# Patient Record
Sex: FEMALE | Race: WHITE | Employment: FULL TIME | URBAN - METROPOLITAN AREA
[De-identification: names, ages, dates, MRNs, and addresses within clinical notes are randomized per-mention and may not be internally consistent; named-entity substitution may affect disease eponyms.]

---

## 2017-02-13 ENCOUNTER — TELEPHONE (OUTPATIENT)
Dept: INTERNAL MEDICINE CLINIC | Facility: CLINIC | Age: 47
End: 2017-02-13

## 2017-02-13 ENCOUNTER — OFFICE VISIT (OUTPATIENT)
Dept: INTERNAL MEDICINE CLINIC | Facility: CLINIC | Age: 47
End: 2017-02-13

## 2017-02-13 VITALS
TEMPERATURE: 99 F | DIASTOLIC BLOOD PRESSURE: 76 MMHG | HEART RATE: 60 BPM | OXYGEN SATURATION: 97 % | WEIGHT: 246.81 LBS | BODY MASS INDEX: 39.66 KG/M2 | SYSTOLIC BLOOD PRESSURE: 136 MMHG | HEIGHT: 66 IN

## 2017-02-13 DIAGNOSIS — E03.9 ACQUIRED HYPOTHYROIDISM: ICD-10-CM

## 2017-02-13 DIAGNOSIS — I10 ESSENTIAL HYPERTENSION WITH GOAL BLOOD PRESSURE LESS THAN 130/80: Primary | ICD-10-CM

## 2017-02-13 DIAGNOSIS — E11.9 CONTROLLED TYPE 2 DIABETES MELLITUS WITHOUT COMPLICATION, WITHOUT LONG-TERM CURRENT USE OF INSULIN (HCC): ICD-10-CM

## 2017-02-13 LAB
CREAT UR-MCNC: 89.1 MG/DL
MICROALBUMIN UR-MCNC: 0.9 MG/DL (ref 0–1.8)
MICROALBUMIN/CREAT UR: 10.1 MG/G{CREAT} (ref 0–20)

## 2017-02-13 PROCEDURE — 99214 OFFICE O/P EST MOD 30 MIN: CPT | Performed by: INTERNAL MEDICINE

## 2017-02-13 PROCEDURE — 99212 OFFICE O/P EST SF 10 MIN: CPT | Performed by: INTERNAL MEDICINE

## 2017-02-13 PROCEDURE — 36415 COLL VENOUS BLD VENIPUNCTURE: CPT | Performed by: INTERNAL MEDICINE

## 2017-02-13 RX ORDER — AMLODIPINE BESYLATE 5 MG/1
5 TABLET ORAL
Qty: 90 TABLET | Refills: 1 | Status: SHIPPED | OUTPATIENT
Start: 2017-02-13 | End: 2017-08-07

## 2017-02-13 RX ORDER — ATENOLOL 25 MG/1
25 TABLET ORAL EVERY EVENING
Qty: 90 TABLET | Refills: 1 | Status: SHIPPED | OUTPATIENT
Start: 2017-02-13 | End: 2017-08-07

## 2017-02-13 RX ORDER — SPIRONOLACTONE 100 MG/1
100 TABLET, FILM COATED ORAL 2 TIMES DAILY
Qty: 180 TABLET | Refills: 1 | Status: SHIPPED | OUTPATIENT
Start: 2017-02-13 | End: 2017-08-07

## 2017-02-13 RX ORDER — LEVOTHYROXINE SODIUM 0.1 MG/1
TABLET ORAL
Qty: 90 TABLET | Refills: 1 | Status: SHIPPED | OUTPATIENT
Start: 2017-02-13 | End: 2017-08-07

## 2017-02-13 RX ORDER — NORETHINDRONE ACETATE AND ETHINYL ESTRADIOL, ETHINYL ESTRADIOL AND FERROUS FUMARATE 1MG-10(24)
KIT ORAL DAILY
COMMUNITY
Start: 2016-12-12 | End: 2020-02-05

## 2017-02-13 RX ORDER — ZOLPIDEM TARTRATE 5 MG/1
5 TABLET ORAL NIGHTLY PRN
Qty: 10 TABLET | Refills: 0 | Status: SHIPPED | OUTPATIENT
Start: 2017-02-13 | End: 2017-08-15

## 2017-02-13 NOTE — PATIENT INSTRUCTIONS
ASSESSMENT/PLAN:   Essential hypertension with goal blood pressure less than 130/80  (primary encounter diagnosis) Good control. Careful with diet and excercise at least 30 minutes 3-4 times a week.  Check blood pressures at different times on different day Oral Tab 90 tablet 1      Sig: Take 1 tablet (25 mg total) by mouth every evening. 1 PO QHS. Zolpidem Tartrate (AMBIEN) 5 MG Oral Tab 10 tablet 0      Sig: Take 1 tablet (5 mg total) by mouth nightly as needed for Sleep.            Imaging & Referrals:

## 2017-02-14 ENCOUNTER — PATIENT MESSAGE (OUTPATIENT)
Dept: INTERNAL MEDICINE CLINIC | Facility: CLINIC | Age: 47
End: 2017-02-14

## 2017-02-14 LAB — HBA1C MFR BLD: 6.4 % (ref 4–6)

## 2017-02-14 RX ORDER — LISINOPRIL 2.5 MG/1
2.5 TABLET ORAL EVERY EVENING
Qty: 90 TABLET | Refills: 1 | Status: SHIPPED | OUTPATIENT
Start: 2017-02-14 | End: 2017-08-07

## 2017-02-14 NOTE — TELEPHONE ENCOUNTER
Zolpidem called in to [facility] pharmacy in [place] [address]. Prescription sent to mail order pharmacy.

## 2017-02-15 NOTE — TELEPHONE ENCOUNTER
BN was faxed on the day the patient was here.   A1c is a 3 month control sugars as higher in order to protect the kidneys after the lisinopril is doing even though there is no protein now do not want to wait until there is protein in the urine and then try

## 2017-05-17 ENCOUNTER — TELEPHONE (OUTPATIENT)
Dept: OTOLARYNGOLOGY | Facility: CLINIC | Age: 47
End: 2017-05-17

## 2017-05-17 DIAGNOSIS — E04.2 MULTIPLE THYROID NODULES: Primary | ICD-10-CM

## 2017-05-17 NOTE — TELEPHONE ENCOUNTER
----- Message from Carla Fields RN sent at 2016 11:30 AM CDT -----  Regarding: Repeat US one year (from 2016)  Per  16:    Notes Recorded by Cyndi Rivera MD on 2016 at 9:00 AM  Please inform the patient that her ultrasound is very sta

## 2017-05-20 ENCOUNTER — HOSPITAL ENCOUNTER (OUTPATIENT)
Dept: ULTRASOUND IMAGING | Facility: HOSPITAL | Age: 47
Discharge: HOME | End: 2017-05-20
Attending: OTOLARYNGOLOGY
Payer: COMMERCIAL

## 2017-05-20 DIAGNOSIS — E04.2 MULTIPLE THYROID NODULES: ICD-10-CM

## 2017-05-20 PROCEDURE — 76536 US EXAM OF HEAD AND NECK: CPT | Performed by: OTOLARYNGOLOGY

## 2017-06-20 ENCOUNTER — PATIENT MESSAGE (OUTPATIENT)
Dept: INTERNAL MEDICINE CLINIC | Facility: CLINIC | Age: 47
End: 2017-06-20

## 2017-08-07 RX ORDER — LEVOTHYROXINE SODIUM 0.1 MG/1
TABLET ORAL
Qty: 90 TABLET | Refills: 1 | Status: SHIPPED
Start: 2017-08-07 | End: 2018-02-13

## 2017-08-07 RX ORDER — ATENOLOL 25 MG/1
25 TABLET ORAL EVERY EVENING
Qty: 90 TABLET | Refills: 1 | Status: SHIPPED
Start: 2017-08-07 | End: 2018-02-13

## 2017-08-07 RX ORDER — LISINOPRIL 2.5 MG/1
2.5 TABLET ORAL EVERY EVENING
Qty: 90 TABLET | Refills: 1 | Status: SHIPPED
Start: 2017-08-07 | End: 2017-10-11

## 2017-08-07 RX ORDER — AMLODIPINE BESYLATE 5 MG/1
5 TABLET ORAL
Qty: 90 TABLET | Refills: 1 | Status: SHIPPED
Start: 2017-08-07 | End: 2018-02-13

## 2017-08-07 RX ORDER — ZOLPIDEM TARTRATE 5 MG/1
5 TABLET ORAL NIGHTLY PRN
Qty: 10 TABLET | Refills: 0 | Status: CANCELLED
Start: 2017-08-07

## 2017-08-07 RX ORDER — SPIRONOLACTONE 100 MG/1
100 TABLET, FILM COATED ORAL 2 TIMES DAILY
Qty: 180 TABLET | Refills: 1 | Status: SHIPPED
Start: 2017-08-07 | End: 2018-02-13

## 2017-08-07 NOTE — TELEPHONE ENCOUNTER
[facility] Should keep appt with Dr. Arnel Rick for 8/15/17 as a 6 month follow up Dr. Arnel Rick requested. Zolpidem was not refilled.   If a small supply is needed to local pharmacy, one of the doctors in office could write robin

## 2017-08-07 NOTE — TELEPHONE ENCOUNTER
From: Fermin Gillette  Sent: 8/7/2017 8:12 AM CDT  Subject: Medication Renewal Request    Rekha Lehman would like a refill of the following medications:  Linagliptin (TRADJENTA) 5 MG Oral Tab [Leonila Maradiaga MD]  Levothyroxine Sodium 100 MCG O

## 2017-08-15 ENCOUNTER — OFFICE VISIT (OUTPATIENT)
Dept: INTERNAL MEDICINE CLINIC | Facility: CLINIC | Age: 47
End: 2017-08-15

## 2017-08-15 VITALS
DIASTOLIC BLOOD PRESSURE: 85 MMHG | TEMPERATURE: 98 F | WEIGHT: 247 LBS | HEIGHT: 66 IN | HEART RATE: 67 BPM | BODY MASS INDEX: 39.7 KG/M2 | SYSTOLIC BLOOD PRESSURE: 146 MMHG | OXYGEN SATURATION: 97 %

## 2017-08-15 DIAGNOSIS — E78.00 ELEVATED CHOLESTEROL: ICD-10-CM

## 2017-08-15 DIAGNOSIS — E03.9 ACQUIRED HYPOTHYROIDISM: ICD-10-CM

## 2017-08-15 DIAGNOSIS — E11.9 CONTROLLED TYPE 2 DIABETES MELLITUS WITHOUT COMPLICATION, WITHOUT LONG-TERM CURRENT USE OF INSULIN (HCC): ICD-10-CM

## 2017-08-15 DIAGNOSIS — R19.7 DIARRHEA, UNSPECIFIED TYPE: ICD-10-CM

## 2017-08-15 DIAGNOSIS — I10 ESSENTIAL HYPERTENSION WITH GOAL BLOOD PRESSURE LESS THAN 130/80: Primary | ICD-10-CM

## 2017-08-15 LAB — IGA SERPL-MCNC: 170 MG/DL (ref 68–378)

## 2017-08-15 PROCEDURE — 99215 OFFICE O/P EST HI 40 MIN: CPT | Performed by: INTERNAL MEDICINE

## 2017-08-15 PROCEDURE — 36415 COLL VENOUS BLD VENIPUNCTURE: CPT | Performed by: INTERNAL MEDICINE

## 2017-08-15 PROCEDURE — 99212 OFFICE O/P EST SF 10 MIN: CPT | Performed by: INTERNAL MEDICINE

## 2017-08-15 RX ORDER — OLOPATADINE HYDROCHLORIDE 1 MG/ML
1 SOLUTION/ DROPS OPHTHALMIC 2 TIMES DAILY PRN
COMMUNITY
Start: 2017-07-10

## 2017-08-15 RX ORDER — ZOLPIDEM TARTRATE 5 MG/1
5 TABLET ORAL NIGHTLY PRN
Qty: 10 TABLET | Refills: 0 | Status: SHIPPED | OUTPATIENT
Start: 2017-08-15 | End: 2018-10-11

## 2017-08-15 NOTE — PROGRESS NOTES
HPI:    Patient ID: Erica Chin is a 48year old female. Diarrhea    This is a new problem. The current episode started more than 1 month ago (NL BM is qday -2 times  adya Nl formed. ). The problem occurs 2 to 4 times per day.  The problem has bee Results  Component Value Date/Time    (H) 06/22/2016 07:41 AM    06/22/2016 07:41 AM   K 4.2 06/22/2016 07:41 AM    06/22/2016 07:41 AM   CO2 24 06/22/2016 07:41 AM   CREATSERUM 0.90 06/22/2016 07:41 AM   CA 9.2 06/22/2016 07:41 AM   AST pain, sinus pressure, sneezing, sore throat, tinnitus, trouble swallowing and voice change. Eyes: Negative for photophobia, pain, discharge, redness, itching and visual disturbance. Respiratory: Positive for cough.  Negative for apnea, choking, chest t Disp: 90 tablet Rfl: 1   LO LOESTRIN FE 1 MG-10 MCG / 10 MCG Oral Tab  Disp:  Rfl:      Allergies:No Known Allergies    HISTORY:  Past Medical History:   Diagnosis Date   • Arthritis    • Diabetes ([address]     PreDiabetes.     • Essential hypertension    • Hyp swelling or tenderness. No foreign bodies. No mastoid tenderness. Tympanic membrane is not injected, not scarred, not perforated, not erythematous, not retracted and not bulging. Tympanic membrane mobility is normal.  No middle ear effusion.  No hemotympanu and no mass. There is no tenderness. There is no rebound, no guarding and no CVA tenderness. Musculoskeletal: She exhibits no edema. Cervical back: She exhibits decreased range of motion, tenderness, bony tenderness, swelling and spasm.  She exhibi Careful with low sugars. Carry something with you and check sugar if can. Can carry aristides cracker, etc. Decrease carbohydrates. But also, careful with fruits and natural sugars. One serving a day and no more than 1 handful every day.  Check feet  every AM a

## 2017-08-16 LAB
GLIADIN IGA SER-ACNC: 0.9 U/ML (ref ?–7)
GLIADIN IGG SER-ACNC: <0.4 U/ML (ref ?–7)
TTG IGA SER-ACNC: 0.3 U/ML (ref ?–7)

## 2017-08-16 NOTE — PATIENT INSTRUCTIONS
ASSESSMENT/PLAN:   Essential hypertension with goal blood pressure less than 130/80  (primary encounter diagnosis) Good control. Careful with diet and excercise at least 30 minutes 3-4 times a week.  Check blood pressures at different times on different day

## 2017-10-10 ENCOUNTER — TELEPHONE (OUTPATIENT)
Dept: INTERNAL MEDICINE CLINIC | Facility: CLINIC | Age: 47
End: 2017-10-10

## 2017-10-10 NOTE — TELEPHONE ENCOUNTER
Labs from [facility] physical white count is normal hemoglobin hematocrit all cell counts look good urine looks clear.   Electrolytes look good sugar is elevated at 149 and A1c is elevated at 7.2 goal is less than 6.5 needs to check sugars periodicall

## 2017-10-11 RX ORDER — VALSARTAN 40 MG/1
20 TABLET ORAL NIGHTLY
Qty: 30 TABLET | Refills: 2 | Status: SHIPPED | OUTPATIENT
Start: 2017-10-11 | End: 2018-03-22

## 2017-10-12 RX ORDER — VALSARTAN 40 MG/1
20 TABLET ORAL DAILY
Qty: 15 TABLET | Refills: 0 | Status: SHIPPED | OUTPATIENT
Start: 2017-10-12 | End: 2017-11-21

## 2017-10-12 NOTE — TELEPHONE ENCOUNTER
Pt notified of recommendations below. Rx sent to [facility] in [facility]. Pt will monitor BP and report back to office. Verbalized understanding and denied further questions.

## 2017-10-12 NOTE — TELEPHONE ENCOUNTER
Put in now as allergy to that class of meds. Needs something for kidney protection from DM effects. Try diovan 40 mg  1/2 tab which would be 20 mg at night. Careful with diet and excercise at least 30 minutes 3-4 times a week.  Check blood pressures at diff

## 2017-11-21 ENCOUNTER — OFFICE VISIT (OUTPATIENT)
Dept: INTERNAL MEDICINE CLINIC | Facility: CLINIC | Age: 47
End: 2017-11-21

## 2017-11-21 VITALS
SYSTOLIC BLOOD PRESSURE: 169 MMHG | TEMPERATURE: 97 F | BODY MASS INDEX: 40.18 KG/M2 | HEART RATE: 62 BPM | OXYGEN SATURATION: 98 % | HEIGHT: 66 IN | DIASTOLIC BLOOD PRESSURE: 76 MMHG | WEIGHT: 250 LBS

## 2017-11-21 DIAGNOSIS — I10 ESSENTIAL HYPERTENSION WITH GOAL BLOOD PRESSURE LESS THAN 130/80: Primary | ICD-10-CM

## 2017-11-21 DIAGNOSIS — E78.00 ELEVATED CHOLESTEROL: ICD-10-CM

## 2017-11-21 DIAGNOSIS — E04.1 THYROID NODULE: ICD-10-CM

## 2017-11-21 DIAGNOSIS — E55.9 VITAMIN D DEFICIENCY: ICD-10-CM

## 2017-11-21 DIAGNOSIS — E11.9 CONTROLLED TYPE 2 DIABETES MELLITUS WITHOUT COMPLICATION, WITHOUT LONG-TERM CURRENT USE OF INSULIN (HCC): ICD-10-CM

## 2017-11-21 DIAGNOSIS — E03.9 ACQUIRED HYPOTHYROIDISM: ICD-10-CM

## 2017-11-21 PROCEDURE — 99214 OFFICE O/P EST MOD 30 MIN: CPT | Performed by: INTERNAL MEDICINE

## 2017-11-21 PROCEDURE — 99212 OFFICE O/P EST SF 10 MIN: CPT | Performed by: INTERNAL MEDICINE

## 2017-11-21 NOTE — PATIENT INSTRUCTIONS
ASSESSMENT/PLAN:   Essential hypertension with goal blood pressure less than 130/80  (primary encounter diagnosis) Not well control. Hold meds. Careful with diet and excercise at least 30 minutes 3-4 times a week.  Check blood pressures at different times o

## 2017-11-21 NOTE — PROGRESS NOTES
HPI:    Patient ID: Seth Butler is a 48year old female. HPI   Hypertension  Patient is here for follow up of hypertension. BP at home: occ. 130'80's. Checks. Rushing to get here. Cyst. Removed from eye L and pain.    Has been compliant with medic 06/22/2016 07:41 AM    06/22/2016 07:41 AM   K 4.2 06/22/2016 07:41 AM    06/22/2016 07:41 AM   CO2 24 06/22/2016 07:41 AM   CREATSERUM 0.90 06/22/2016 07:41 AM   CA 9.2 06/22/2016 07:41 AM   AST 34 06/22/2016 07:41 AM   ALT 46 06/22/2016 07:41 (5 mg total) by mouth once daily. Disp: 90 tablet Rfl: 1   atenolol 25 MG Oral Tab Take 1 tablet (25 mg total) by mouth every evening. 1 PO QHS. Disp: 90 tablet Rfl: 1   LO LOESTRIN FE 1 MG-10 MCG / 10 MCG Oral Tab  Disp:  Rfl:      Allergies:   Ace Inhibit Radial pulses are 2+ on the right side, and 2+ on the left side. Dorsalis pedis pulses are 2+ on the right side, and 2+ on the left side. Posterior tibial pulses are 2+ on the right side, and 2+ on the left side.    Pulmonary/Chest: Effort thyroid. Elevated cholesterol Stable 8-17. Vitamin d deficiency Take vitamin d 400 a day. Check blood in 3 months. Thyroid nodule  Check thyroid U/S.        Orders Placed This Encounter      Hemoglobin A1C [E]      Vitamin D, 25-Hydroxy [E]    M

## 2017-12-02 ENCOUNTER — HOSPITAL ENCOUNTER (OUTPATIENT)
Dept: ULTRASOUND IMAGING | Facility: HOSPITAL | Age: 47
Discharge: HOME | End: 2017-12-02
Attending: INTERNAL MEDICINE
Payer: COMMERCIAL

## 2017-12-02 DIAGNOSIS — E04.1 THYROID NODULE: ICD-10-CM

## 2017-12-02 PROCEDURE — 76536 US EXAM OF HEAD AND NECK: CPT | Performed by: INTERNAL MEDICINE

## 2018-01-02 ENCOUNTER — HOSPITAL ENCOUNTER (OUTPATIENT)
Dept: GENERAL RADIOLOGY | Facility: HOSPITAL | Age: 48
Discharge: HOME | End: 2018-01-02
Attending: INTERNAL MEDICINE
Payer: COMMERCIAL

## 2018-01-02 ENCOUNTER — TELEPHONE (OUTPATIENT)
Dept: INTERNAL MEDICINE CLINIC | Facility: CLINIC | Age: 48
End: 2018-01-02

## 2018-01-02 ENCOUNTER — OFFICE VISIT (OUTPATIENT)
Dept: INTERNAL MEDICINE CLINIC | Facility: CLINIC | Age: 48
End: 2018-01-02

## 2018-01-02 VITALS
SYSTOLIC BLOOD PRESSURE: 146 MMHG | OXYGEN SATURATION: 97 % | DIASTOLIC BLOOD PRESSURE: 87 MMHG | HEART RATE: 76 BPM | HEIGHT: 66 IN | BODY MASS INDEX: 40.18 KG/M2 | TEMPERATURE: 98 F | WEIGHT: 250 LBS

## 2018-01-02 DIAGNOSIS — I10 ESSENTIAL HYPERTENSION WITH GOAL BLOOD PRESSURE LESS THAN 130/80: ICD-10-CM

## 2018-01-02 DIAGNOSIS — E11.9 CONTROLLED TYPE 2 DIABETES MELLITUS WITHOUT COMPLICATION, WITHOUT LONG-TERM CURRENT USE OF INSULIN (HCC): ICD-10-CM

## 2018-01-02 DIAGNOSIS — R05.9 COUGH: ICD-10-CM

## 2018-01-02 DIAGNOSIS — Z12.39 BREAST CANCER SCREENING: ICD-10-CM

## 2018-01-02 DIAGNOSIS — E04.1 THYROID NODULE: ICD-10-CM

## 2018-01-02 DIAGNOSIS — E03.9 ACQUIRED HYPOTHYROIDISM: ICD-10-CM

## 2018-01-02 DIAGNOSIS — E78.00 ELEVATED CHOLESTEROL: ICD-10-CM

## 2018-01-02 DIAGNOSIS — E55.9 VITAMIN D DEFICIENCY: ICD-10-CM

## 2018-01-02 DIAGNOSIS — K76.89 LIVER CYST: Primary | ICD-10-CM

## 2018-01-02 PROBLEM — IMO0001 UNCONTROLLED TYPE 2 DIABETES MELLITUS WITHOUT COMPLICATION, WITHOUT LONG-TERM CURRENT USE OF INSULIN: Status: ACTIVE | Noted: 2018-01-02

## 2018-01-02 LAB
FLUAV + FLUBV RNA SPEC NAA+PROBE: NEGATIVE
FLUAV + FLUBV RNA SPEC NAA+PROBE: NEGATIVE
FLUAV + FLUBV RNA SPEC NAA+PROBE: POSITIVE

## 2018-01-02 PROCEDURE — 99215 OFFICE O/P EST HI 40 MIN: CPT | Performed by: INTERNAL MEDICINE

## 2018-01-02 PROCEDURE — 99212 OFFICE O/P EST SF 10 MIN: CPT | Performed by: INTERNAL MEDICINE

## 2018-01-02 PROCEDURE — 71046 X-RAY EXAM CHEST 2 VIEWS: CPT | Performed by: INTERNAL MEDICINE

## 2018-01-02 RX ORDER — OSELTAMIVIR PHOSPHATE 75 MG/1
75 CAPSULE ORAL 2 TIMES DAILY
Qty: 10 CAPSULE | Refills: 0 | Status: SHIPPED | OUTPATIENT
Start: 2018-01-02 | End: 2018-01-07

## 2018-01-02 RX ORDER — MONTELUKAST SODIUM 10 MG/1
10 TABLET ORAL NIGHTLY
Qty: 7 TABLET | Refills: 0 | Status: SHIPPED | OUTPATIENT
Start: 2018-01-02 | End: 2018-04-17

## 2018-01-02 RX ORDER — AZITHROMYCIN 250 MG/1
TABLET, FILM COATED ORAL
Qty: 6 TABLET | Refills: 0 | Status: SHIPPED | OUTPATIENT
Start: 2018-01-02 | End: 2018-02-27

## 2018-01-02 NOTE — TELEPHONE ENCOUNTER
[facility][note 3] lab states patient is positive for inflenza A

## 2018-01-02 NOTE — TELEPHONE ENCOUNTER
Patient is positive for flu A. See new Rx sent to pharmacy to begin today itself. A full 5 day course.

## 2018-01-02 NOTE — PATIENT INSTRUCTIONS
ASSESSMENT/PLAN:   Elevated cholesterol Check blood 2-18. Essential hypertension with goal blood pressure less than 130/80 ? Control. Careful with diet and excercise at least 30 minutes 3-4 times a week.  Check blood pressures at different times on diffe Referrals:  [facility] SCREENING BILAT (CPT=77067)  XR CHEST PA + LAT CHEST (CPT=71046)

## 2018-01-02 NOTE — PROGRESS NOTES
HPI:    Patient ID: Lord Hardwick is a 50year old female. URI    This is a new problem. The current episode started in the past 7 days. The problem has been unchanged. There has been no fever.  Associated symptoms include chest pain, coughing, ear stable.   Wt Readings from Last 3 Encounters:  01/02/18 : 250 lb (113.4 kg)  11/21/17 : 250 lb (113.4 kg)  08/15/17 : 247 lb (112 kg)    BP Readings from Last 3 Encounters:  01/02/18 : 146/87  11/21/17 : (!) 169/76  08/15/17 : 146/85    Labs:     Lab Result weakness, light-headedness, numbness and headaches. All other systems reviewed and are negative. Current Outpatient Prescriptions:  azithromycin (ZITHROMAX Z-LUCIANA) 250 MG Oral Tab Take two tablets by mouth today, then one tablet daily.  Disp: 6 ta valve disease   • Cancer Father      Skin cancer.     • Arthritis Mother    • Cancer Mother      lung, skin and MDS   • Hypertension Mother    • Heart Disorder Mother 61     CAD S/P MI.    • Cancer Brother      Hodgkin's   • Hypertension Brother    • Cancer Mucous membranes are not pale, not dry and not cyanotic. She does not have dentures. No oral lesions. No trismus in the jaw. No dental abscesses, uvula swelling or lacerations.  No oropharyngeal exudate, posterior oropharyngeal edema, posterior oropharyngea person, place, and time. Skin: Skin is warm and dry. She is not diaphoretic. Psychiatric: She has a normal mood and affect. Her behavior is normal.   Nursing note and vitals reviewed. ASSESSMENT/PLAN:   Elevated cholesterol Check blood 2-18. tablets by mouth today, then one tablet daily. Montelukast Sodium (SINGULAIR) 10 MG Oral Tab 7 tablet 0      Sig: Take 1 tablet (10 mg total) by mouth nightly.            Imaging & Referrals:  [facility] SCREENING BILAT (CPT=77067)  XR CHEST PA + LAT CHEST (C

## 2018-01-09 ENCOUNTER — TELEPHONE (OUTPATIENT)
Dept: OTOLARYNGOLOGY | Facility: CLINIC | Age: 48
End: 2018-01-09

## 2018-01-10 NOTE — TELEPHONE ENCOUNTER
Pt informed of results and pt name placed in [address] book.

## 2018-01-29 ENCOUNTER — TELEPHONE (OUTPATIENT)
Dept: INTERNAL MEDICINE CLINIC | Facility: CLINIC | Age: 48
End: 2018-01-29

## 2018-02-13 RX ORDER — LEVOTHYROXINE SODIUM 0.1 MG/1
TABLET ORAL
Qty: 90 TABLET | Refills: 0 | Status: SHIPPED
Start: 2018-02-13 | End: 2018-05-19

## 2018-02-13 RX ORDER — AMLODIPINE BESYLATE 5 MG/1
5 TABLET ORAL
Qty: 90 TABLET | Refills: 0 | Status: SHIPPED
Start: 2018-02-13 | End: 2018-03-09

## 2018-02-13 RX ORDER — SPIRONOLACTONE 100 MG/1
100 TABLET, FILM COATED ORAL 2 TIMES DAILY
Qty: 180 TABLET | Refills: 0 | Status: SHIPPED
Start: 2018-02-13 | End: 2018-04-17

## 2018-02-13 RX ORDER — ATENOLOL 25 MG/1
25 TABLET ORAL EVERY EVENING
Qty: 90 TABLET | Refills: 0 | Status: SHIPPED
Start: 2018-02-13 | End: 2018-05-19

## 2018-02-13 NOTE — TELEPHONE ENCOUNTER
From: Darrion Giordano  Sent: 2/13/2018 11:18 AM CST  Subject: Medication Renewal Request    Rekha Rosenberg would like a refill of the following medications:     Linagliptin (TRADJENTA) 5 MG Oral Tab [Leonila Maradiaga MD]     Levothyroxine Sodium 1

## 2018-02-15 ENCOUNTER — HOSPITAL ENCOUNTER (OUTPATIENT)
Age: 48
Discharge: HOME | End: 2018-02-15
Attending: FAMILY MEDICINE
Payer: COMMERCIAL

## 2018-02-15 VITALS
SYSTOLIC BLOOD PRESSURE: 141 MMHG | HEART RATE: 60 BPM | HEIGHT: 66 IN | RESPIRATION RATE: 16 BRPM | OXYGEN SATURATION: 98 % | BODY MASS INDEX: 40.18 KG/M2 | WEIGHT: 250 LBS | TEMPERATURE: 98 F | DIASTOLIC BLOOD PRESSURE: 75 MMHG

## 2018-02-15 DIAGNOSIS — H69.81 DYSFUNCTION OF RIGHT EUSTACHIAN TUBE: Primary | ICD-10-CM

## 2018-02-15 PROCEDURE — 99214 OFFICE O/P EST MOD 30 MIN: CPT

## 2018-02-15 PROCEDURE — 99213 OFFICE O/P EST LOW 20 MIN: CPT

## 2018-02-15 RX ORDER — PREDNISONE 20 MG/1
20 TABLET ORAL DAILY
Qty: 5 TABLET | Refills: 0 | Status: SHIPPED | OUTPATIENT
Start: 2018-02-15 | End: 2018-02-20

## 2018-02-15 NOTE — ED PROVIDER NOTES
Patient presents with:  Ear Problem Pain (neurosensory)      HPI:     Shelbie Devi is a 50year old female who presents with for chief complaint of R ear pain. X 1 week. More like pressure and sensation of fluid behind Tm.    Pt says she is recoveri reviewed. All other systems reviewed and negative except as noted above. PSFH elements reviewed from today and agreed except as otherwise stated in HPI.         Physical Exam:     Findings:    /75   Pulse 60   Temp 98.3 °F (36.8 °C) (Oral)   R

## 2018-02-15 NOTE — ED INITIAL ASSESSMENT (HPI)
Patient states having right ear pain x 2 weeks, worsening the last few nights. Diagnosed with influenza and bilateral ear infection first week of 2018, patient states she thinks ear infection never went away, put on Azithromycin.   Patient states right ear

## 2018-02-26 ENCOUNTER — APPOINTMENT (OUTPATIENT)
Dept: LAB | Facility: HOSPITAL | Age: 48
End: 2018-02-26
Attending: INTERNAL MEDICINE
Payer: COMMERCIAL

## 2018-02-26 DIAGNOSIS — E78.00 ELEVATED CHOLESTEROL: ICD-10-CM

## 2018-02-26 DIAGNOSIS — E11.9 CONTROLLED TYPE 2 DIABETES MELLITUS WITHOUT COMPLICATION, WITHOUT LONG-TERM CURRENT USE OF INSULIN (HCC): ICD-10-CM

## 2018-02-26 LAB
CHOLEST SERPL-MCNC: 181 MG/DL (ref 110–200)
HBA1C MFR BLD: 7.3 % (ref 4–6)
HDLC SERPL-MCNC: 45 MG/DL
LDLC SERPL CALC-MCNC: 100 MG/DL (ref 0–99)
NONHDLC SERPL-MCNC: 136 MG/DL
TRIGL SERPL-MCNC: 180 MG/DL (ref 1–149)

## 2018-02-26 PROCEDURE — 80061 LIPID PANEL: CPT

## 2018-02-26 PROCEDURE — 83036 HEMOGLOBIN GLYCOSYLATED A1C: CPT

## 2018-02-26 PROCEDURE — 36415 COLL VENOUS BLD VENIPUNCTURE: CPT

## 2018-02-26 PROCEDURE — 80053 COMPREHEN METABOLIC PANEL: CPT | Performed by: INTERNAL MEDICINE

## 2018-02-27 ENCOUNTER — OFFICE VISIT (OUTPATIENT)
Dept: INTERNAL MEDICINE CLINIC | Facility: CLINIC | Age: 48
End: 2018-02-27

## 2018-02-27 VITALS
HEIGHT: 66 IN | OXYGEN SATURATION: 96 % | BODY MASS INDEX: 40.66 KG/M2 | WEIGHT: 253 LBS | HEART RATE: 69 BPM | TEMPERATURE: 98 F | SYSTOLIC BLOOD PRESSURE: 147 MMHG | DIASTOLIC BLOOD PRESSURE: 85 MMHG

## 2018-02-27 DIAGNOSIS — I10 ESSENTIAL HYPERTENSION WITH GOAL BLOOD PRESSURE LESS THAN 130/80: ICD-10-CM

## 2018-02-27 DIAGNOSIS — R60.9 PERIPHERAL EDEMA: ICD-10-CM

## 2018-02-27 DIAGNOSIS — Z23 NEED FOR VACCINATION: Primary | ICD-10-CM

## 2018-02-27 DIAGNOSIS — E11.9 TYPE 2 DIABETES MELLITUS WITHOUT COMPLICATION, WITHOUT LONG-TERM CURRENT USE OF INSULIN (HCC): ICD-10-CM

## 2018-02-27 DIAGNOSIS — E04.1 THYROID NODULE: ICD-10-CM

## 2018-02-27 DIAGNOSIS — E78.00 ELEVATED CHOLESTEROL: ICD-10-CM

## 2018-02-27 DIAGNOSIS — E03.9 ACQUIRED HYPOTHYROIDISM: ICD-10-CM

## 2018-02-27 LAB
ALBUMIN SERPL BCP-MCNC: 3.6 G/DL (ref 3.5–4.8)
ALBUMIN/GLOB SERPL: 1.3 {RATIO} (ref 1–2)
ALP SERPL-CCNC: 56 U/L (ref 32–100)
ALT SERPL-CCNC: 59 U/L (ref 14–54)
ANION GAP SERPL CALC-SCNC: 11 MMOL/L (ref 0–18)
AST SERPL-CCNC: 34 U/L (ref 15–41)
BILIRUB SERPL-MCNC: 0.7 MG/DL (ref 0.3–1.2)
BUN SERPL-MCNC: 15 MG/DL (ref 8–20)
BUN/CREAT SERPL: 15.8 (ref 10–20)
CALCIUM SERPL-MCNC: 9.3 MG/DL (ref 8.5–10.5)
CHLORIDE SERPL-SCNC: 104 MMOL/L (ref 95–110)
CO2 SERPL-SCNC: 21 MMOL/L (ref 22–32)
CREAT SERPL-MCNC: 0.95 MG/DL (ref 0.5–1.5)
GLOBULIN PLAS-MCNC: 2.8 G/DL (ref 2.5–3.7)
GLUCOSE SERPL-MCNC: 172 MG/DL (ref 70–99)
OSMOLALITY UR CALC.SUM OF ELEC: 287 MOSM/KG (ref 275–295)
POTASSIUM SERPL-SCNC: 4 MMOL/L (ref 3.3–5.1)
PROT SERPL-MCNC: 6.4 G/DL (ref 5.9–8.4)
SODIUM SERPL-SCNC: 136 MMOL/L (ref 136–144)

## 2018-02-27 PROCEDURE — 90471 IMMUNIZATION ADMIN: CPT | Performed by: INTERNAL MEDICINE

## 2018-02-27 PROCEDURE — 99214 OFFICE O/P EST MOD 30 MIN: CPT | Performed by: INTERNAL MEDICINE

## 2018-02-27 PROCEDURE — 90732 PPSV23 VACC 2 YRS+ SUBQ/IM: CPT | Performed by: INTERNAL MEDICINE

## 2018-02-27 RX ORDER — PREDNISONE 20 MG/1
TABLET ORAL
Refills: 0 | COMMUNITY
Start: 2018-02-15 | End: 2018-04-17

## 2018-02-27 NOTE — PATIENT INSTRUCTIONS
ASSESSMENT/PLAN:   Need for vaccination  (primary encounter diagnosis) PCV 23. Peripheral edema Stable. Acquired hypothyroidism Awaiting blood. Elevated cholesterol Good but elevated trigs. Elevated maybe from high sugars. Lower carbs.      Type

## 2018-02-27 NOTE — PROGRESS NOTES
HPI:    Patient ID: Manda Torres is a 50year old female. HPI   R ear pain. Uc. Told fluid. Prednisone X 5 days. No more pain. Diabetes  Patient here for follow up of Diabetes. Has been taking medications regularly.     Checks sugars 1 times d 146/87    Labs:     Lab Results  Component Value Date/Time    (H) 06/22/2016 07:41 AM    06/22/2016 07:41 AM   K 4.2 06/22/2016 07:41 AM    06/22/2016 07:41 AM   CO2 24 06/22/2016 07:41 AM   CREATSERUM 0.90 06/22/2016 07:41 AM   CA 9.2 0 TAKE 1 TABLET BEFORE BREAKFAST Disp: 90 tablet Rfl: 0   spironolactone 100 MG Oral Tab Take 1 tablet (100 mg total) by mouth 2 (two) times daily. Disp: 180 tablet Rfl: 0   AmLODIPine Besylate 5 MG Oral Tab Take 1 tablet (5 mg total) by mouth once daily.  Anam Memory Paternal Uncle      Stomach.        Social History: Smoking status: Never Smoker                                                              Smokeless tobacco: Never Used                      Alcohol use: Yes           0.0 oz/week     Comment: occassionall phonation normal. Neck supple. No thyroid mass and no thyromegaly present. Cardiovascular: Normal rate, regular rhythm, S1 normal, S2 normal, normal heart sounds, intact distal pulses and normal pulses.     Pulses:       Carotid pulses are 2+ on the right elevated trigs. Elevated maybe from high sugars. Lower carbs. Type 2 diabetes mellitus without complication, without long-term current use of insulin (hcc) Not well controlled. Increase trajenta to 2 times  A day.  Careful with diet and excercise at es

## 2018-03-01 ENCOUNTER — TELEPHONE (OUTPATIENT)
Dept: INTERNAL MEDICINE CLINIC | Facility: CLINIC | Age: 48
End: 2018-03-01

## 2018-03-01 PROBLEM — R74.8 ELEVATED LIVER ENZYMES: Status: ACTIVE | Noted: 2018-03-01

## 2018-03-01 RX ORDER — GLIMEPIRIDE 2 MG/1
2 TABLET ORAL 2 TIMES DAILY
Qty: 60 TABLET | Refills: 1 | Status: SHIPPED | OUTPATIENT
Start: 2018-03-01 | End: 2018-04-17

## 2018-03-01 NOTE — TELEPHONE ENCOUNTER
Insurance not covering trajenta q12hrs. Can try amaryl 2 mg PO q12hrs. Pre meals. Please send Rx. to pharmacy.

## 2018-03-02 NOTE — TELEPHONE ENCOUNTER
Pt is currently taking her last 90 days of Tradjenta. Wants to know if she should take it every 12 hrs as you instructed her during office visit? Or should she continue taking Tradjenta once a day in addition to Glimepiride?  States she will call pharmacy t

## 2018-03-02 NOTE — TELEPHONE ENCOUNTER
Pt states that she called [facility] and was told that they would cover but Md's office would have to call and let them know there had been a change in dose. Need to call [phone].

## 2018-03-02 NOTE — TELEPHONE ENCOUNTER
Pt wants to know lab results from 2/26/18. Informed her that we already received lab test results but I believe they haven't been review. Also, she asked if you are making any changes to her hypertensive medications? Please advice.

## 2018-03-08 NOTE — TELEPHONE ENCOUNTER
1.  Spoke with pt. States that [facility] should cover the BID dosing of tradjenta, but the 800 number bleow was never contacted. We just need to clarify the increase in dose.   [address] [address], pt would like to go to BID of tradjenta if possible and not add ano

## 2018-03-08 NOTE — TELEPHONE ENCOUNTER
1. We called and got form to be filled and filled and faxed form. Yes about glimperide being D/C'ed. 2. Can do spironolactone 125 a day or increase norvasc 5 mg 2 times  A day. Careful with diet and excercise at least 30 minutes 3-4 times a week.  Check b

## 2018-03-09 RX ORDER — SPIRONOLACTONE 25 MG/1
25 TABLET ORAL DAILY
Qty: 90 TABLET | Refills: 0 | Status: CANCELLED | OUTPATIENT
Start: 2018-03-09

## 2018-03-09 RX ORDER — AMLODIPINE BESYLATE 5 MG/1
5 TABLET ORAL 2 TIMES DAILY
Qty: 180 TABLET | Refills: 0 | Status: SHIPPED | OUTPATIENT
Start: 2018-03-09 | End: 2018-07-11

## 2018-03-10 NOTE — TELEPHONE ENCOUNTER
[facility]ed [facility] left message on machine for call back with change in treatment increase in medication doses left office number to call me back on Monday 3/16/18      Just to verify  Dr. Allan Pedro is Tradjenta 5 mg ok for twice a day dosing?

## 2018-03-22 RX ORDER — VALSARTAN 40 MG/1
TABLET ORAL
Qty: 45 TABLET | Refills: 1 | Status: SHIPPED | OUTPATIENT
Start: 2018-03-22 | End: 2018-04-17

## 2018-04-16 ENCOUNTER — LAB ENCOUNTER (OUTPATIENT)
Dept: LAB | Facility: HOSPITAL | Age: 48
End: 2018-04-16
Attending: INTERNAL MEDICINE
Payer: COMMERCIAL

## 2018-04-16 DIAGNOSIS — R74.8 ELEVATED LIVER ENZYMES: ICD-10-CM

## 2018-04-16 PROCEDURE — 86708 HEPATITIS A ANTIBODY: CPT

## 2018-04-16 PROCEDURE — 87340 HEPATITIS B SURFACE AG IA: CPT

## 2018-04-16 PROCEDURE — 80053 COMPREHEN METABOLIC PANEL: CPT

## 2018-04-16 PROCEDURE — 82728 ASSAY OF FERRITIN: CPT

## 2018-04-16 PROCEDURE — 86709 HEPATITIS A IGM ANTIBODY: CPT

## 2018-04-16 PROCEDURE — 86704 HEP B CORE ANTIBODY TOTAL: CPT

## 2018-04-16 PROCEDURE — 86706 HEP B SURFACE ANTIBODY: CPT

## 2018-04-16 PROCEDURE — 86803 HEPATITIS C AB TEST: CPT

## 2018-04-16 PROCEDURE — 36415 COLL VENOUS BLD VENIPUNCTURE: CPT

## 2018-04-16 PROCEDURE — 80500 HEPATITIS A B + C PROFILE: CPT

## 2018-04-17 ENCOUNTER — OFFICE VISIT (OUTPATIENT)
Dept: INTERNAL MEDICINE CLINIC | Facility: CLINIC | Age: 48
End: 2018-04-17

## 2018-04-17 ENCOUNTER — TELEPHONE (OUTPATIENT)
Dept: INTERNAL MEDICINE CLINIC | Facility: CLINIC | Age: 48
End: 2018-04-17

## 2018-04-17 VITALS
BODY MASS INDEX: 39.28 KG/M2 | OXYGEN SATURATION: 96 % | WEIGHT: 244.38 LBS | TEMPERATURE: 98 F | HEIGHT: 66 IN | HEART RATE: 68 BPM | SYSTOLIC BLOOD PRESSURE: 136 MMHG | DIASTOLIC BLOOD PRESSURE: 84 MMHG

## 2018-04-17 DIAGNOSIS — M25.522 LEFT ELBOW PAIN: ICD-10-CM

## 2018-04-17 DIAGNOSIS — E78.00 ELEVATED CHOLESTEROL: ICD-10-CM

## 2018-04-17 DIAGNOSIS — R60.9 PERIPHERAL EDEMA: Primary | ICD-10-CM

## 2018-04-17 DIAGNOSIS — E03.9 ACQUIRED HYPOTHYROIDISM: ICD-10-CM

## 2018-04-17 DIAGNOSIS — E11.9 TYPE 2 DIABETES MELLITUS WITHOUT COMPLICATION, WITH LONG-TERM CURRENT USE OF INSULIN (HCC): ICD-10-CM

## 2018-04-17 DIAGNOSIS — I10 ESSENTIAL HYPERTENSION WITH GOAL BLOOD PRESSURE LESS THAN 130/80: ICD-10-CM

## 2018-04-17 DIAGNOSIS — Z79.4 TYPE 2 DIABETES MELLITUS WITHOUT COMPLICATION, WITH LONG-TERM CURRENT USE OF INSULIN (HCC): ICD-10-CM

## 2018-04-17 DIAGNOSIS — E55.9 VITAMIN D DEFICIENCY: ICD-10-CM

## 2018-04-17 DIAGNOSIS — E04.1 THYROID NODULE: ICD-10-CM

## 2018-04-17 PROCEDURE — 99214 OFFICE O/P EST MOD 30 MIN: CPT | Performed by: INTERNAL MEDICINE

## 2018-04-17 PROCEDURE — 99212 OFFICE O/P EST SF 10 MIN: CPT | Performed by: INTERNAL MEDICINE

## 2018-04-17 RX ORDER — VALSARTAN 40 MG/1
TABLET ORAL
Qty: 45 TABLET | Refills: 1 | Status: SHIPPED | OUTPATIENT
Start: 2018-04-17 | End: 2018-06-05

## 2018-04-17 RX ORDER — SPIRONOLACTONE 100 MG/1
TABLET, FILM COATED ORAL
Qty: 180 TABLET | Refills: 0 | Status: SHIPPED | OUTPATIENT
Start: 2018-04-17 | End: 2018-06-05

## 2018-04-17 RX ORDER — RIZATRIPTAN BENZOATE 10 MG/1
TABLET ORAL
COMMUNITY
Start: 2015-03-27 | End: 2018-04-17

## 2018-04-17 RX ORDER — MELOXICAM 7.5 MG/1
TABLET ORAL
COMMUNITY
Start: 2017-08-27 | End: 2018-04-17

## 2018-04-17 RX ORDER — CLINDAMYCIN PHOSPHATE 10 MG/G
AEROSOL, FOAM TOPICAL
COMMUNITY
Start: 2017-08-29 | End: 2018-04-17

## 2018-04-17 RX ORDER — AMLODIPINE BESYLATE 5 MG/1
TABLET ORAL
COMMUNITY
Start: 2015-03-27 | End: 2018-04-17

## 2018-04-17 NOTE — TELEPHONE ENCOUNTER
Received call from patient requesting spironolactone cancelled from mail order. Patient stated still have medication at home. Medication held until due for refill.

## 2018-04-17 NOTE — PROGRESS NOTES
HPI:    Patient ID: Evelin Deluna is a 50year old female. Elbow Pain    The pain is present in the left elbow. This is a new problem. The current episode started more than 1 month ago. There has been no history of extremity trauma.  The problem occ salt. Lower carb. Diet. Eye exam needed.    Wt Readings from Last 3 Encounters:  04/17/18 : 244 lb 6.4 oz (110.9 kg)  02/27/18 : 253 lb (114.8 kg)  02/15/18 : 250 lb (113.4 kg)    BP Readings from Last 3 Encounters:  04/17/18 : 136/84  02/27/18 : 147/85  02 Tab 1 po qam and 1/2 tab in PM. Disp: 180 tablet Rfl: 0   valsartan 40 MG Oral Tab TAKE ONE TABLET NIGHTLY Disp: 45 tablet Rfl: 1   Diclofenac Sodium 50 MG Oral Tab EC Take 1 tablet (50 mg total) by mouth 2 (two) times daily.  Disp: 14 tablet Rfl: 0   Linag Cancer Maternal Grandmother      Hepatoma. • Cancer Paternal Uncle      Stomach.        Social History: Smoking status: Never Smoker                                                              Smokeless tobacco: Never Used                      Alcohol u edema  (primary encounter diagnosis) Due to amlodipine. Hold on changes per patient. Type 2 diabetes mellitus without complication, with long-term current use of insulin (hcc). Doing better. Careful with diet and excercise at least 30 minutes 3-4 times tablet 1      Sig: TAKE ONE TABLET NIGHTLY      Diclofenac Sodium 50 MG Oral Tab EC 14 tablet 0      Sig: Take 1 tablet (50 mg total) by mouth 2 (two) times daily.            Imaging & Referrals:  None        EV#[id number]

## 2018-04-17 NOTE — PATIENT INSTRUCTIONS
ASSESSMENT/PLAN:   Peripheral edema  (primary encounter diagnosis) Due to amlodipine. Hold on changes per patient. Type 2 diabetes mellitus without complication, with long-term current use of insulin (hcc). Doing better. Careful with diet and ex PM.      valsartan 40 MG Oral Tab 45 tablet 1      Sig: TAKE ONE TABLET NIGHTLY      Diclofenac Sodium 50 MG Oral Tab EC 14 tablet 0      Sig: Take 1 tablet (50 mg total) by mouth 2 (two) times daily.            Imaging & Referrals:  None

## 2018-05-02 ENCOUNTER — APPOINTMENT (OUTPATIENT)
Dept: LAB | Facility: HOSPITAL | Age: 48
End: 2018-05-02
Attending: INTERNAL MEDICINE
Payer: COMMERCIAL

## 2018-05-02 DIAGNOSIS — R60.9 PERIPHERAL EDEMA: ICD-10-CM

## 2018-05-02 PROCEDURE — 80048 BASIC METABOLIC PNL TOTAL CA: CPT

## 2018-05-02 PROCEDURE — 36415 COLL VENOUS BLD VENIPUNCTURE: CPT

## 2018-05-20 RX ORDER — ATENOLOL 25 MG/1
TABLET ORAL
Qty: 90 TABLET | Refills: 0 | Status: SHIPPED | OUTPATIENT
Start: 2018-05-20 | End: 2018-08-05

## 2018-05-20 RX ORDER — LEVOTHYROXINE SODIUM 0.1 MG/1
TABLET ORAL
Qty: 90 TABLET | Refills: 0 | Status: SHIPPED | OUTPATIENT
Start: 2018-05-20 | End: 2018-08-15

## 2018-06-25 ENCOUNTER — OFFICE VISIT (OUTPATIENT)
Dept: INTERNAL MEDICINE CLINIC | Facility: CLINIC | Age: 48
End: 2018-06-25

## 2018-06-25 VITALS
TEMPERATURE: 98 F | WEIGHT: 244 LBS | HEIGHT: 66 IN | HEART RATE: 87 BPM | OXYGEN SATURATION: 98 % | SYSTOLIC BLOOD PRESSURE: 150 MMHG | BODY MASS INDEX: 39.21 KG/M2 | DIASTOLIC BLOOD PRESSURE: 82 MMHG

## 2018-06-25 DIAGNOSIS — I10 ESSENTIAL HYPERTENSION WITH GOAL BLOOD PRESSURE LESS THAN 130/80: ICD-10-CM

## 2018-06-25 DIAGNOSIS — E55.9 VITAMIN D DEFICIENCY: Primary | ICD-10-CM

## 2018-06-25 DIAGNOSIS — E03.9 ACQUIRED HYPOTHYROIDISM: ICD-10-CM

## 2018-06-25 DIAGNOSIS — E11.9 TYPE 2 DIABETES MELLITUS WITHOUT COMPLICATION, WITHOUT LONG-TERM CURRENT USE OF INSULIN (HCC): ICD-10-CM

## 2018-06-25 DIAGNOSIS — E78.00 ELEVATED CHOLESTEROL: ICD-10-CM

## 2018-06-25 PROCEDURE — 99212 OFFICE O/P EST SF 10 MIN: CPT | Performed by: INTERNAL MEDICINE

## 2018-06-25 PROCEDURE — 99214 OFFICE O/P EST MOD 30 MIN: CPT | Performed by: INTERNAL MEDICINE

## 2018-06-25 RX ORDER — SPIRONOLACTONE 100 MG/1
TABLET, FILM COATED ORAL
Qty: 32 TABLET | Refills: 0 | Status: SHIPPED | OUTPATIENT
Start: 2018-06-25 | End: 2018-10-12

## 2018-06-25 RX ORDER — VALSARTAN 40 MG/1
TABLET ORAL
Qty: 45 TABLET | Refills: 1 | Status: SHIPPED | OUTPATIENT
Start: 2018-06-25 | End: 2018-06-25

## 2018-06-25 RX ORDER — VALSARTAN 40 MG/1
TABLET ORAL
Qty: 90 TABLET | Refills: 1 | Status: SHIPPED | OUTPATIENT
Start: 2018-06-25 | End: 2018-06-25

## 2018-06-25 RX ORDER — VALSARTAN 40 MG/1
TABLET ORAL
Qty: 90 TABLET | Refills: 1 | Status: SHIPPED | OUTPATIENT
Start: 2018-06-25 | End: 2018-08-22 | Stop reason: ALTCHOICE

## 2018-06-25 NOTE — PATIENT INSTRUCTIONS
ASSESSMENT/PLAN:   Vitamin d deficiency  (primary encounter diagnosis) Check blood. Essential hypertension with goal blood pressure less than 130/80 ? Control but off meds. X 2 weeks. Restart meds.  Careful with diet and excercise at least 30 minutes 3-

## 2018-06-25 NOTE — PROGRESS NOTES
HPI:    Patient ID: Lord Hardwick is a 50year old female. HPI   Tendonitis. S/P PT. Cortisone. Was on mobic. Not helping. Diabetes  Patient here for follow up of Diabetes. Has been taking medications regularly. Checks sugars 1 times daily. 05/02/2018 07:23 AM    05/02/2018 07:23 AM   CO2 23 05/02/2018 07:23 AM   CREATSERUM 0.89 05/02/2018 07:23 AM   CA 8.8 05/02/2018 07:23 AM   AST 26 04/16/2018 07:10 AM   ALT 46 04/16/2018 07:10 AM          Lab Results  Component Value Date/Time   CHO tablet Rfl: 0   LO LOESTRIN FE 1 MG-10 MCG / 10 MCG Oral Tab  Disp:  Rfl:      Allergies:   Ace Inhibitors          Coughing    Comment:Lisinopril  Metformin               DIARRHEA    HISTORY:  Past Medical History:   Diagnosis Date   • Arthritis    • Diabe side.       Radial pulses are 2+ on the right side, and 2+ on the left side. Dorsalis pedis pulses are 2+ on the right side, and 2+ on the left side. Posterior tibial pulses are 2+ on the right side, and 2+ on the left side.    Pulmonary/Chest fasting blood thru work. Acquired hypothyroidism Check blood. Stop mobic.      Orders Placed This Encounter      Lipid Panel [E]      Comp Metabolic Panel (14) [E]      Hemoglobin A1C [E]      Microalb/Creat Ratio, Random Urine [E]      TSH W Reflex

## 2018-07-05 ENCOUNTER — MED REC SCAN ONLY (OUTPATIENT)
Dept: INTERNAL MEDICINE CLINIC | Facility: CLINIC | Age: 48
End: 2018-07-05

## 2018-07-11 RX ORDER — AMLODIPINE BESYLATE 5 MG/1
TABLET ORAL
Qty: 180 TABLET | Refills: 0 | Status: ON HOLD | OUTPATIENT
Start: 2018-07-11 | End: 2018-10-03

## 2018-08-06 RX ORDER — ATENOLOL 25 MG/1
TABLET ORAL
Qty: 90 TABLET | Refills: 0 | Status: ON HOLD | OUTPATIENT
Start: 2018-08-06 | End: 2018-10-03

## 2018-08-15 RX ORDER — LEVOTHYROXINE SODIUM 0.1 MG/1
TABLET ORAL
Qty: 90 TABLET | Refills: 0 | Status: SHIPPED | OUTPATIENT
Start: 2018-08-15 | End: 2018-11-13

## 2018-08-31 ENCOUNTER — OFFICE VISIT (OUTPATIENT)
Dept: INTERNAL MEDICINE CLINIC | Facility: CLINIC | Age: 48
End: 2018-08-31
Payer: COMMERCIAL

## 2018-08-31 ENCOUNTER — HOSPITAL ENCOUNTER (OUTPATIENT)
Dept: CT IMAGING | Facility: HOSPITAL | Age: 48
Discharge: HOME | End: 2018-08-31
Attending: INTERNAL MEDICINE
Payer: COMMERCIAL

## 2018-08-31 ENCOUNTER — LAB ENCOUNTER (OUTPATIENT)
Dept: LAB | Age: 48
End: 2018-08-31
Attending: INTERNAL MEDICINE
Payer: COMMERCIAL

## 2018-08-31 VITALS
TEMPERATURE: 99 F | DIASTOLIC BLOOD PRESSURE: 81 MMHG | BODY MASS INDEX: 39.37 KG/M2 | WEIGHT: 245 LBS | HEIGHT: 66 IN | SYSTOLIC BLOOD PRESSURE: 137 MMHG | HEART RATE: 60 BPM

## 2018-08-31 DIAGNOSIS — R10.32 LLQ PAIN: ICD-10-CM

## 2018-08-31 DIAGNOSIS — R10.32 LLQ PAIN: Primary | ICD-10-CM

## 2018-08-31 LAB
ALBUMIN SERPL BCP-MCNC: 3.8 G/DL (ref 3.5–4.8)
ALBUMIN/GLOB SERPL: 1.2 {RATIO} (ref 1–2)
ALP SERPL-CCNC: 65 U/L (ref 32–100)
ALT SERPL-CCNC: 38 U/L (ref 14–54)
AMYLASE SERPL-CCNC: 51 U/L (ref 24–108)
ANION GAP SERPL CALC-SCNC: 7 MMOL/L (ref 0–18)
AST SERPL-CCNC: 29 U/L (ref 15–41)
BASOPHILS # BLD: 0.1 K/UL (ref 0–0.2)
BASOPHILS NFR BLD: 1 %
BILIRUB SERPL-MCNC: 0.6 MG/DL (ref 0.3–1.2)
BUN SERPL-MCNC: 8 MG/DL (ref 8–20)
BUN/CREAT SERPL: 9.2 (ref 10–20)
CALCIUM SERPL-MCNC: 9.2 MG/DL (ref 8.5–10.5)
CHLORIDE SERPL-SCNC: 102 MMOL/L (ref 95–110)
CO2 SERPL-SCNC: 26 MMOL/L (ref 22–32)
CREAT BLD-MCNC: 0.8 MG/DL (ref 0.5–1.5)
CREAT SERPL-MCNC: 0.87 MG/DL (ref 0.5–1.5)
EOSINOPHIL # BLD: 0.1 K/UL (ref 0–0.7)
EOSINOPHIL NFR BLD: 1 %
ERYTHROCYTE [DISTWIDTH] IN BLOOD BY AUTOMATED COUNT: 12.9 % (ref 11–15)
GLOBULIN PLAS-MCNC: 3.1 G/DL (ref 2.5–3.7)
GLUCOSE SERPL-MCNC: 181 MG/DL (ref 70–99)
HCT VFR BLD AUTO: 46.8 % (ref 35–48)
HGB BLD-MCNC: 15.5 G/DL (ref 12–16)
LIPASE SERPL-CCNC: 36 U/L (ref 22–51)
LYMPHOCYTES # BLD: 2.3 K/UL (ref 1–4)
LYMPHOCYTES NFR BLD: 24 %
MCH RBC QN AUTO: 29.7 PG (ref 27–32)
MCHC RBC AUTO-ENTMCNC: 33 G/DL (ref 32–37)
MCV RBC AUTO: 89.8 FL (ref 80–100)
MONOCYTES # BLD: 1 K/UL (ref 0–1)
MONOCYTES NFR BLD: 11 %
NEUTROPHILS # BLD AUTO: 6.1 K/UL (ref 1.8–7.7)
NEUTROPHILS NFR BLD: 64 %
OSMOLALITY UR CALC.SUM OF ELEC: 283 MOSM/KG (ref 275–295)
PATIENT FASTING: NO
PLATELET # BLD AUTO: 234 K/UL (ref 140–400)
PMV BLD AUTO: 10.4 FL (ref 7.4–10.3)
POTASSIUM SERPL-SCNC: 4 MMOL/L (ref 3.3–5.1)
PROT SERPL-MCNC: 6.9 G/DL (ref 5.9–8.4)
RBC # BLD AUTO: 5.21 M/UL (ref 3.7–5.4)
SODIUM SERPL-SCNC: 135 MMOL/L (ref 136–144)
WBC # BLD AUTO: 9.5 K/UL (ref 4–11)

## 2018-08-31 PROCEDURE — 85025 COMPLETE CBC W/AUTO DIFF WBC: CPT

## 2018-08-31 PROCEDURE — 82150 ASSAY OF AMYLASE: CPT

## 2018-08-31 PROCEDURE — 82565 ASSAY OF CREATININE: CPT

## 2018-08-31 PROCEDURE — 83690 ASSAY OF LIPASE: CPT

## 2018-08-31 PROCEDURE — 80053 COMPREHEN METABOLIC PANEL: CPT

## 2018-08-31 PROCEDURE — 36415 COLL VENOUS BLD VENIPUNCTURE: CPT

## 2018-08-31 PROCEDURE — 99213 OFFICE O/P EST LOW 20 MIN: CPT | Performed by: INTERNAL MEDICINE

## 2018-08-31 PROCEDURE — 74177 CT ABD & PELVIS W/CONTRAST: CPT | Performed by: INTERNAL MEDICINE

## 2018-08-31 PROCEDURE — 99212 OFFICE O/P EST SF 10 MIN: CPT | Performed by: INTERNAL MEDICINE

## 2018-08-31 NOTE — PATIENT INSTRUCTIONS
Diverticulosis    Diverticulosis means that small pouches have formed in the wall of your large intestine (colon). Most often, this problem causes no symptoms and is common as people age. But the pouches in the colon are at risk of becoming infected.  Whe · Begin an exercise program. Ask your provider how to get started. Generally, walking is the best.  · Get plenty of rest and sleep. Follow-up care  Follow up with your healthcare provider, or as advised.  Regular visits may be needed to check on your healt The colon (large intestine) is the last part of the digestive tract. It absorbs water from stool and changes it from a liquid to a solid. In certain cases, small pouches called diverticula can form in the colon wall.  This condition is called diverticulosis Help keep your colon healthy with a diet that includes plenty of high-fiber fruits, vegetables, and whole grains. Drink plenty of liquids like water and juice.  Maintain a healthy lifestyle including regular exercise, stress management, and adequate rest an

## 2018-08-31 NOTE — PROGRESS NOTES
Louann Salmon is a 50year old female.   Patient presents with:  Abdominal Pain: with movement       HPI:   Pt comes as an urgent visit   C/c LLQ pain x 2 days   C/o LLQ x 2days   aways there but worse if she stands up or moves   Similar to when she ha Laterality Date   • Appendectomy  2008   • Appendectomy     • Arthroscopy of joint unlisted Left 1-4-16    Labral tear reapir, iliopsoas burscetomy.     • Carpal tunnel release Right 2014   • Cholecystectomy     • Hip replacement surgery Left 2016   • Lee Yañez WITH DIFFERENTIAL WITH PLATELET; Future  -     COMP METABOLIC PANEL (14); Future  -     AMYLASE; Future  -     LIPASE;  Future    will check cta nd labs including wt ct   She is aware that if she develops any fevers worsening pain etc. to go to the emergenc

## 2018-09-04 ENCOUNTER — TELEPHONE (OUTPATIENT)
Dept: INTERNAL MEDICINE CLINIC | Facility: CLINIC | Age: 48
End: 2018-09-04

## 2018-09-04 NOTE — TELEPHONE ENCOUNTER
Patient called requesting to speak to you in regards to the CT scan results Dr. Joselyn Szymanski ordered.   She was told to follow up with you as soon as possible, due to the results

## 2018-09-10 ENCOUNTER — OFFICE VISIT (OUTPATIENT)
Dept: NEPHROLOGY | Facility: CLINIC | Age: 48
End: 2018-09-10
Payer: COMMERCIAL

## 2018-09-10 VITALS
BODY MASS INDEX: 39.37 KG/M2 | SYSTOLIC BLOOD PRESSURE: 154 MMHG | HEART RATE: 68 BPM | WEIGHT: 245 LBS | DIASTOLIC BLOOD PRESSURE: 80 MMHG | HEIGHT: 66 IN

## 2018-09-10 DIAGNOSIS — C64.9 RENAL CELL CARCINOMA, UNSPECIFIED LATERALITY (HCC): Primary | ICD-10-CM

## 2018-09-10 PROCEDURE — 99244 OFF/OP CNSLTJ NEW/EST MOD 40: CPT | Performed by: INTERNAL MEDICINE

## 2018-09-10 PROCEDURE — 99212 OFFICE O/P EST SF 10 MIN: CPT | Performed by: INTERNAL MEDICINE

## 2018-09-10 NOTE — H&P
[facility]    PATIENT'S NAME: Ricki De Oliveira   ATTENDING PHYSICIAN: Lesa Fulton MD   PATIENT ACCOUNT#:   [de-identified]    LOCATION:    MEDICAL RECORD #:   [id number]       YOB: 1970  ADMISSION DATE:       10/01/2018    HISTO appendectomy; in 2016, laparoscopic cholecystectomy; status post hip surgery on the left with repair in 2016.     MEDICATIONS:  A skin cream, candesartan cilexetil, levothyroxine, atenolol, clindamycin, triamcinolone acetonide, amlodipine, spironolactone, T normal.  Uterus normal.  Adnexa normal.     LABORATORY DATA:  Urinalysis was negative. ASSESSMENT AND PLAN:  A 4.3 cm lower pole right kidney mass. I reviewed the films.   It may abut the collecting system in the lower hilum, may effect ureteral blood s

## 2018-09-13 ENCOUNTER — OFFICE VISIT (OUTPATIENT)
Dept: HEMATOLOGY/ONCOLOGY | Facility: HOSPITAL | Age: 48
End: 2018-09-13
Attending: INTERNAL MEDICINE
Payer: COMMERCIAL

## 2018-09-13 VITALS
HEART RATE: 59 BPM | HEIGHT: 66 IN | SYSTOLIC BLOOD PRESSURE: 111 MMHG | RESPIRATION RATE: 18 BRPM | DIASTOLIC BLOOD PRESSURE: 57 MMHG | TEMPERATURE: 98 F | BODY MASS INDEX: 39.53 KG/M2 | WEIGHT: 246 LBS

## 2018-09-13 DIAGNOSIS — N28.89 RIGHT RENAL MASS: Primary | ICD-10-CM

## 2018-09-13 PROCEDURE — 99245 OFF/OP CONSLTJ NEW/EST HI 55: CPT | Performed by: INTERNAL MEDICINE

## 2018-09-13 NOTE — CONSULTS
[facility]    PATIENT'S NAME: Cheko Grajeda   CONSULTING PHYSICIAN: Mendel Lima.  Reynaldo Wilhelm MD   PATIENT ACCOUNT #: [de-identified] LOCATION: [facility] [place] [address] RECORD #: [id number] YOB: 1970   CONSULTATION DATE: 09/13/2018       CANCER pulmonary non-Hodgkin lymphoma, brother with Hodgkin lymphoma. REVIEW OF SYSTEMS:  All other systems are reviewed and negative x12. PHYSICAL EXAMINATION:    GENERAL:  No acute distress, alert and oriented.   VITAL SIGNS:  ECOG performance status is 17:23:37  [facility] [id number]  [facility]/    cc: MD Adama Herrera.  Kervin Obando MD

## 2018-09-20 ENCOUNTER — TELEPHONE (OUTPATIENT)
Dept: HEMATOLOGY/ONCOLOGY | Facility: HOSPITAL | Age: 48
End: 2018-09-20

## 2018-09-20 NOTE — PROGRESS NOTES
Dear Arnol Schafer  Thank you for the referral of Derek Lezama. Cleveland Morales is here to see me as she is going to be having surgery with you in early October for a right partial nephrectomy.   She had some abdominal pain denies any blood in her urine any chest pain any questions  Jossy Benjamin

## 2018-09-21 ENCOUNTER — TELEPHONE (OUTPATIENT)
Dept: HEMATOLOGY/ONCOLOGY | Facility: HOSPITAL | Age: 48
End: 2018-09-21

## 2018-09-21 NOTE — TELEPHONE ENCOUNTER
Rekha calling asking about her prior auth on CT scan.  She has to have this done prior to her surgery 10/1. emilia

## 2018-09-25 ENCOUNTER — APPOINTMENT (OUTPATIENT)
Dept: LAB | Facility: HOSPITAL | Age: 48
End: 2018-09-25
Attending: INTERNAL MEDICINE
Payer: COMMERCIAL

## 2018-09-25 ENCOUNTER — HOSPITAL ENCOUNTER (OUTPATIENT)
Dept: CT IMAGING | Facility: HOSPITAL | Age: 48
Discharge: HOME | End: 2018-09-25
Attending: INTERNAL MEDICINE
Payer: COMMERCIAL

## 2018-09-25 DIAGNOSIS — N28.89 RIGHT RENAL MASS: ICD-10-CM

## 2018-09-25 DIAGNOSIS — Z01.818 PRE-OP TESTING: ICD-10-CM

## 2018-09-25 LAB
ANION GAP SERPL CALC-SCNC: 9 MMOL/L (ref 0–18)
ANTIBODY SCREEN: NEGATIVE
BUN SERPL-MCNC: 13 MG/DL (ref 8–20)
BUN/CREAT SERPL: 15.9 (ref 10–20)
CALCIUM SERPL-MCNC: 9.1 MG/DL (ref 8.5–10.5)
CHLORIDE SERPL-SCNC: 102 MMOL/L (ref 95–110)
CO2 SERPL-SCNC: 23 MMOL/L (ref 22–32)
CREAT SERPL-MCNC: 0.82 MG/DL (ref 0.5–1.5)
GLUCOSE SERPL-MCNC: 113 MG/DL (ref 70–99)
OSMOLALITY UR CALC.SUM OF ELEC: 279 MOSM/KG (ref 275–295)
POTASSIUM SERPL-SCNC: 3.7 MMOL/L (ref 3.3–5.1)
RH BLOOD TYPE: POSITIVE
SODIUM SERPL-SCNC: 134 MMOL/L (ref 136–144)

## 2018-09-25 PROCEDURE — 71250 CT THORAX DX C-: CPT | Performed by: INTERNAL MEDICINE

## 2018-09-25 PROCEDURE — 80048 BASIC METABOLIC PNL TOTAL CA: CPT

## 2018-09-25 PROCEDURE — 86900 BLOOD TYPING SEROLOGIC ABO: CPT

## 2018-09-25 PROCEDURE — 86850 RBC ANTIBODY SCREEN: CPT

## 2018-09-25 PROCEDURE — 93010 ELECTROCARDIOGRAM REPORT: CPT | Performed by: UROLOGY

## 2018-09-25 PROCEDURE — 36415 COLL VENOUS BLD VENIPUNCTURE: CPT

## 2018-09-25 PROCEDURE — 93005 ELECTROCARDIOGRAM TRACING: CPT

## 2018-09-25 PROCEDURE — 86901 BLOOD TYPING SEROLOGIC RH(D): CPT

## 2018-10-01 ENCOUNTER — HOSPITAL ENCOUNTER (INPATIENT)
Facility: HOSPITAL | Age: 48
LOS: 2 days | Discharge: HOME | DRG: 657 | End: 2018-10-03
Attending: UROLOGY | Admitting: INTERNAL MEDICINE
Payer: COMMERCIAL

## 2018-10-01 ENCOUNTER — ANESTHESIA EVENT (OUTPATIENT)
Dept: SURGERY | Facility: HOSPITAL | Age: 48
DRG: 657 | End: 2018-10-01
Payer: COMMERCIAL

## 2018-10-01 ENCOUNTER — ANESTHESIA (OUTPATIENT)
Dept: SURGERY | Facility: HOSPITAL | Age: 48
DRG: 657 | End: 2018-10-01
Payer: COMMERCIAL

## 2018-10-01 DIAGNOSIS — IMO0001 UNCONTROLLED TYPE 2 DIABETES MELLITUS WITHOUT COMPLICATION, WITHOUT LONG-TERM CURRENT USE OF INSULIN: ICD-10-CM

## 2018-10-01 DIAGNOSIS — Z01.818 PRE-OP TESTING: Primary | ICD-10-CM

## 2018-10-01 PROBLEM — N28.89 RENAL MASS, RIGHT: Status: ACTIVE | Noted: 2018-10-01

## 2018-10-01 PROCEDURE — 99223 1ST HOSP IP/OBS HIGH 75: CPT | Performed by: INTERNAL MEDICINE

## 2018-10-01 PROCEDURE — 0TB04ZZ EXCISION OF RIGHT KIDNEY, PERCUTANEOUS ENDOSCOPIC APPROACH: ICD-10-PCS | Performed by: UROLOGY

## 2018-10-01 RX ORDER — GLYCOPYRROLATE 0.2 MG/ML
INJECTION INTRAMUSCULAR; INTRAVENOUS AS NEEDED
Status: DISCONTINUED | OUTPATIENT
Start: 2018-10-01 | End: 2018-10-01 | Stop reason: SURG

## 2018-10-01 RX ORDER — MORPHINE SULFATE 4 MG/ML
2 INJECTION, SOLUTION INTRAMUSCULAR; INTRAVENOUS EVERY 10 MIN PRN
Status: DISCONTINUED | OUTPATIENT
Start: 2018-10-01 | End: 2018-10-01 | Stop reason: HOSPADM

## 2018-10-01 RX ORDER — BUPIVACAINE HYDROCHLORIDE 5 MG/ML
INJECTION, SOLUTION EPIDURAL; INTRACAUDAL AS NEEDED
Status: DISCONTINUED | OUTPATIENT
Start: 2018-10-01 | End: 2018-10-01 | Stop reason: HOSPADM

## 2018-10-01 RX ORDER — SPIRONOLACTONE 25 MG/1
25 TABLET ORAL ONCE
Status: DISCONTINUED | OUTPATIENT
Start: 2018-10-01 | End: 2018-10-01

## 2018-10-01 RX ORDER — ONDANSETRON 4 MG/1
4 TABLET, FILM COATED ORAL EVERY 6 HOURS PRN
Status: DISCONTINUED | OUTPATIENT
Start: 2018-10-01 | End: 2018-10-03

## 2018-10-01 RX ORDER — DIPHENHYDRAMINE HCL 25 MG
25 CAPSULE ORAL NIGHTLY PRN
Status: DISCONTINUED | OUTPATIENT
Start: 2018-10-01 | End: 2018-10-03

## 2018-10-01 RX ORDER — HYDROCODONE BITARTRATE AND ACETAMINOPHEN 5; 325 MG/1; MG/1
2 TABLET ORAL EVERY 4 HOURS PRN
Status: DISCONTINUED | OUTPATIENT
Start: 2018-10-01 | End: 2018-10-02

## 2018-10-01 RX ORDER — DEXTROSE, SODIUM CHLORIDE, AND POTASSIUM CHLORIDE 5; .45; .15 G/100ML; G/100ML; G/100ML
INJECTION INTRAVENOUS CONTINUOUS
Status: DISCONTINUED | OUTPATIENT
Start: 2018-10-01 | End: 2018-10-01

## 2018-10-01 RX ORDER — SODIUM CHLORIDE 9 MG/ML
INJECTION, SOLUTION INTRAVENOUS CONTINUOUS
Status: DISCONTINUED | OUTPATIENT
Start: 2018-10-01 | End: 2018-10-03

## 2018-10-01 RX ORDER — LOSARTAN POTASSIUM 50 MG/1
50 TABLET ORAL NIGHTLY
Status: DISCONTINUED | OUTPATIENT
Start: 2018-10-01 | End: 2018-10-03

## 2018-10-01 RX ORDER — HYDROCODONE BITARTRATE AND ACETAMINOPHEN 5; 325 MG/1; MG/1
1 TABLET ORAL AS NEEDED
Status: DISCONTINUED | OUTPATIENT
Start: 2018-10-01 | End: 2018-10-01 | Stop reason: HOSPADM

## 2018-10-01 RX ORDER — SODIUM CHLORIDE 0.9 % (FLUSH) 0.9 %
10 SYRINGE (ML) INJECTION AS NEEDED
Status: DISCONTINUED | OUTPATIENT
Start: 2018-10-01 | End: 2018-10-03

## 2018-10-01 RX ORDER — SODIUM PHOSPHATE, DIBASIC AND SODIUM PHOSPHATE, MONOBASIC 7; 19 G/133ML; G/133ML
1 ENEMA RECTAL ONCE AS NEEDED
Status: DISCONTINUED | OUTPATIENT
Start: 2018-10-01 | End: 2018-10-01

## 2018-10-01 RX ORDER — AMLODIPINE BESYLATE 2.5 MG/1
2.5 TABLET ORAL DAILY
Status: DISCONTINUED | OUTPATIENT
Start: 2018-10-02 | End: 2018-10-03

## 2018-10-01 RX ORDER — ACETAMINOPHEN 325 MG/1
650 TABLET ORAL EVERY 4 HOURS PRN
Status: DISCONTINUED | OUTPATIENT
Start: 2018-10-01 | End: 2018-10-03

## 2018-10-01 RX ORDER — ACETAMINOPHEN 500 MG
1000 TABLET ORAL ONCE
Status: COMPLETED | OUTPATIENT
Start: 2018-10-01 | End: 2018-10-01

## 2018-10-01 RX ORDER — KETOTIFEN FUMARATE 0.35 MG/ML
1 SOLUTION/ DROPS OPHTHALMIC 2 TIMES DAILY
Status: DISCONTINUED | OUTPATIENT
Start: 2018-10-01 | End: 2018-10-01

## 2018-10-01 RX ORDER — MIDAZOLAM HYDROCHLORIDE 1 MG/ML
INJECTION INTRAMUSCULAR; INTRAVENOUS AS NEEDED
Status: DISCONTINUED | OUTPATIENT
Start: 2018-10-01 | End: 2018-10-01 | Stop reason: SURG

## 2018-10-01 RX ORDER — MORPHINE SULFATE 4 MG/ML
4 INJECTION, SOLUTION INTRAMUSCULAR; INTRAVENOUS EVERY 10 MIN PRN
Status: DISCONTINUED | OUTPATIENT
Start: 2018-10-01 | End: 2018-10-01 | Stop reason: HOSPADM

## 2018-10-01 RX ORDER — DEXAMETHASONE SODIUM PHOSPHATE 4 MG/ML
VIAL (ML) INJECTION AS NEEDED
Status: DISCONTINUED | OUTPATIENT
Start: 2018-10-01 | End: 2018-10-01 | Stop reason: SURG

## 2018-10-01 RX ORDER — MORPHINE SULFATE 4 MG/ML
6 INJECTION, SOLUTION INTRAMUSCULAR; INTRAVENOUS EVERY 2 HOUR PRN
Status: DISCONTINUED | OUTPATIENT
Start: 2018-10-01 | End: 2018-10-03

## 2018-10-01 RX ORDER — SODIUM PHOSPHATE, DIBASIC AND SODIUM PHOSPHATE, MONOBASIC 7; 19 G/133ML; G/133ML
1 ENEMA RECTAL ONCE AS NEEDED
Status: DISCONTINUED | OUTPATIENT
Start: 2018-10-01 | End: 2018-10-03

## 2018-10-01 RX ORDER — LIDOCAINE HYDROCHLORIDE 10 MG/ML
INJECTION, SOLUTION EPIDURAL; INFILTRATION; INTRACAUDAL; PERINEURAL AS NEEDED
Status: DISCONTINUED | OUTPATIENT
Start: 2018-10-01 | End: 2018-10-01 | Stop reason: HOSPADM

## 2018-10-01 RX ORDER — HALOPERIDOL 5 MG/ML
0.25 INJECTION INTRAMUSCULAR ONCE AS NEEDED
Status: DISCONTINUED | OUTPATIENT
Start: 2018-10-01 | End: 2018-10-01 | Stop reason: HOSPADM

## 2018-10-01 RX ORDER — CEFAZOLIN SODIUM/WATER 2 G/20 ML
2 SYRINGE (ML) INTRAVENOUS EVERY 8 HOURS
Status: COMPLETED | OUTPATIENT
Start: 2018-10-01 | End: 2018-10-02

## 2018-10-01 RX ORDER — DIPHENHYDRAMINE HYDROCHLORIDE 50 MG/ML
12.5 INJECTION INTRAMUSCULAR; INTRAVENOUS NIGHTLY PRN
Status: DISCONTINUED | OUTPATIENT
Start: 2018-10-01 | End: 2018-10-03

## 2018-10-01 RX ORDER — SCOLOPAMINE TRANSDERMAL SYSTEM 1 MG/1
1 PATCH, EXTENDED RELEASE TRANSDERMAL ONCE
Status: DISCONTINUED | OUTPATIENT
Start: 2018-10-01 | End: 2018-10-01

## 2018-10-01 RX ORDER — POLYVINYL ALCOHOL 14 MG/ML
1 SOLUTION/ DROPS OPHTHALMIC 4 TIMES DAILY PRN
Status: DISCONTINUED | OUTPATIENT
Start: 2018-10-01 | End: 2018-10-03

## 2018-10-01 RX ORDER — CANDESARTAN 8 MG/1
8 TABLET ORAL NIGHTLY
Status: DISCONTINUED | OUTPATIENT
Start: 2018-10-01 | End: 2018-10-01 | Stop reason: RX

## 2018-10-01 RX ORDER — BISACODYL 10 MG
10 SUPPOSITORY, RECTAL RECTAL
Status: DISCONTINUED | OUTPATIENT
Start: 2018-10-01 | End: 2018-10-01

## 2018-10-01 RX ORDER — POLYETHYLENE GLYCOL 3350 17 G/17G
17 POWDER, FOR SOLUTION ORAL DAILY PRN
Status: DISCONTINUED | OUTPATIENT
Start: 2018-10-01 | End: 2018-10-01

## 2018-10-01 RX ORDER — MORPHINE SULFATE 4 MG/ML
4 INJECTION, SOLUTION INTRAMUSCULAR; INTRAVENOUS EVERY 2 HOUR PRN
Status: DISCONTINUED | OUTPATIENT
Start: 2018-10-01 | End: 2018-10-03

## 2018-10-01 RX ORDER — METOCLOPRAMIDE HYDROCHLORIDE 5 MG/ML
10 INJECTION INTRAMUSCULAR; INTRAVENOUS EVERY 8 HOURS PRN
Status: DISCONTINUED | OUTPATIENT
Start: 2018-10-01 | End: 2018-10-03

## 2018-10-01 RX ORDER — DEXTROSE MONOHYDRATE 25 G/50ML
50 INJECTION, SOLUTION INTRAVENOUS AS NEEDED
Status: DISCONTINUED | OUTPATIENT
Start: 2018-10-01 | End: 2018-10-03

## 2018-10-01 RX ORDER — LEVOTHYROXINE SODIUM 0.1 MG/1
100 TABLET ORAL
Status: DISCONTINUED | OUTPATIENT
Start: 2018-10-02 | End: 2018-10-03

## 2018-10-01 RX ORDER — METOPROLOL TARTRATE 5 MG/5ML
2.5 INJECTION INTRAVENOUS ONCE
Status: DISCONTINUED | OUTPATIENT
Start: 2018-10-01 | End: 2018-10-01 | Stop reason: HOSPADM

## 2018-10-01 RX ORDER — LIDOCAINE HYDROCHLORIDE 10 MG/ML
INJECTION, SOLUTION EPIDURAL; INFILTRATION; INTRACAUDAL; PERINEURAL AS NEEDED
Status: DISCONTINUED | OUTPATIENT
Start: 2018-10-01 | End: 2018-10-01 | Stop reason: SURG

## 2018-10-01 RX ORDER — OLOPATADINE HYDROCHLORIDE 1 MG/ML
1 SOLUTION/ DROPS OPHTHALMIC 2 TIMES DAILY PRN
Status: DISCONTINUED | OUTPATIENT
Start: 2018-10-01 | End: 2018-10-01 | Stop reason: RX

## 2018-10-01 RX ORDER — HYDROCODONE BITARTRATE AND ACETAMINOPHEN 5; 325 MG/1; MG/1
1 TABLET ORAL EVERY 4 HOURS PRN
Status: DISCONTINUED | OUTPATIENT
Start: 2018-10-01 | End: 2018-10-02

## 2018-10-01 RX ORDER — KETOTIFEN FUMARATE 0.35 MG/ML
1 SOLUTION/ DROPS OPHTHALMIC 2 TIMES DAILY PRN
Status: DISCONTINUED | OUTPATIENT
Start: 2018-10-01 | End: 2018-10-03

## 2018-10-01 RX ORDER — POLYETHYLENE GLYCOL 3350 17 G/17G
17 POWDER, FOR SOLUTION ORAL DAILY PRN
Status: DISCONTINUED | OUTPATIENT
Start: 2018-10-01 | End: 2018-10-03

## 2018-10-01 RX ORDER — FAMOTIDINE 20 MG/1
20 TABLET ORAL 2 TIMES DAILY
Status: DISCONTINUED | OUTPATIENT
Start: 2018-10-01 | End: 2018-10-03

## 2018-10-01 RX ORDER — MORPHINE SULFATE 10 MG/ML
6 INJECTION, SOLUTION INTRAMUSCULAR; INTRAVENOUS EVERY 10 MIN PRN
Status: DISCONTINUED | OUTPATIENT
Start: 2018-10-01 | End: 2018-10-01 | Stop reason: HOSPADM

## 2018-10-01 RX ORDER — ONDANSETRON 2 MG/ML
INJECTION INTRAMUSCULAR; INTRAVENOUS AS NEEDED
Status: DISCONTINUED | OUTPATIENT
Start: 2018-10-01 | End: 2018-10-01 | Stop reason: SURG

## 2018-10-01 RX ORDER — BISACODYL 10 MG
10 SUPPOSITORY, RECTAL RECTAL
Status: DISCONTINUED | OUTPATIENT
Start: 2018-10-01 | End: 2018-10-03

## 2018-10-01 RX ORDER — ZOLPIDEM TARTRATE 5 MG/1
5 TABLET ORAL NIGHTLY PRN
Status: DISCONTINUED | OUTPATIENT
Start: 2018-10-01 | End: 2018-10-03

## 2018-10-01 RX ORDER — ATENOLOL 50 MG/1
25 TABLET ORAL NIGHTLY
Status: DISCONTINUED | OUTPATIENT
Start: 2018-10-01 | End: 2018-10-02

## 2018-10-01 RX ORDER — SODIUM CHLORIDE, SODIUM LACTATE, POTASSIUM CHLORIDE, CALCIUM CHLORIDE 600; 310; 30; 20 MG/100ML; MG/100ML; MG/100ML; MG/100ML
INJECTION, SOLUTION INTRAVENOUS CONTINUOUS
Status: DISCONTINUED | OUTPATIENT
Start: 2018-10-01 | End: 2018-10-01

## 2018-10-01 RX ORDER — NALOXONE HYDROCHLORIDE 0.4 MG/ML
80 INJECTION, SOLUTION INTRAMUSCULAR; INTRAVENOUS; SUBCUTANEOUS AS NEEDED
Status: ACTIVE | OUTPATIENT
Start: 2018-10-01 | End: 2018-10-01

## 2018-10-01 RX ORDER — DEXTROSE MONOHYDRATE 25 G/50ML
50 INJECTION, SOLUTION INTRAVENOUS
Status: DISCONTINUED | OUTPATIENT
Start: 2018-10-01 | End: 2018-10-01 | Stop reason: HOSPADM

## 2018-10-01 RX ORDER — ONDANSETRON 2 MG/ML
4 INJECTION INTRAMUSCULAR; INTRAVENOUS EVERY 6 HOURS PRN
Status: DISCONTINUED | OUTPATIENT
Start: 2018-10-01 | End: 2018-10-03

## 2018-10-01 RX ORDER — ROCURONIUM BROMIDE 10 MG/ML
INJECTION, SOLUTION INTRAVENOUS AS NEEDED
Status: DISCONTINUED | OUTPATIENT
Start: 2018-10-01 | End: 2018-10-01 | Stop reason: SURG

## 2018-10-01 RX ORDER — ONDANSETRON 2 MG/ML
4 INJECTION INTRAMUSCULAR; INTRAVENOUS ONCE AS NEEDED
Status: COMPLETED | OUTPATIENT
Start: 2018-10-01 | End: 2018-10-01

## 2018-10-01 RX ORDER — MORPHINE SULFATE 2 MG/ML
2 INJECTION, SOLUTION INTRAMUSCULAR; INTRAVENOUS EVERY 2 HOUR PRN
Status: DISCONTINUED | OUTPATIENT
Start: 2018-10-01 | End: 2018-10-03

## 2018-10-01 RX ORDER — CEFAZOLIN SODIUM/WATER 2 G/20 ML
2 SYRINGE (ML) INTRAVENOUS ONCE
Status: COMPLETED | OUTPATIENT
Start: 2018-10-01 | End: 2018-10-01

## 2018-10-01 RX ORDER — SODIUM CHLORIDE, SODIUM LACTATE, POTASSIUM CHLORIDE, CALCIUM CHLORIDE 600; 310; 30; 20 MG/100ML; MG/100ML; MG/100ML; MG/100ML
INJECTION, SOLUTION INTRAVENOUS CONTINUOUS
Status: DISCONTINUED | OUTPATIENT
Start: 2018-10-01 | End: 2018-10-01 | Stop reason: HOSPADM

## 2018-10-01 RX ORDER — FAMOTIDINE 20 MG/1
20 TABLET ORAL ONCE
Status: COMPLETED | OUTPATIENT
Start: 2018-10-01 | End: 2018-10-01

## 2018-10-01 RX ORDER — NEOSTIGMINE METHYLSULFATE 0.5 MG/ML
INJECTION INTRAVENOUS AS NEEDED
Status: DISCONTINUED | OUTPATIENT
Start: 2018-10-01 | End: 2018-10-01 | Stop reason: SURG

## 2018-10-01 RX ORDER — ACETAMINOPHEN 325 MG/1
650 TABLET ORAL EVERY 6 HOURS PRN
Status: DISCONTINUED | OUTPATIENT
Start: 2018-10-01 | End: 2018-10-03

## 2018-10-01 RX ORDER — DOCUSATE SODIUM 100 MG/1
100 CAPSULE, LIQUID FILLED ORAL 2 TIMES DAILY
Status: DISCONTINUED | OUTPATIENT
Start: 2018-10-01 | End: 2018-10-03

## 2018-10-01 RX ORDER — METOCLOPRAMIDE 10 MG/1
10 TABLET ORAL ONCE
Status: COMPLETED | OUTPATIENT
Start: 2018-10-01 | End: 2018-10-01

## 2018-10-01 RX ORDER — HYDROCODONE BITARTRATE AND ACETAMINOPHEN 5; 325 MG/1; MG/1
2 TABLET ORAL AS NEEDED
Status: DISCONTINUED | OUTPATIENT
Start: 2018-10-01 | End: 2018-10-01 | Stop reason: HOSPADM

## 2018-10-01 RX ADMIN — ONDANSETRON 4 MG: 2 INJECTION INTRAMUSCULAR; INTRAVENOUS at 16:23:00

## 2018-10-01 RX ADMIN — GLYCOPYRROLATE 0.8 MG: 0.2 INJECTION INTRAMUSCULAR; INTRAVENOUS at 16:34:00

## 2018-10-01 RX ADMIN — ROCURONIUM BROMIDE 50 MG: 10 INJECTION, SOLUTION INTRAVENOUS at 12:05:00

## 2018-10-01 RX ADMIN — SODIUM CHLORIDE, SODIUM LACTATE, POTASSIUM CHLORIDE, CALCIUM CHLORIDE: 600; 310; 30; 20 INJECTION, SOLUTION INTRAVENOUS at 12:44:00

## 2018-10-01 RX ADMIN — DEXAMETHASONE SODIUM PHOSPHATE 4 MG: 4 MG/ML VIAL (ML) INJECTION at 12:23:00

## 2018-10-01 RX ADMIN — NEOSTIGMINE METHYLSULFATE 4 MG: 0.5 INJECTION INTRAVENOUS at 16:34:00

## 2018-10-01 RX ADMIN — MIDAZOLAM HYDROCHLORIDE 2 MG: 1 INJECTION INTRAMUSCULAR; INTRAVENOUS at 12:00:00

## 2018-10-01 RX ADMIN — ROCURONIUM BROMIDE 10 MG: 10 INJECTION, SOLUTION INTRAVENOUS at 12:50:00

## 2018-10-01 RX ADMIN — SODIUM CHLORIDE, SODIUM LACTATE, POTASSIUM CHLORIDE, CALCIUM CHLORIDE: 600; 310; 30; 20 INJECTION, SOLUTION INTRAVENOUS at 16:22:00

## 2018-10-01 RX ADMIN — LIDOCAINE HYDROCHLORIDE 50 MG: 10 INJECTION, SOLUTION EPIDURAL; INFILTRATION; INTRACAUDAL; PERINEURAL at 12:05:00

## 2018-10-01 RX ADMIN — CEFAZOLIN SODIUM/WATER 2 G: 2 G/20 ML SYRINGE (ML) INTRAVENOUS at 12:20:00

## 2018-10-01 NOTE — ANESTHESIA PROCEDURE NOTES
ANESTHESIA INTUBATION  Urgency: elective      General Information and Staff    Patient location during procedure: OR  Anesthesiologist: Kaity Hodges MD  Resident/CRNA: Mendy Pavon CRNA  Performed: CRNA     Indications and Patient Condition  Indicat

## 2018-10-01 NOTE — INTERVAL H&P NOTE
Pre-op Diagnosis: Right kidney tumor    The above referenced H&P was reviewed by Meet Valdovinos MD on 10/1/2018, the patient was examined and no significant changes have occurred in the patient's condition since the H&P was performed.   I discussed with

## 2018-10-01 NOTE — H&P
[facility]    History and Physical    Louann Salmon Patient Status:  Hospital Outpatient Surgery    1970 MRN [id number]   Location [address] Attending Klaudia Paz MD   Hosp Day # 0 PCP Smokeless tobacco: Never Used    Alcohol use: Yes      Alcohol/week: 0.0 oz      Comment: occassionally     Drug use: No    Allergies/Medications: Allergies:    Ace Inhibitors          Coughing    Comment:Lisinopril  Metformin               DIARRHEA Cardiovascular: Negative for chest pain, palpitations and leg swelling. Gastrointestinal: Negative for heartburn, nausea, vomiting, abdominal pain, diarrhea, constipation, blood in stool, abdominal distention, anal bleeding and rectal pain.    Endocrine: Eyes: Conjunctivae and lids are normal. Pupils are equal, round, and reactive to light. Right eye exhibits no chemosis, no discharge, no exudate and no hordeolum. No foreign body present in the right eye.  Left eye exhibits no chemosis, no discharge, no exu Left: No supraclavicular adenopathy present. Neurological: She is alert and oriented to person, place, and time. Skin: Skin is warm and dry. No lesion and no rash noted. She is not diaphoretic. No erythema. No pallor.    Psychiatric: She has a norm

## 2018-10-01 NOTE — ANESTHESIA PREPROCEDURE EVALUATION
Anesthesia PreOp Note    HPI:     Yudy Peck is a 50year old female who presents for preoperative consultation requested by: Umair Blood MD    Date of Surgery: 10/1/2018    Procedure(s):  LAPAROSCOPIC NEPHRECTOMY  Indication: Right kidney t OTHER; Right      Comment:  arthroscopy bone spur removal   No date: REMOVAL GALLBLADDER      Medications Prior to Admission:  Clindamycin Phosphate 1 % External Foam apply to affected areas on the back, arms and legs BID PRN for flares.  Disp:  Rfl:  9/30/ Erna Andersen MD     No current [facility]-ordered outpatient medications on file.       Ace Inhibitors          Coughing    Comment:Lisinopril  Metformin               DIARRHEA    Family History   Problem Relation Age of Onset   • Diabetes Father    • Arthr K 3.7 09/25/2018     09/25/2018    CO2 23 09/25/2018    BUN 13 09/25/2018    CREATSERUM 0.82 09/25/2018     (H) 09/25/2018    PGLU 138 (H) 10/01/2018    CA 9.1 09/25/2018          Vital Signs: Body mass index is 38.58 kg/m².    height is 1.676

## 2018-10-01 NOTE — ANESTHESIA POSTPROCEDURE EVALUATION
Patient: Louann Salmon    Procedure Summary     Date:  10/01/18 Room / Location:  [address] [place] [address] MAIN OR 05 / [address] [place] [address] OR    Anesthesia Start:  1200 Anesthesia Stop:      Procedure:  LAPAROSCOPIC NEPHRECTOMY (Right Abdomen) Diagnosis:  (Right kidney tumor)    Aaron

## 2018-10-01 NOTE — BRIEF OP NOTE
Pre-Operative Diagnosis: Right kidney tumor lower pole     Post-Operative Diagnosis: Same with multiple intra-abdominal adhesions     Procedure Performed:   Procedure(s):  Right laparoscopic partial nephrectomy and assist with intraoperative ultrasound of

## 2018-10-02 ENCOUNTER — APPOINTMENT (OUTPATIENT)
Dept: GENERAL RADIOLOGY | Facility: HOSPITAL | Age: 48
DRG: 657 | End: 2018-10-02
Attending: INTERNAL MEDICINE
Payer: COMMERCIAL

## 2018-10-02 PROCEDURE — 71045 X-RAY EXAM CHEST 1 VIEW: CPT | Performed by: INTERNAL MEDICINE

## 2018-10-02 PROCEDURE — 99233 SBSQ HOSP IP/OBS HIGH 50: CPT | Performed by: INTERNAL MEDICINE

## 2018-10-02 RX ORDER — ACETAMINOPHEN AND CODEINE PHOSPHATE 300; 15 MG/1; MG/1
2 TABLET ORAL EVERY 6 HOURS PRN
Status: DISCONTINUED | OUTPATIENT
Start: 2018-10-02 | End: 2018-10-02

## 2018-10-02 RX ORDER — ATENOLOL 25 MG/1
12.5 TABLET ORAL NIGHTLY
Status: DISCONTINUED | OUTPATIENT
Start: 2018-10-02 | End: 2018-10-03

## 2018-10-02 RX ORDER — ACETAMINOPHEN AND CODEINE PHOSPHATE 300; 30 MG/1; MG/1
2 TABLET ORAL EVERY 6 HOURS PRN
Status: DISCONTINUED | OUTPATIENT
Start: 2018-10-02 | End: 2018-10-03

## 2018-10-02 NOTE — PROGRESS NOTES
Ira Molina is a 50year old female. HPI:   No chief complaint on file. Patient doing well this morning I saw her early. She is in the stepdown unit. But is ready for transfer to the floor. She has no flank or abdominal pain.   Her urine outpu cartilage shoulder  2014: CARPAL TUNNEL RELEASE; Right  No date: CHOLECYSTECTOMY  No date: CORRECT BUNION,OTHR METHODS; Right  No date: HIP REPLACEMENT SURGERY;  Left  9/21/2016: LAPAROSCOPIC CHOLECYSTECTOMY  10/1/2018: LAPAROSCOPIC NEPHRECTOMY; Right Intravenous, Q2H PRN **OR** morphINE sulfate (PF) 4 MG/ML injection 4 mg, 4 mg, Intravenous, Q2H PRN **OR** morphINE sulfate (PF) 4 MG/ML injection 6 mg, 6 mg, Intravenous, Q2H PRN  •  diphenhydrAMINE (BENADRYL) cap/tab 25 mg, 25 mg, Oral, Nightly PRN **OR fever, chills  ENT: denies sore throat, nasal drainage/congestion  CHEST/CVS: denies cough, sputum, trouble breathing/SOB, chest pain, DANG  GI: denies abdominal pain, heartburn, diarrhea, blood in bm, tarry bm, constipation,    : denies difficulty urinat Results   Component Value Date    CREATSERUM 1.23 10/02/2018       Estimated Creatinine Clearance: 51.8 mL/min (based on SCr of 1.23 mg/dL).     Lab Results   Component Value Date    BUN 9 10/02/2018     (L) 10/02/2018    K 4.5 10/02/2018     10 hypodense. There are cholecystectomy clips. CONCLUSION:   No evidence of thoracic metastatic disease.   Dictated by (CST): Ronda Joiner MD on 9/25/2018 at 16:47     Approved by (CST): Ronda Joiner MD on 9/25/2018 at 16:52                Norma

## 2018-10-02 NOTE — PAYOR COMM NOTE
--------------  ADMISSION REVIEW     Payor: Romel Araujo #:  [id number]  Authorization Number: [id number]    Admit date: 10/1/18  Admit time: 6606       Admitting Physician: Kvng Moore MD  Attending Physician:  Kvng Moore MD 2005:  OTHER; Right      Comment:  arthroscopy bone spur removal   No date: REMOVAL GALLBLADDER  Family History   Problem Relation Age of Onset   • Diabetes Father    • Arthritis Father    • Hypertension Father    • Heart Disease Father 76        aortic jostin LO LOESTRIN FE 1 MG-10 MCG / 10 MCG Oral Tab        Review of Systems:     Constitutional: Negative for activity change, appetite change, chills, diaphoresis, fatigue, fever and unexpected weight change.    HENT: Negative for congestion, dental problem, clary Constitutional: She is oriented to person, place, and time. Vital signs are normal. She appears well-developed and well-nourished. Non-toxic appearance. She does not have a sickly appearance. She does not appear ill. No distress. She is not intubated.  Regino Abdominal: She exhibits no distension. There is tenderness. There is no rebound and no guarding. Lymphadenopathy:        Head (right side): No submental, no submandibular, no preauricular, no posterior auricular and no occipital adenopathy present. Diabetes type 2. Not well controlled. We will do insulin and monitor. No oral hypoglycemics while in hospital.  Sliding scale insulin, hypoglycemia protocol, Accu-Cheks. Leukocytosis. May be elevated postop or atelectasis? .  Incentive spirome 10/1/2018 1734 Given 50 mcg Intravenous Dede Barroso RN      fentaNYL citrate (SUBLIMAZE) 0.05 MG/ML injection     Date Action Dose Route User    10/1/2018 1554 Given 50 mcg Intravenous Laura Hampton MD    10/1/2018 1355 Given 50 mcg Intra neostigmine methylsulfate (BLOXIVERZ) injection     Date Action Dose Route User    10/1/2018 1634 Given 4 mg Intravenous Rupesh Gordon MD      Normal Saline Flush 0.9 % injection 10 mL     Date Action Dose Route User    10/2/2018 0027 Given 10 mL Lennie Melina 10/1/2018 2100 New Bag (none) Intravenous Jase Sanchez RN          Brief Op Note signed by Klaudia Paz MD at 10/1/2018  4:41 PM     Author: Klaudia Paz MD Service: Urology Author Type: Physician   Filed: 10/1/2018  4:41 PM Date of Servic

## 2018-10-02 NOTE — OPERATIVE REPORT
[facility]    PATIENT'S NAME: Anna Reeder   ATTENDING PHYSICIAN: Leonila Perkins MD   OPERATING PHYSICIAN: Bakari Miller MD   PATIENT ACCOUNT#:   [id number]    LOCATION:  [address] [place] [address] RECORD #:   [id number]       DATE OF BI proceed. PROCEDURE:  Patient was brought to the operating theater. She had a bowel prep. She was given Ancef 2 g. She was brought to the operating theater where general anesthesia was administered. Oneal catheter was inserted.   She was carefully pre intraoperatively and noted the tumor was about 4 cm. We identified its margins for where to do our cut. We now cross-clamped the arteries and the vein, and then did cold scissors and cut out the entire tumor. We were in the collecting system.   We first Vicryl, passing on either side of the fascia of the main camera port and closed this. There was no hernia here. We did copious amounts of irrigation.   We then closed the fascia of the Samara Saris modified incision in 1 layer with an 0 looped PDS starting at b

## 2018-10-02 NOTE — PROGRESS NOTES
[facility]    Progress Note    Jodee Saleh Patient Status:  Inpatient    1970 MRN [id number]   Location [facility] 2W/ Attending Ashutosh Gallardo MD   [facility] Day # 1 PCP Bobby Cosby.  Neema Morales MD        Subjective: Constitutional: She is oriented to person, place, and time. She appears well-developed and well-nourished. No distress. She is not intubated.    HENT:   Mouth/Throat: Oropharynx is clear and moist. No oropharyngeal exudate or posterior oropharyngeal erythem May be elevated postop or atelectasis? .  Incentive spirometry. Early ambulation. Monitor. No resp. Sx. No fever. Check CXR. Check urine.         AK I. Slightly worse. Will monitor. Oneal in to monitor urine output. UO is good. Renal consulted.

## 2018-10-02 NOTE — PLAN OF CARE
Problem: Patient Centered Care  Goal: Patient preferences are identified and integrated in the patient's plan of care  Interventions:  - What would you like us to know as we care for you?   - Provide timely, complete, and accurate information to patient/fa [facility] - ADULT  Goal: Incision(s), wounds(s) or drain site(s) healing without S/S of infection  INTERVENTIONS:  - Assess and document risk factors for pressure ulcer development  - Assess and document skin integrity  - Assess and document dressing/incis

## 2018-10-02 NOTE — PHYSICAL THERAPY NOTE
PHYSICAL THERAPY EVALUATION - INPATIENT     Room Number: 463/463-A  Evaluation Date: 10/2/2018  Type of Evaluation: Initial           Reason for Therapy: Mobility Dysfunction and Discharge Planning    PHYSICAL THERAPY ASSESSMENT     Patient is a 50 year o the main level    SUBJECTIVE  \"I'm good got to get back to work in a week\"    PHYSICAL THERAPY EXAMINATION     OBJECTIVE       PAIN ASSESSMENT  Ratin          COGNITION  · Overall Cognitive Status:  WFL - within functional limits    RANGE OF MOTION A

## 2018-10-03 VITALS
OXYGEN SATURATION: 93 % | HEIGHT: 66 IN | DIASTOLIC BLOOD PRESSURE: 53 MMHG | SYSTOLIC BLOOD PRESSURE: 121 MMHG | WEIGHT: 245.81 LBS | BODY MASS INDEX: 39.51 KG/M2 | HEART RATE: 76 BPM | RESPIRATION RATE: 16 BRPM | TEMPERATURE: 99 F

## 2018-10-03 PROCEDURE — 99254 IP/OBS CNSLTJ NEW/EST MOD 60: CPT | Performed by: INTERNAL MEDICINE

## 2018-10-03 PROCEDURE — 99239 HOSP IP/OBS DSCHRG MGMT >30: CPT | Performed by: INTERNAL MEDICINE

## 2018-10-03 RX ORDER — ATENOLOL 25 MG/1
12.5 TABLET ORAL NIGHTLY
Qty: 30 TABLET | Refills: 0 | Status: SHIPPED | OUTPATIENT
Start: 2018-10-03 | End: 2018-10-11

## 2018-10-03 RX ORDER — POTASSIUM CHLORIDE 20 MEQ/1
40 TABLET, EXTENDED RELEASE ORAL EVERY 4 HOURS
Status: COMPLETED | OUTPATIENT
Start: 2018-10-03 | End: 2018-10-03

## 2018-10-03 RX ORDER — ACETAMINOPHEN AND CODEINE PHOSPHATE 300; 30 MG/1; MG/1
1-2 TABLET ORAL EVERY 4 HOURS PRN
Qty: 24 TABLET | Refills: 0 | Status: SHIPPED | OUTPATIENT
Start: 2018-10-03 | End: 2018-10-29 | Stop reason: ALTCHOICE

## 2018-10-03 RX ORDER — AMLODIPINE BESYLATE 2.5 MG/1
2.5 TABLET ORAL DAILY
Qty: 30 TABLET | Refills: 0 | Status: SHIPPED | OUTPATIENT
Start: 2018-10-04 | End: 2018-10-11

## 2018-10-03 NOTE — PROGRESS NOTES
Javon Metzger is a 50year old female. HPI:   No chief complaint on file. Patient is doing well. She is tolerating her diet. Her labs are normalizing. She has no flank or abdominal pain.   Has very mild incisional pain but Tylenol with codeine i Left  9/21/2016: LAPAROSCOPIC CHOLECYSTECTOMY  10/1/2018: LAPAROSCOPIC NEPHRECTOMY; Right      Comment:  Performed by Viridiana Justin MD at [facility] MAIN OR  2005: OTHER; Right      Comment:  arthroscopy bone spur removal   No date: [address] Q2H PRN **OR** morphINE sulfate (PF) 4 MG/ML injection 4 mg, 4 mg, Intravenous, Q2H PRN **OR** morphINE sulfate (PF) 4 MG/ML injection 6 mg, 6 mg, Intravenous, Q2H PRN  •  diphenhydrAMINE (BENADRYL) cap/tab 25 mg, 25 mg, Oral, Nightly PRN **OR** Diphenhydr chills  ENT: denies sore throat, nasal drainage/congestion  CHEST/CVS: denies cough, sputum, trouble breathing/SOB, chest pain, DANG  GI: denies abdominal pain, heartburn, diarrhea, blood in bm, tarry bm, constipation,    : denies difficulty urinating, pa 59 mL/min (based on SCr of 1.08 mg/dL).     Lab Results   Component Value Date    BUN 10 10/03/2018     10/03/2018    K 3.5 10/03/2018     10/03/2018    CO2 24 10/03/2018     (H) 10/03/2018    CA 8.2 (L) 10/03/2018    ALB 3.8 08/31/2018 on 9/25/2018 at 16:52          Xr Chest Ap Portable  (cpt=71045)    Result Date: 10/2/2018  PROCEDURE: XR CHEST AP PORTABLE (CPT=71010) TIME: 12:41. COMPARISON: [facility], MRI MRCP ABDOMEN WO CONTRAST, 8/10/2016, 20:24.   Sebastián Lees without long-term current use of insulin (HCC)     Elevated liver enzymes     HTN (hypertension)     Hypothyroid     Migraine     Obesity     PCOS (polycystic ovarian syndrome)     DM (diabetes mellitus) ([facility] [address]     Fatty liver disease, nonalcoholic

## 2018-10-03 NOTE — CM/SW NOTE
SW received MD order for discharge planning. Pt has been screened per chart review and during nursing rounds. Pt is from home w/ spouse, self care. Pt has no identified needs at this time. SW/CM will remain available for support and any discharge needs.

## 2018-10-03 NOTE — PLAN OF CARE
Diabetes/Glucose Control    • Glucose maintained within prescribed range Adequate for Discharge        GENITOURINARY - ADULT    • Absence of urinary retention Adequate for Discharge        Patient Centered Care    • Patient preferences are identified and i

## 2018-10-03 NOTE — DISCHARGE SUMMARY
DISCHARGE SUMMARY     Jodee Saleh Patient Status:  Inpatient    1970 MRN [id number]   Location [facility] 4W/SW/SE Attending Ashutosh Gallardo MD   [facility] Day # 2 PCP Bobby Cosby.  Neema Morales MD     Date of Admission: 10/1/2018  Date of Disc clear to auscultation bilaterally  Cardiovascular: S1, S2 normal  Abdominal: soft, bowel sounds hypoactive  Extremities: extremities normal, atraumatic, no cyanosis or edema  Pulses: 2+ and symmetric    Procedures during hospitalization:   •     Incidental Refills:  0     spironolactone 100 MG Tabs  Commonly known as:  ALDACTONE      1 po qam and 1/2 tab in PM.   Quantity:  32 tablet  Refills:  0     triamcinolone acetonide 0.1 % Crea  Commonly known as:  KENALOG      Use to AA's BID for no longer than 2 wee Diabetic diet    Discharge Activity: As tolerated    Follow-up appointment:   Abraham Holden MD  [address]  [address] [place] [address] [phone]    Schedule an appointment as soon as possible for a visit in 2 weeks      Quentin Durbin,

## 2018-10-03 NOTE — DISCHARGE SUMMARY
[facility]    Discharge Summary    Bertram Strauss Patient Status:  Inpatient    1970 MRN [id number]   Location [facility] 4W/SW/SE Attending Adeline Chao MD   [facility] Day # 2 PCP Katie Mayes.  Yohannes Hodges MD     Date of Adm Abdomen:     Soft, non-tender, bowel sounds active all four quadrants,     no masses, no organomegaly           Extremities:   Extremities normal, atraumatic, no cyanosis or edema   Pulses:   2+ and symmetric all extremities   Skin:   Skin color, laurel IN DOSE, NOW TAKING TWICE A DAY)   Quantity:  180 tablet  Refills:  0     atenolol 25 MG Tabs  Commonly known as:  TENORMIN      TAKE 1 TABLET EVERY EVENING AT BEDTIME   Quantity:  90 tablet  Refills:  0     Candesartan Cilexetil 8 MG Tabs  Commonly known any swelling in her legs or chest pain bleeding severe pain in the abdomen leakage from her wounds fever chills or any other medical abnormalities to call us immediately or go to the emergency room.   She will follow-up with me in the office in 2 weeks she

## 2018-10-04 ENCOUNTER — TELEPHONE (OUTPATIENT)
Dept: NEPHROLOGY | Facility: CLINIC | Age: 48
End: 2018-10-04

## 2018-10-04 DIAGNOSIS — N28.89 RIGHT RENAL MASS: Primary | ICD-10-CM

## 2018-10-04 NOTE — TELEPHONE ENCOUNTER
Contacted pt. Scheduled appt with [facility] for 10/29/18 at 4:40 PM. Pt states she was suppose to remind [facility] to put in lab orders for her to do prior to appt.

## 2018-10-04 NOTE — TELEPHONE ENCOUNTER
Pt was seen by [facility] after surgical procedure yesterday and states per [facility] pt should be seen the week of 10/29/18.  Please call thank you [phone]

## 2018-10-04 NOTE — CONSULTS
[facility]    PATIENT'S NAME: Dev China   ATTENDING PHYSICIAN: Leonila Benites MD   CONSULTING PHYSICIAN: Marika Christina.  Loretta Keith MD   PATIENT ACCOUNT#:   [id number]    LOCATION:  [address] [place] [address] 92 #:   [id number]       DATE OF cell type. No extracapsular invasion noted, and the tumor was at least 3 mm from the closest margin. No lymph nodes noted that were positive. The patient did well with surgery and is ready to go home today.       PHYSICAL EXAMINATION:    GENERAL:  Collie Dawley

## 2018-10-05 ENCOUNTER — APPOINTMENT (OUTPATIENT)
Dept: GENERAL RADIOLOGY | Facility: HOSPITAL | Age: 48
DRG: 389 | End: 2018-10-05
Attending: EMERGENCY MEDICINE
Payer: COMMERCIAL

## 2018-10-05 ENCOUNTER — HOSPITAL ENCOUNTER (OUTPATIENT)
Facility: HOSPITAL | Age: 48
Setting detail: OBSERVATION
Discharge: HOME | DRG: 389 | End: 2018-10-08
Attending: EMERGENCY MEDICINE | Admitting: INTERNAL MEDICINE
Payer: COMMERCIAL

## 2018-10-05 ENCOUNTER — TELEPHONE (OUTPATIENT)
Dept: INTERNAL MEDICINE CLINIC | Facility: CLINIC | Age: 48
End: 2018-10-05

## 2018-10-05 ENCOUNTER — APPOINTMENT (OUTPATIENT)
Dept: CT IMAGING | Facility: HOSPITAL | Age: 48
DRG: 389 | End: 2018-10-05
Attending: EMERGENCY MEDICINE
Payer: COMMERCIAL

## 2018-10-05 DIAGNOSIS — K59.00 CONSTIPATION, UNSPECIFIED CONSTIPATION TYPE: ICD-10-CM

## 2018-10-05 DIAGNOSIS — D72.829 LEUKOCYTOSIS, UNSPECIFIED TYPE: Primary | ICD-10-CM

## 2018-10-05 DIAGNOSIS — N15.9 KIDNEY INFECTION: ICD-10-CM

## 2018-10-05 DIAGNOSIS — IMO0001 UNCONTROLLED TYPE 2 DIABETES MELLITUS WITHOUT COMPLICATION, WITHOUT LONG-TERM CURRENT USE OF INSULIN: ICD-10-CM

## 2018-10-05 PROCEDURE — 74177 CT ABD & PELVIS W/CONTRAST: CPT | Performed by: EMERGENCY MEDICINE

## 2018-10-05 PROCEDURE — 74019 RADEX ABDOMEN 2 VIEWS: CPT | Performed by: EMERGENCY MEDICINE

## 2018-10-05 RX ORDER — SODIUM PHOSPHATE, DIBASIC AND SODIUM PHOSPHATE, MONOBASIC 7; 19 G/133ML; G/133ML
1 ENEMA RECTAL ONCE
Status: COMPLETED | OUTPATIENT
Start: 2018-10-05 | End: 2018-10-05

## 2018-10-05 NOTE — TELEPHONE ENCOUNTER
Patient had surgery on Monday and no BM yet. Drinking lots of fluids and walking as much as she can. Surgeon recommended colace. No answer at surgeon's office today. Tried Milk of Magnesia last night to no avail. Asking for other recommendations.

## 2018-10-06 ENCOUNTER — APPOINTMENT (OUTPATIENT)
Dept: GENERAL RADIOLOGY | Facility: HOSPITAL | Age: 48
DRG: 389 | End: 2018-10-06
Attending: EMERGENCY MEDICINE
Payer: COMMERCIAL

## 2018-10-06 PROBLEM — D72.829 LEUKOCYTOSIS, UNSPECIFIED TYPE: Status: ACTIVE | Noted: 2018-10-06

## 2018-10-06 PROBLEM — D72.829 LEUKOCYTOSIS: Status: ACTIVE | Noted: 2018-10-06

## 2018-10-06 PROBLEM — Z90.5 STATUS POST NEPHRECTOMY: Status: ACTIVE | Noted: 2018-10-06

## 2018-10-06 PROBLEM — K59.00 CONSTIPATION, UNSPECIFIED CONSTIPATION TYPE: Status: ACTIVE | Noted: 2018-10-06

## 2018-10-06 PROBLEM — N15.9 KIDNEY INFECTION: Status: ACTIVE | Noted: 2018-10-06

## 2018-10-06 PROCEDURE — 71045 X-RAY EXAM CHEST 1 VIEW: CPT | Performed by: EMERGENCY MEDICINE

## 2018-10-06 PROCEDURE — 99219 INITIAL OBSERVATION CARE,LEVL II: CPT | Performed by: INTERNAL MEDICINE

## 2018-10-06 PROCEDURE — 99253 IP/OBS CNSLTJ NEW/EST LOW 45: CPT | Performed by: INTERNAL MEDICINE

## 2018-10-06 RX ORDER — SPIRONOLACTONE 100 MG/1
100 TABLET, FILM COATED ORAL DAILY
Status: DISCONTINUED | OUTPATIENT
Start: 2018-10-06 | End: 2018-10-08

## 2018-10-06 RX ORDER — ACETAMINOPHEN AND CODEINE PHOSPHATE 300; 30 MG/1; MG/1
1 TABLET ORAL EVERY 6 HOURS PRN
Status: DISCONTINUED | OUTPATIENT
Start: 2018-10-06 | End: 2018-10-08

## 2018-10-06 RX ORDER — LOSARTAN POTASSIUM 25 MG/1
25 TABLET ORAL DAILY
Status: DISCONTINUED | OUTPATIENT
Start: 2018-10-06 | End: 2018-10-08

## 2018-10-06 RX ORDER — ATENOLOL 25 MG/1
12.5 TABLET ORAL NIGHTLY
Status: DISCONTINUED | OUTPATIENT
Start: 2018-10-06 | End: 2018-10-08

## 2018-10-06 RX ORDER — MORPHINE SULFATE 2 MG/ML
1 INJECTION, SOLUTION INTRAMUSCULAR; INTRAVENOUS EVERY 4 HOURS PRN
Status: DISCONTINUED | OUTPATIENT
Start: 2018-10-06 | End: 2018-10-08

## 2018-10-06 RX ORDER — AMLODIPINE BESYLATE 2.5 MG/1
2.5 TABLET ORAL DAILY
Status: DISCONTINUED | OUTPATIENT
Start: 2018-10-06 | End: 2018-10-08

## 2018-10-06 RX ORDER — SODIUM CHLORIDE 9 MG/ML
INJECTION, SOLUTION INTRAVENOUS CONTINUOUS
Status: DISCONTINUED | OUTPATIENT
Start: 2018-10-06 | End: 2018-10-08

## 2018-10-06 RX ORDER — POLYETHYLENE GLYCOL 3350 17 G/17G
17 POWDER, FOR SOLUTION ORAL 2 TIMES DAILY
Status: DISCONTINUED | OUTPATIENT
Start: 2018-10-06 | End: 2018-10-07

## 2018-10-06 RX ORDER — ALVIMOPAN 12 MG/1
12 CAPSULE ORAL 2 TIMES DAILY
Status: DISCONTINUED | OUTPATIENT
Start: 2018-10-06 | End: 2018-10-08

## 2018-10-06 RX ORDER — DOCUSATE SODIUM 100 MG/1
100 CAPSULE, LIQUID FILLED ORAL 2 TIMES DAILY
Status: DISCONTINUED | OUTPATIENT
Start: 2018-10-06 | End: 2018-10-06

## 2018-10-06 RX ORDER — BISACODYL 10 MG
10 SUPPOSITORY, RECTAL RECTAL
Status: DISCONTINUED | OUTPATIENT
Start: 2018-10-06 | End: 2018-10-06

## 2018-10-06 RX ORDER — ONDANSETRON 2 MG/ML
4 INJECTION INTRAMUSCULAR; INTRAVENOUS EVERY 8 HOURS PRN
Status: DISCONTINUED | OUTPATIENT
Start: 2018-10-06 | End: 2018-10-08

## 2018-10-06 RX ORDER — BISACODYL 10 MG
10 SUPPOSITORY, RECTAL RECTAL DAILY
Status: DISCONTINUED | OUTPATIENT
Start: 2018-10-06 | End: 2018-10-07

## 2018-10-06 RX ORDER — KETOTIFEN FUMARATE 0.35 MG/ML
1 SOLUTION/ DROPS OPHTHALMIC 2 TIMES DAILY PRN
Status: DISCONTINUED | OUTPATIENT
Start: 2018-10-06 | End: 2018-10-08

## 2018-10-06 RX ORDER — OLOPATADINE HYDROCHLORIDE 1 MG/ML
1 SOLUTION/ DROPS OPHTHALMIC 2 TIMES DAILY PRN
Status: DISCONTINUED | OUTPATIENT
Start: 2018-10-06 | End: 2018-10-06

## 2018-10-06 RX ORDER — ONDANSETRON 2 MG/ML
4 INJECTION INTRAMUSCULAR; INTRAVENOUS ONCE
Status: COMPLETED | OUTPATIENT
Start: 2018-10-06 | End: 2018-10-06

## 2018-10-06 RX ORDER — SODIUM CHLORIDE 9 MG/ML
1000 INJECTION, SOLUTION INTRAVENOUS ONCE
Status: COMPLETED | OUTPATIENT
Start: 2018-10-06 | End: 2018-10-06

## 2018-10-06 RX ORDER — NAPROXEN 500 MG/1
500 TABLET ORAL EVERY 8 HOURS PRN
Status: DISCONTINUED | OUTPATIENT
Start: 2018-10-06 | End: 2018-10-08

## 2018-10-06 RX ORDER — HEPARIN SODIUM 5000 [USP'U]/ML
5000 INJECTION, SOLUTION INTRAVENOUS; SUBCUTANEOUS EVERY 8 HOURS SCHEDULED
Status: DISCONTINUED | OUTPATIENT
Start: 2018-10-06 | End: 2018-10-08

## 2018-10-06 RX ORDER — ACETAMINOPHEN 500 MG
500 TABLET ORAL EVERY 6 HOURS PRN
Status: DISCONTINUED | OUTPATIENT
Start: 2018-10-06 | End: 2018-10-08

## 2018-10-06 RX ORDER — KETOROLAC TROMETHAMINE 15 MG/ML
15 INJECTION, SOLUTION INTRAMUSCULAR; INTRAVENOUS EVERY 6 HOURS PRN
Status: DISPENSED | OUTPATIENT
Start: 2018-10-06 | End: 2018-10-08

## 2018-10-06 RX ORDER — LEVOTHYROXINE SODIUM 0.1 MG/1
100 TABLET ORAL
Status: DISCONTINUED | OUTPATIENT
Start: 2018-10-06 | End: 2018-10-08

## 2018-10-06 NOTE — ED INITIAL ASSESSMENT (HPI)
Pt DCd after partial right nephrectomy 10/1 and comes to us with c/o rlq and right sided back pain. + nausea. LBM 10/1 prior to surgery. Pt states she tried otc laxatives w no relief.  Pain 10/10

## 2018-10-06 NOTE — H&P
[facility]    History and Physical    William Fry Patient Status:  Inpatient    1970 MRN [id number]   Location [facility] 4W/SW/SE Attending Derek Velázquez MD   Hosp Day # 0 PCP Alfonso Marina MD     Date:  10/ Father    • Arthritis Father    • Hypertension Father    • Heart Disease Father 76        aortic valve disease   • Cancer Father         Skin cancer.     • Arthritis Mother    • Cancer Mother         lung, skin and MDS   • Hypertension Mother    • Heart Dis Constitutional: Negative. HENT: Negative. Eyes: Negative. Respiratory: Negative. Cardiovascular: Negative. Gastrointestinal: Positive for nausea, constipation and abdominal distention. Negative for vomiting.         Improved    Endocrine: 6.4 x 6.1 x 2.8 cm. Surrounding perinephric fluid and retroperitoneal fluid consistent with postoperative seroma/urinoma extending medially along the ureter and trace fluid inferiorly towards the iliac fossa. No contrast extravasation or acute hematoma.  2. constipation type- had bowel movement this am, continue with current care, clear liquid diet , will follow     Leukocytosis- a little improved, ua looks like possible infection, on IV rocephin will follow         Kidney infection- as above, urology on boar

## 2018-10-06 NOTE — ED PROVIDER NOTES
Patient Seen in: [facility] AND [facility] Emergency Department    History   Patient presents with:  Constipation (gastrointestinal)    Stated Complaint: constipation    HPI    Pt is 49 yo F s/p removal of renal cell carcinoma POD #4 who p/w 3-4 days of constipa /65   Pulse 65   Resp 18   Temp 98.3 °F (36.8 °C)   Temp src Oral   SpO2 93 %   O2 Device None (Room air)       Current:/75   Pulse 70   Temp 98.3 °F (36.8 °C) (Oral)   Resp 20   Ht 167.6 cm (5' 6\")   Wt 111.1 kg   LMP 09/24/2018   SpO2 94% Status                     ---------                               -----------         ------                     CBC W/ DIFFERENTIAL[[id number]]          Abnormal            Final result                 Please view results for these tests on the individual No evidence for hydronephrosis. The results were faxed/finalized only @ 11:44 PM ET  If you would like to discuss this case directly please call [phone] ([phone]).     Scar Morrison M.D.      AP chest x-ray  Comparison: Chest x-ray [place]

## 2018-10-06 NOTE — PROGRESS NOTES
Therapeutic interchange from   Olopatadine (Patanol) ophthalmic, solution 0.1%  Ketotifen (Zaditor) ophthalmic, solution 0.025% 1 drop twice daily   per P&T approved protocol.      Milton McgrathD

## 2018-10-06 NOTE — PLAN OF CARE
Patient Centered Care    • Patient preferences are identified and integrated in the patient's plan of care Progressing        Patient/Family Goals    • Patient/Family Long Term Goal Progressing    • Patient/Family Short Term Goal Progressing          Pt re

## 2018-10-06 NOTE — CONSULTS
Nusrat Black is a 50year old female. HPI:   Patient presents with:  Constipation (gastrointestinal)  Patient is status post a laparoscopic right partial nephrectomy on Monday. She went home on Wednesday.   Was doing well I called and checked on h Migraine     Obesity     PCOS (polycystic ovarian syndrome)     DM (diabetes mellitus) (HCC)     Fatty liver disease, nonalcoholic     Chronic back pain     Pre-op testing     Renal mass, right     Leukocytosis     Leukocytosis, unspecified type     Kidn Never Used    Alcohol use:  Yes      Alcohol/week: 0.0 oz      Comment: occassionally     Drug use: No       Medications :    Current Facility-Administered Medications:   •  0.9%  NaCl infusion, , Intravenous, Continuous  •  morphINE sulfate (PF) 2 MG/ML in negative  Genitourinary:negative  Hematologic/lymphatic: negative  Behavioral/Psych: negative  Endocrine: negative  Allergic/Immunologic:negative    PHYSICAL EXAM:     /67 (BP Location: Right arm)   Pulse 78   Temp 98.4 °F (36.9 °C) (Oral)   Resp 18 PSA        Recent Labs   Lab  10/05/18   2121   COLORUR  Yellow   CLARITY  Clear   SPECGRAVITY  1.016   GLUUR  Negative   BILUR  Negative   KETUR  Trace*   BLOODURINE  Small*   PHURINE  6.0   PROUR  30 *   UROBILINOGEN  <2.0   NITRITE  Negative   LEUUR  Tr flank and RLQ pain. TECHNIQUE: CT images of the abdomen and pelvis were obtained with non-ionic intravenous contrast material.  Automated exposure control for dose reduction was used. Adjustment of the mA and/or kV was done based on the patient's size.  Us cecum from previous appendectomy. ABDOMINAL WALL: Minimal induration of soft tissues right anterolateral flank with subcutaneous emphysema related to recent abdominal surgery URINARY BLADDER: No visible focal wall thickening, lesion or calculus.   PELVIC NO Chest Ap Portable  (cpt=71045)    Result Date: 10/6/2018  PROCEDURE: XR CHEST AP PORTABLE (CPT=71010) TIME: 0108 hrs. COMPARISON: [facility]. [facility], XR CHEST PA + LAT CHEST (CPT=71020), 1/02/2018, 15:24.   [facility], CT A cardiac silhouette abnormality or cardiomegaly. Unremarkable pulmonary vasculature. MEDIAST/CHANTELLE:   No visible mass or adenopathy. LUNGS/PLEURA: Suboptimal inspiration. Linear bibasilar atelectasis. .  No significant pulmonary parenchymal abnormalities. wall thickening/induration right lateral flank region from recent partial nephrectomy     Dictated by (CST): Mykel Murphy MD on 10/06/2018 at 6:53     Approved by (CST): Mykel Murphy MD on 10/06/2018 at 7:00                Review of previous program  Follow-up on urine culture and blood cultures will continue antibiotics for now           Deena Navas MD

## 2018-10-06 NOTE — CONSULTS
[facility]   G[facility]enterology Consultation Note     Ashutosh Coe  Patient Status:    Inpatient  Date of Admission:         10/5/2018, Hospital day #0  Attending:   Mackenzie Durbin MD  PCP:     Giuliana Horowitz MD    Reason fo and torn cartilage shoulder  2014: CARPAL TUNNEL RELEASE; Right  No date: CHOLECYSTECTOMY  No date: CORRECT BUNION,OTHR METHODS; Right  No date: HIP REPLACEMENT SURGERY;  Left  9/21/2016: LAPAROSCOPIC CHOLECYSTECTOMY  10/1/2018: LAPAROSCOPIC NEPHRECTOMY; Ri Heparin Sodium (Porcine) 5000 UNIT/ML injection 5,000 Units, 5,000 Units, Subcutaneous, Q8H [facility]  •  docusate sodium (COLACE) cap 100 mg, 100 mg, Oral, BID  •  bisacodyl (DULCOLAX) rectal suppository 10 mg, 10 mg, Rectal, Daily PRN  •  AmLODIPine Besylate Abdomen+pelvis(contrast Only)(cpt=74177)    Result Date: 10/6/2018  CONCLUSION:  1. Post right renal partial nephrectomy lower pole renal mass. Post operative seroma and/or urinoma at the surgical site measuring 6.4 x 6.1 x 2.8 cm.  Surrounding perinephric nephrectomy     Dictated by (CST): Timothy Degroot MD on 10/06/2018 at 6:53     Approved by (CST): Timothy Degroot MD on 10/06/2018 at 7:00          Teresa Castro is a a(n) 50year old woman with hx of obesity, diabetes, th

## 2018-10-06 NOTE — PLAN OF CARE
GENITOURINARY - ADULT    • Absence of urinary retention Progressing        Patient Centered Care    • Patient preferences are identified and integrated in the patient's plan of care Progressing        Patient/Family Goals    • Patient/Family Long Term Goal

## 2018-10-07 PROCEDURE — 99232 SBSQ HOSP IP/OBS MODERATE 35: CPT | Performed by: INTERNAL MEDICINE

## 2018-10-07 PROCEDURE — 99225 SUBSEQUENT OBSERVATION CARE: CPT | Performed by: INTERNAL MEDICINE

## 2018-10-07 NOTE — PROGRESS NOTES
[facility]    Progress Note    Darrion Giordano Patient Status:  Inpatient    1970 MRN [id number]   Location [facility] 4W/SW/SE Attending Venu Trujillo MD   [facility] Day # 1 PCP Princess Stevens.  Sonia Montanez MD        Subjective:                Results:     Lab Results   Component Value Date    WBC 13.7 (H) 10/07/2018    HGB 12.2 10/07/2018    HCT 36.4 10/07/2018     10/07/2018    CREATSERUM 1.26 10/07/2018    BUN 9 10/07/2018     10/07/2018    K 4.2 10/07/2018    CL atelectasis left lower lobe. 2. Findings consistent with postop atelectasis.  Otherwise, grossly negative portable chest     Dictated by (CST): Cristina Rdz MD on 10/06/2018 at 6:56     Approved by (CST): Cristina Rzd MD on 10/06/2018 at 6:59

## 2018-10-07 NOTE — PROGRESS NOTES
Fermin Gillette is a 50year old female. HPI:   Patient presents with:  Constipation (gastrointestinal)  Patient feeling much better today. Has no incisional pain. Is no flank or abdominal pain.   Started having a lot of bowel activity with soft and (hip)  No date: ARTHROSCOPY OF JOINT UNLISTED; Right      Comment:  bone spur and torn cartilage shoulder  2014: CARPAL TUNNEL RELEASE; Right  No date: CHOLECYSTECTOMY  No date: CORRECT BUNION,OTHR METHODS; Right  No date: HIP REPLACEMENT SURGERY;  Left  9/ mg, Oral, BID  •  Heparin Sodium (Porcine) 5000 UNIT/ML injection 5,000 Units, 5,000 Units, Subcutaneous, Q8H [facility]  •  AmLODIPine Besylate (NORVASC) tab 2.5 mg, 2.5 mg, Oral, Daily  •  atenolol (TENORMIN) tab 12.5 mg, 12.5 mg, Oral, Nightly  •  Losartan Pot (BP Location: Right arm)   Pulse 67   Temp 97.8 °F (36.6 °C) (Oral)   Resp 15   Ht 5' 6\" (1.676 m)   Wt 255 lb 9.6 oz (115.9 kg)   LMP 09/24/2018   SpO2 93%   BMI 41.25 kg/m²   GENERAL: alert and orientated X 3, well developed, well nourished, in no appar PHURINE  6.0   PROUR  30 *   UROBILINOGEN  <2.0   NITRITE  Negative   LEUUR  Trace*   WBCUR  5   RBCUR  1   BACUR  Few*   EPIUR  Few       No results for input(s): URINE, CULTI, BLDSMR in the last 168 hours.      Imaging:   Ct Chest (cpt=71250)    Result was used. Adjustment of the mA and/or kV was done based on the patient's size. Use of iterative reconstruction technique for dose reduction was used. FINDINGS:  LIVER: Mild generalized lobe hepatic attenuation consistent with hepatic steatosis.  Small pun URINARY BLADDER: No visible focal wall thickening, lesion or calculus. PELVIC NODES: No enlarged mass or adenopathy. PELVIC ORGANS: No visible mass. Pelvic organs appropriate for patient age. BONES:   No significant bony lesion or fracture.  LUNG BASES + LAT CHEST (CPT=71020), 1/02/2018, 15:24. [facility], CT ABDOMEN + PELVIS (CONTRAST ONLY) (CPT=74177), 10/05/2018, 21:56. [facility], XR CHEST AP PORTABLE (CPT=71045), 10/02/2018, 12:41.   INDICATIONS: Cough and congestion bibasilar atelectasis. .  No significant pulmonary parenchymal abnormalities. No effusion or pleural thickening. BONES: No fracture or visible bony lesion. OTHER: Negative. CONCLUSION:  1. Suboptimal inspiration. Linear bibasilar atelectasis.  No larg Newton Crooks MD on 10/06/2018 at 7:00                Review of previous records: reviewed- if available        ASSESSMENT/PLAN:   Assessment     Resolving ileus  Atelectasis resolving  History of renal cell carcinoma lower pole right kidney which is

## 2018-10-07 NOTE — PROGRESS NOTES
K[facility] [address]     Gastroenterology Progress Note    Armando Malika Patient Status:  Inpatient    1970 MRN [id number]   Location [facility] 4W/SW/SE Attending Bobbi Lagunas MD   Hosp Day # 1 PCP Rosemarie Arteaga.  Carroll Moya retroperitoneal fluid consistent with postoperative seroma/urinoma extending medially along the ureter and trace fluid inferiorly towards the iliac fossa. No contrast extravasation or acute hematoma.  2. Mild right renal pelvis dilatation may be from [facility] disorder, hypertension, appendectomy, cholecystectomy and recent right nephrectomy 10/1 for a renal mass with path consistent with renal cell carcinoma who was admitted 10/5 after presenting to the ED with worsening abdominal discomfort and constipation.

## 2018-10-08 VITALS
DIASTOLIC BLOOD PRESSURE: 53 MMHG | TEMPERATURE: 98 F | BODY MASS INDEX: 41.08 KG/M2 | WEIGHT: 255.63 LBS | SYSTOLIC BLOOD PRESSURE: 123 MMHG | HEIGHT: 66 IN | RESPIRATION RATE: 18 BRPM | HEART RATE: 67 BPM | OXYGEN SATURATION: 99 %

## 2018-10-08 PROCEDURE — 99217 OBSERVATION CARE DISCHARGE: CPT | Performed by: INTERNAL MEDICINE

## 2018-10-08 RX ORDER — CEFADROXIL 500 MG/1
500 CAPSULE ORAL 2 TIMES DAILY
Qty: 10 CAPSULE | Refills: 1 | Status: SHIPPED | OUTPATIENT
Start: 2018-10-08 | End: 2018-10-18

## 2018-10-08 NOTE — PLAN OF CARE
GENITOURINARY - ADULT    • Absence of urinary retention Adequate for Discharge        Patient Centered Care    • Patient preferences are identified and integrated in the patient's plan of care Adequate for Discharge        Patient/Family Goals    • Patient

## 2018-10-08 NOTE — PROGRESS NOTES
Yudy Peck is a 50year old female. HPI:   Patient presents with:  Constipation (gastrointestinal)  Patient feeling better today. She has no flank or abdominal pain. Urine output is good. Bowel movements are normalized. No incisional pain. bone spur and torn cartilage shoulder  2014: CARPAL TUNNEL RELEASE; Right  No date: CHOLECYSTECTOMY  No date: CORRECT BUNION,OTHR METHODS; Right  No date: HIP REPLACEMENT SURGERY;  Left  9/21/2016: LAPAROSCOPIC CHOLECYSTECTOMY  10/1/2018: Martha Perry (ENTEREG) capsule 12 mg, 12 mg, Oral, BID  •  Heparin Sodium (Porcine) 5000 UNIT/ML injection 5,000 Units, 5,000 Units, Subcutaneous, Q8H [address]  •  AmLODIPine Besylate (NORVASC) tab 2.5 mg, 2.5 mg, Oral, Daily  •  atenolol (TENORMIN) tab 12.5 mg, 12.5 mg, Or normal, nose normal, mouth normal  NECK: supple, no lymphadenopathy, thyroid wnl, neck normal  RESPIRATORY: no rales, rhonchi, or wheezes B, lungs clear  CARDIO: RRR without murmur, no murmur, no gallop, normal S1 and S2  ABDOMEN: soft, non-tender with no COMPARISON: [facility], CT ABDOMEN + PELVIS (CONTRAST ONLY) (CPT=74177), 8/31/2018, 16:28. INDICATIONS: Staging right renal cancer.   TECHNIQUE: CT images of the chest were obtained without the administration of contrast material.  Automate tear segment right lobe of the liver of doubtful significance, unchanged. Suspect small cyst versus biliary hamartoma. GALLBLADDER: Gallbladder previously removed. BILIARY: No visible dilatation or calcification. SPLEEN: No enlargement or focal lesion.   Bri Lax the right with small right-sided effusion. Trace left basal pleural reaction. Recommend interval followup to exclude developing basilar pneumonitis. Similar preliminary report was provided via [facility] service without significant discrepancy. Julius Khalil nephrectomy. TECHNIQUE:   Single view. FINDINGS:  CARDIAC/VASC: No cardiac silhouette abnormality or cardiomegaly. Unremarkable pulmonary vasculature. MEDIAST/CHANTELLE:   No visible mass or adenopathy. LUNGS/PLEURA: Or inspiratory effort.  Bibasilar atelec (CST): Patti Rendon MD on 10/02/2018 at 13:19     Approved by (CST): Patti Rendon MD on 10/02/2018 at 13:24          Radiology Result Scan    Result Date: 10/8/2018  Ordered by an unspecified provider.     Xr Abdomen Obstructive Series Routine(2 Vw)(cpt constipation resolved with bowel program  Atelectasis resolved  History of right renal cell carcinoma status post laparoscopic partial right lower pole nephrectomy stable  Diabetes stable    Principle Problem:   <principal problem not specified>    Active

## 2018-10-08 NOTE — PLAN OF CARE
Problem: Patient Centered Care  Goal: Patient preferences are identified and integrated in the patient's plan of care  Interventions:  - What would you like us to know as we care for you?  - Provide timely, complete, and accurate information to patient/fam Progressing      Problem: SKIN/TISSUE INTEGRITY - ADULT  Goal: Incision(s), wounds(s) or drain site(s) healing without S/S of infection  INTERVENTIONS:  - Assess and document risk factors for pressure ulcer development  - Assess and document skin integrity

## 2018-10-08 NOTE — PAYOR COMM NOTE
--------------  ADMISSION REVIEW     Payor: Jose Pollard #:  [id number]  Authorization Number: [id number]    Admit date: 10/6/18  Admit time: 5090       Admitting Physician: Geronimo Prasad MD  Attending Physician:  Geronimo Prasad MD Blood pressure 131/50, pulse 67, temperature 97.8 °F (36.6 °C), temperature source Oral, resp.  rate 15, height 5' 6\" (1.676 m), weight 255 lb 9.6 oz (115.9 kg), last menstrual period 09/24/2018, SpO2 93 %, not currently breastfeeding.     Nursing note and   TSH 0.54 10/02/2018     QUITA 51 08/31/2018     LIP 36 08/31/2018         Ct Abdomen+pelvis(contrast Only)(cpt=74177)     Result Date: 10/6/2018  CONCLUSION:  1. Post right renal partial nephrectomy lower pole renal mass.  Post operative seroma and/or urino CONCLUSION:  1. Moderate stool in right hemicolon consistent constipation/fecal retention. 2. Surgical clips in the right upper quadrant and right lower quadrant.  3. Abdominal wall thickening/induration right lateral flank region from recent partial nephre Alcohol/week: 0.0 oz      Comment: occassionally     Drug use: No      Review of Systems    Positive for stated complaint: constipation  Other systems are as noted in HPI. Constitutional and vital signs reviewed.       All other systems reviewed and ne Neutrophil Absolute 16.2 (*)     Monocyte Absolute 1.8 (*)     All other components within normal limits   [facility] POCT PREGNANCY URINE - Normal   CBC WITH DIFFERENTIAL WITH PLATELET    Narrative:      The following orders were created for panel order CBC WITH Admission disposition: 10/6/2018  1:25 AM          Abdomen series 5 views at 10:14 PM  Comparison: CT scan same day    IMPRESSION:    Large amount of fecal material is noted especially in the right colon consistent with constipation.   No evidence for bowel Follow-up:  No follow-up provider specified.       Medications Prescribed:  Current Discharge Medication List        Present on Admission  Date Reviewed: 10/1/2018          ICD-10-CM Noted POA    Leukocytosis D72.829 10/6/2018 Unknown              Signed by Comment:  Labral tear reapir, iliopsoas burscetomy.  (hip)  No date: ARTHROSCOPY OF JOINT UNLISTED; Right      Comment:  bone spur and torn cartilage shoulder  2014: CARPAL TUNNEL RELEASE; Right  No date: CHOLECYSTECTOMY  No date: [facility] Candesartan Cilexetil 8 MG Oral Tab Take 1 tablet (8 mg total) by mouth nightly.    LEVOTHYROXINE SODIUM 100 MCG Oral Tab TAKE 1 TABLET BEFORE BREAKFAST   spironolactone 100 MG Oral Tab 1 po qam and 1/2 tab in PM.   Multiple Vitamins-Minerals (MULTIVITAMIN CREATSERUM 1.10 10/06/2018    BUN 8 10/06/2018     (L) 10/06/2018    K 4.0 10/06/2018     10/06/2018    CO2 26 10/06/2018     (H) 10/06/2018    CA 8.2 (L) 10/06/2018    ALB 3.1 (L) 10/05/2018    ALKPHO 68 10/05/2018    BILT 1.2 10/05/20 CONCLUSION:  1. Poor inspiratory effort with bibasilar atelectasis. Subsegmental atelectasis left lower lobe. 2. Findings consistent with postop atelectasis.  Otherwise, grossly negative portable chest     Dictated by (CST): Saranay Garrido MD on 10/06/201 atenolol (TENORMIN) tab 12.5 mg     Date Action Dose Route User    10/7/2018 2041 Given 12.5 mg Oral Simona Stone, RN      CefTRIAXone Sodium (ROCEPHIN) 2 g in sodium chloride 0.9% 100 mL MBP/ADD-vantage     Date Action Dose Route User    10/7/2018 23 Patient feeling much better today. Has no incisional pain. Is no flank or abdominal pain. Started having a lot of bowel activity with soft and loose stools. She just started solid food this morning and has had no nausea or vomiting.   She says she occas Comment:  bone spur and torn cartilage shoulder  2014: CARPAL TUNNEL RELEASE; Right  No date: CHOLECYSTECTOMY  No date: CORRECT BUNION,OTHR METHODS; Right  No date: HIP REPLACEMENT SURGERY;  Left  9/21/2016: LAPAROSCOPIC CHOLECYSTECTOMY  10/1/2018: Levar Alicea •  Heparin Sodium (Porcine) 5000 UNIT/ML injection 5,000 Units, 5,000 Units, Subcutaneous, Q8H [facility]  •  AmLODIPine Besylate (NORVASC) tab 2.5 mg, 2.5 mg, Oral, Daily  •  atenolol (TENORMIN) tab 12.5 mg, 12.5 mg, Oral, Nightly  •  Losartan Potassium (COZAAR) /50 (BP Location: Right arm)   Pulse 67   Temp 97.8 °F (36.6 °C) (Oral)   Resp 15   Ht 5' 6\" (1.676 m)   Wt 255 lb 9.6 oz (115.9 kg)   LMP 09/24/2018   SpO2 93%   BMI 41.25 kg/m²   GENERAL: alert and orientated X 3, well developed, well nourished, i GLUUR  Negative   BILUR  Negative   KETUR  Trace*   BLOODURINE  Small*   PHURINE  6.0   PROUR  30 *   UROBILINOGEN  <2.0   NITRITE  Negative   LEUUR  Trace*   WBCUR  5   RBCUR  1   BACUR  Few*   EPIUR  Few         No results for input(s): URINE, CULTI, BLD PROCEDURE:   CT ABDOMEN + PELVIS (CONTRAST ONLY) (WFN=83371)  COMPARISON:      [facility], CT ABDOMEN + PELVIS (CONTRAST ONLY) (CPT=74177), 8/31/2018, 16:28.   INDICATIONS:          Status post RT partial nephrectomy right lower pole renal c be due from extrinsic compression from the perinephric urinoma. AORTA/VASCULAR:   Mild calcific atherosclerosis. No aneurysm or dissection. RETROPERITONEUM: No mass or enlarged adenopathy.   BOWEL/MESENTERY:   Moderate stool in right hemicolon cons CONCLUSION:  1. Post right renal partial nephrectomy lower pole renal mass. Post operative seroma and/or urinoma at the surgical site measuring 6.4 x 6.1 x 2.8 cm.  Surrounding perinephric fluid and retroperitoneal fluid consistent with postoperative seroma PROCEDURE:   XR CHEST AP PORTABLE (CPT=71010) TIME: 0108 hrs. COMPARISON:          [facility], [facility], XR CHEST PA + LAT CHEST (CPT=71020), 1/02/2018, 15:24.   [facility], CT ABDOMEN + PELVIS (CONTRAST ONLY) (CPT=74177), 10 PROCEDURE:   XR CHEST AP PORTABLE (CPT=71010) TIME: 12:41. COMPARISON: [facility], MRI MRCP ABDOMEN WO CONTRAST, 8/10/2016, 20:24. [facility], CT ABDOMEN PELVIS WO CON, 8/09/2016, 17:58.   [facility], CT AB PROCEDURE:   XR ABDOMEN OBSTRUCTIVE SERIES ROUTINE (2 VIEWS)(CPT=74019)  COMPARISON:             [facility], CT ABDOMEN + PELVIS (CONTRAST ONLY) (CPT=74177), 8/31/2018, 16:28.   [facility], CT ABDOMEN + PELVIS (CONTRAST ONLY) Patient seems to be doing much better today probably having resolving ileus will continue to monitor her and watch her white count should continue antibiotics of for urine culture and blood cultures are negative thanks  Principle Problem:   <principal prob No acute events. Says she has had about 6 bowel movements in the last 24 hours. Abdominal pain improved.  Denies chest pain or shortness of breath.     Objective:   Blood pressure 131/50, pulse 67, temperature 97.8 °F (36.6 °C), temperature source Oral, res

## 2018-10-08 NOTE — DISCHARGE SUMMARY
[facility]    Discharge Summary    Bayron Benavidez Patient Status:  Inpatient    1970 MRN [id number]   Location [facility] 4W/SW/SE Attending Roman White MD   [facility] Day # 2 PCP Chris Crews.  Brianne Perkins MD     Date of Adm carotid    bruit or JVD   Back:     Symmetric, no curvature, ROM normal, no CVA tenderness   Lungs:     Clear to auscultation bilaterally, respirations unlabored   Chest Wall:    No tenderness or deformity    Heart:    Regular rate and rhythm, S1 and S2 no times daily for 10 days.    Stop taking on:  10/18/2018  Quantity:  10 capsule  Refills:  1        ASK your doctor about these medications      Instructions Prescription details   Acetaminophen-Codeine #3 300-30 MG Tabs  Commonly known as:  TYLENOL #3 to Get Your Medications      Please  your prescriptions at the location directed by your doctor or nurse    Bring a paper prescription for each of these medications  · Cefadroxil 500 MG Caps         Follow up Visits:  Follow-up with Dr. Nina Jacobson in 2

## 2018-10-09 NOTE — DISCHARGE SUMMARY
DISCHARGE SUMMARY     Evelin Deluna Patient Status:  Inpatient    1970 MRN [id number]   Location [facility] 4W/SW/SE Attending Rosita Herron MD   [facility] Day # 2 PCP Ghanshyam Walker.  Arnel Rick MD     Date of Admission: 10/5/2018  Date of Disc descriptions): •     Lab/Test results pending at Discharge:   ·     Consultants:  • GI, Urology.      Discharge Medication List:     Discharge Medications      START taking these medications      Instructions Prescription details   Cefadroxil 500 MG Caps 32 tablet  Refills:  0     triamcinolone acetonide 0.1 % Crea  Commonly known as:  KENALOG      Use to AA's BID for no longer than 2 weeks   Refills:  0     Zolpidem Tartrate 5 MG Tabs  Commonly known as:  AMBIEN      Take 1 tablet (5 mg total) by mouth ni tolerated    Follow-up appointment:   Makayla Holley MD  [facility] [address] [facility] [phone]    In 2 weeks        Vital signs:  Temp:  [98.2 °F (36.8 °C)-98.3 °F (36.8 °C)] 98.2 °F (36.8 °C)  Pulse:  [67-68] 67  Resp:  [16-18]

## 2018-10-11 ENCOUNTER — APPOINTMENT (OUTPATIENT)
Dept: LAB | Facility: HOSPITAL | Age: 48
End: 2018-10-11
Attending: INTERNAL MEDICINE
Payer: COMMERCIAL

## 2018-10-11 ENCOUNTER — TELEPHONE (OUTPATIENT)
Dept: INTERNAL MEDICINE CLINIC | Facility: CLINIC | Age: 48
End: 2018-10-11

## 2018-10-11 ENCOUNTER — OFFICE VISIT (OUTPATIENT)
Dept: INTERNAL MEDICINE CLINIC | Facility: CLINIC | Age: 48
End: 2018-10-11
Payer: COMMERCIAL

## 2018-10-11 ENCOUNTER — OFFICE VISIT (OUTPATIENT)
Dept: HEMATOLOGY/ONCOLOGY | Facility: HOSPITAL | Age: 48
End: 2018-10-11
Attending: INTERNAL MEDICINE
Payer: COMMERCIAL

## 2018-10-11 VITALS
BODY MASS INDEX: 38.03 KG/M2 | DIASTOLIC BLOOD PRESSURE: 71 MMHG | RESPIRATION RATE: 18 BRPM | SYSTOLIC BLOOD PRESSURE: 126 MMHG | HEART RATE: 68 BPM | TEMPERATURE: 98 F | HEIGHT: 66 IN | WEIGHT: 236.63 LBS

## 2018-10-11 VITALS
BODY MASS INDEX: 38.09 KG/M2 | DIASTOLIC BLOOD PRESSURE: 73 MMHG | HEIGHT: 66 IN | SYSTOLIC BLOOD PRESSURE: 111 MMHG | TEMPERATURE: 98 F | WEIGHT: 237 LBS | HEART RATE: 62 BPM

## 2018-10-11 DIAGNOSIS — I10 ESSENTIAL HYPERTENSION: ICD-10-CM

## 2018-10-11 DIAGNOSIS — R19.7 DIARRHEA, UNSPECIFIED TYPE: Primary | ICD-10-CM

## 2018-10-11 DIAGNOSIS — N15.9 KIDNEY INFECTION: ICD-10-CM

## 2018-10-11 DIAGNOSIS — R19.7 DIARRHEA, UNSPECIFIED TYPE: ICD-10-CM

## 2018-10-11 DIAGNOSIS — D72.829 LEUKOCYTOSIS, UNSPECIFIED TYPE: ICD-10-CM

## 2018-10-11 DIAGNOSIS — C64.1 RENAL CELL CARCINOMA OF RIGHT KIDNEY (HCC): Primary | ICD-10-CM

## 2018-10-11 DIAGNOSIS — IMO0001 UNCONTROLLED TYPE 2 DIABETES MELLITUS WITHOUT COMPLICATION, WITHOUT LONG-TERM CURRENT USE OF INSULIN: ICD-10-CM

## 2018-10-11 PROCEDURE — 87493 C DIFF AMPLIFIED PROBE: CPT

## 2018-10-11 PROCEDURE — 36415 COLL VENOUS BLD VENIPUNCTURE: CPT | Performed by: INTERNAL MEDICINE

## 2018-10-11 PROCEDURE — 90471 IMMUNIZATION ADMIN: CPT | Performed by: INTERNAL MEDICINE

## 2018-10-11 PROCEDURE — 99495 TRANSJ CARE MGMT MOD F2F 14D: CPT | Performed by: INTERNAL MEDICINE

## 2018-10-11 PROCEDURE — 99215 OFFICE O/P EST HI 40 MIN: CPT | Performed by: INTERNAL MEDICINE

## 2018-10-11 PROCEDURE — 90686 IIV4 VACC NO PRSV 0.5 ML IM: CPT | Performed by: INTERNAL MEDICINE

## 2018-10-11 RX ORDER — TIZANIDINE HYDROCHLORIDE 4 MG/1
4 CAPSULE, GELATIN COATED ORAL NIGHTLY PRN
Qty: 20 CAPSULE | Refills: 1 | Status: ON HOLD | OUTPATIENT
Start: 2018-10-11 | End: 2019-01-10

## 2018-10-11 RX ORDER — ATENOLOL 25 MG/1
12.5 TABLET ORAL NIGHTLY
Qty: 30 TABLET | Refills: 1 | Status: SHIPPED | OUTPATIENT
Start: 2018-10-11 | End: 2018-11-13

## 2018-10-11 RX ORDER — AMLODIPINE BESYLATE 2.5 MG/1
2.5 TABLET ORAL DAILY
Qty: 30 TABLET | Refills: 1 | Status: SHIPPED | OUTPATIENT
Start: 2018-10-11 | End: 2018-11-13

## 2018-10-11 RX ORDER — ZOLPIDEM TARTRATE 5 MG/1
5 TABLET ORAL NIGHTLY PRN
Qty: 10 TABLET | Refills: 0 | Status: SHIPPED | OUTPATIENT
Start: 2018-10-11 | End: 2020-06-09

## 2018-10-11 NOTE — PATIENT INSTRUCTIONS
ASSESSMENT/ PLAN:   Diagnoses and all orders for this visit:    Diarrhea, unspecified type  -     C. DIFFICILE(TOXIGENIC)PCR; Future  Use immodium as needed. But careful not too much. Wash hands thoroughly. BRAT (Bananas, rice apples, tea, bread, etc).  Domenico Encounter      Flulaval 6 months and older 0.5 ml Quad PF [35936]      Clostridium difficile(toxigenic)PCR [E]      Meds & Refills for this Visit:  Requested Prescriptions     Signed Prescriptions Disp Refills   • AmLODIPine Besylate 2.5 MG Oral Tab 30 tab

## 2018-10-11 NOTE — PROGRESS NOTES
Cancer Center Progress Note    Patient Name: Pratibha Knox   YOB: 1970   Medical Record Number: [id number]   Attending Physician: Jr Ortiz M.D.      Chief Complaint:  Renal cell carcinoma of the right kidney clear cell    History of Father    • Hypertension Father    • Heart Disease Father 76        aortic valve disease   • Cancer Father         Skin cancer.     • Arthritis Mother    • Cancer Mother         lung, skin and MDS   • Hypertension Mother    • Heart Disorder Mother 61 days., Disp: 10 capsule, Rfl: 1  •  Acetaminophen-Codeine #3 300-30 MG Oral Tab, Take 1-2 tablets by mouth every 4 (four) hours as needed for Pain., Disp: 24 tablet, Rfl: 0  •  Clindamycin Phosphate 1 % External Foam, apply to affected areas on the back, a Extremities: No edema. Neurological: 5/5 motor x4.         Laboratory:  Recent Labs   Lab  10/08/18   0540   WBC  8.7   HGB  11.3*   PLT  286   NE  5.8         Lab Results   Component Value Date     (H) 10/08/2018    BUN 10 10/08/2018    BUNCREA 8

## 2018-10-11 NOTE — PROGRESS NOTES
HPI:    Patient ID: Bertram Strauss is a 50year old female. HPI   Oncologist this afternoon. Sees Dr. Karen Garcia renal.     Here for F/U hospital.     Diabetes  Patient here for follow up of Diabetes. Has been taking medications regularly.     Checks s K 3.6 10/08/2018 05:40 AM     10/08/2018 05:40 AM    CO2 23 10/08/2018 05:40 AM    CREATSERUM 1.14 10/08/2018 05:40 AM    CA 8.3 (L) 10/08/2018 05:40 AM    AST 26 10/05/2018 09:21 PM    ALT 37 10/05/2018 09:21 PM    TSH 0.54 10/02/2018 05:27 AM atenolol 25 MG Oral Tab Take 0.5 tablets (12.5 mg total) by mouth nightly. Disp: 30 tablet Rfl: 1   Zolpidem Tartrate (AMBIEN) 5 MG Oral Tab Take 1 tablet (5 mg total) by mouth nightly as needed for Sleep.  Disp: 10 tablet Rfl: 0   TiZANidine HCl 4 MG Oral Procedure Laterality Date   • APPENDECTOMY  2008   • APPENDECTOMY     • ARTHROSCOPY OF JOINT UNLISTED Left 1-4-16    Labral tear reapir, iliopsoas burscetomy.  (hip)   • ARTHROSCOPY OF JOINT UNLISTED Right     bone spur and torn cartilage shoulder   • CARPA Carotid pulses are 2+ on the right side, and 2+ on the left side. Radial pulses are 2+ on the right side, and 2+ on the left side. Dorsalis pedis pulses are 2+ on the right side, and 2+ on the left side.         Posterior tibial pulses are • TiZANidine HCl 4 MG Oral Cap 20 capsule 1     Sig: Take 1 capsule (4 mg total) by mouth nightly as needed for Muscle spasms.        Imaging & Referrals:  FLULAVAL INFLUENZA VACCINE QUAD PRESERVATIVE FREE 0.5 ML        EI#[id number]    HPI:    Raquel Temple spironolactone 100 MG Oral Tab 1 po qam and 1/2 tab in PM.   Multiple Vitamins-Minerals (MULTIVITAMIN OR) Take by mouth. Linagliptin (TRADJENTA) 5 MG Oral Tab Take 1 tablet by mouth  Q12hrs.    Olopatadine HCl 0.1 % Ophthalmic Solution Place 1 drop into b Constitutional: Positive for fatigue. Negative for activity change, appetite change, chills, diaphoresis, fever and unexpected weight change. HENT: Negative for drooling, sore throat, trouble swallowing and voice change.     Respiratory: Negative for apne Mouth/Throat: Uvula is midline, oropharynx is clear and moist and mucous membranes are normal. No oropharyngeal exudate. Eyes: Conjunctivae are normal. No scleral icterus.    Neck: Trachea normal and phonation normal. No thyroid mass and no thyromegaly pr Uncontrolled type 2 diabetes mellitus without complication, without long-term current use of insulin (HCC) Slightly higher. Careful with diet and excercise at least 30 minutes 3-4 times a week. Check sugars at different times on different dates.  Careful wi Sig: Take 1 tablet (5 mg total) by mouth nightly as needed for Sleep. • TiZANidine HCl 4 MG Oral Cap 20 capsule 1     Sig: Take 1 capsule (4 mg total) by mouth nightly as needed for Muscle spasms.        Imaging & Consults:  FLULAVAL INFLUENZA VACCINE Q

## 2018-10-11 NOTE — TELEPHONE ENCOUNTER
Return in about 6 weeks (around 11/22/2018), or if symptoms worsen or fail to improve.   PT WANTS A Tuesday AFTER 4PM- LE[facility]ISE

## 2018-10-12 RX ORDER — SPIRONOLACTONE 100 MG/1
TABLET, FILM COATED ORAL
Qty: 135 TABLET | Refills: 1 | Status: SHIPPED | OUTPATIENT
Start: 2018-10-12 | End: 2019-04-10

## 2018-10-12 RX ORDER — SPIRONOLACTONE 100 MG/1
TABLET, FILM COATED ORAL
Qty: 135 TABLET | Refills: 1 | Status: SHIPPED | OUTPATIENT
Start: 2018-10-12 | End: 2018-10-12

## 2018-10-14 PROBLEM — Z01.818 PRE-OP TESTING: Status: RESOLVED | Noted: 2018-10-01 | Resolved: 2018-10-14

## 2018-10-14 PROBLEM — D72.829 LEUKOCYTOSIS: Status: RESOLVED | Noted: 2018-10-06 | Resolved: 2018-10-14

## 2018-10-14 PROBLEM — N15.9 KIDNEY INFECTION: Status: RESOLVED | Noted: 2018-10-06 | Resolved: 2018-10-14

## 2018-10-26 RX ORDER — CANDESARTAN 8 MG/1
TABLET ORAL
Qty: 30 TABLET | Refills: 3 | Status: SHIPPED | OUTPATIENT
Start: 2018-10-26 | End: 2018-11-13

## 2018-10-27 ENCOUNTER — APPOINTMENT (OUTPATIENT)
Dept: LAB | Facility: HOSPITAL | Age: 48
End: 2018-10-27
Attending: INTERNAL MEDICINE
Payer: COMMERCIAL

## 2018-10-27 DIAGNOSIS — N28.89 RIGHT RENAL MASS: ICD-10-CM

## 2018-10-27 PROCEDURE — 80048 BASIC METABOLIC PNL TOTAL CA: CPT

## 2018-10-27 PROCEDURE — 36415 COLL VENOUS BLD VENIPUNCTURE: CPT

## 2018-10-29 ENCOUNTER — OFFICE VISIT (OUTPATIENT)
Dept: NEPHROLOGY | Facility: CLINIC | Age: 48
End: 2018-10-29
Payer: COMMERCIAL

## 2018-10-29 VITALS
DIASTOLIC BLOOD PRESSURE: 81 MMHG | HEART RATE: 69 BPM | SYSTOLIC BLOOD PRESSURE: 138 MMHG | WEIGHT: 242 LBS | BODY MASS INDEX: 38.89 KG/M2 | HEIGHT: 66 IN

## 2018-10-29 DIAGNOSIS — N17.9 AKI (ACUTE KIDNEY INJURY) (HCC): Primary | ICD-10-CM

## 2018-10-29 DIAGNOSIS — C64.1 RENAL CELL ADENOCARCINOMA, RIGHT (HCC): ICD-10-CM

## 2018-10-29 DIAGNOSIS — I10 ESSENTIAL HYPERTENSION: ICD-10-CM

## 2018-10-29 DIAGNOSIS — E11.9 TYPE 2 DIABETES MELLITUS WITHOUT COMPLICATION, WITHOUT LONG-TERM CURRENT USE OF INSULIN (HCC): ICD-10-CM

## 2018-10-29 PROCEDURE — 99213 OFFICE O/P EST LOW 20 MIN: CPT | Performed by: INTERNAL MEDICINE

## 2018-10-29 PROCEDURE — 1111F DSCHRG MED/CURRENT MED MERGE: CPT | Performed by: INTERNAL MEDICINE

## 2018-10-29 PROCEDURE — 99212 OFFICE O/P EST SF 10 MIN: CPT | Performed by: INTERNAL MEDICINE

## 2018-11-01 NOTE — PROGRESS NOTES
Dear Macey Haney,  I saw Jenny follow-up on October 29 she was just in the hospital again for a seroma around her partial right nephrectomy. About a month ago she had a partial right nephrectomy for renal cell carcinoma.   She took some antibiotics was in the h

## 2018-11-13 ENCOUNTER — TELEPHONE (OUTPATIENT)
Dept: INTERNAL MEDICINE CLINIC | Facility: CLINIC | Age: 48
End: 2018-11-13

## 2018-11-13 ENCOUNTER — OFFICE VISIT (OUTPATIENT)
Dept: INTERNAL MEDICINE CLINIC | Facility: CLINIC | Age: 48
End: 2018-11-13
Payer: COMMERCIAL

## 2018-11-13 VITALS
DIASTOLIC BLOOD PRESSURE: 94 MMHG | HEIGHT: 66 IN | HEART RATE: 70 BPM | WEIGHT: 246.38 LBS | TEMPERATURE: 98 F | BODY MASS INDEX: 39.6 KG/M2 | SYSTOLIC BLOOD PRESSURE: 155 MMHG | RESPIRATION RATE: 16 BRPM | OXYGEN SATURATION: 98 %

## 2018-11-13 DIAGNOSIS — E03.9 ACQUIRED HYPOTHYROIDISM: ICD-10-CM

## 2018-11-13 DIAGNOSIS — N28.89 RENAL MASS, RIGHT: ICD-10-CM

## 2018-11-13 DIAGNOSIS — IMO0001 UNCONTROLLED TYPE 2 DIABETES MELLITUS WITHOUT COMPLICATION, WITHOUT LONG-TERM CURRENT USE OF INSULIN: Primary | ICD-10-CM

## 2018-11-13 DIAGNOSIS — R14.2 BELCHING: ICD-10-CM

## 2018-11-13 DIAGNOSIS — I10 ESSENTIAL HYPERTENSION: ICD-10-CM

## 2018-11-13 DIAGNOSIS — E55.9 VITAMIN D DEFICIENCY: ICD-10-CM

## 2018-11-13 PROCEDURE — 99214 OFFICE O/P EST MOD 30 MIN: CPT | Performed by: INTERNAL MEDICINE

## 2018-11-13 RX ORDER — ATENOLOL 25 MG/1
12.5 TABLET ORAL NIGHTLY
Qty: 90 TABLET | Refills: 1 | Status: SHIPPED | OUTPATIENT
Start: 2018-11-13 | End: 2018-12-03

## 2018-11-13 RX ORDER — LEVOTHYROXINE SODIUM 0.1 MG/1
TABLET ORAL
Qty: 90 TABLET | Refills: 1 | Status: SHIPPED | OUTPATIENT
Start: 2018-11-13 | End: 2019-05-01

## 2018-11-13 RX ORDER — FAMOTIDINE 10 MG
10 TABLET ORAL 2 TIMES DAILY
Qty: 60 TABLET | Refills: 1 | Status: SHIPPED | OUTPATIENT
Start: 2018-11-13

## 2018-11-13 RX ORDER — AMLODIPINE BESYLATE 2.5 MG/1
2.5 TABLET ORAL 2 TIMES DAILY
Qty: 60 TABLET | Refills: 1 | Status: SHIPPED | OUTPATIENT
Start: 2018-11-13 | End: 2019-01-04

## 2018-11-13 RX ORDER — CANDESARTAN 8 MG/1
TABLET ORAL
Qty: 90 TABLET | Refills: 1 | Status: SHIPPED | OUTPATIENT
Start: 2018-11-13 | End: 2019-05-15

## 2018-11-14 PROBLEM — R14.2 BELCHING: Status: RESOLVED | Noted: 2018-11-13 | Resolved: 2018-11-14

## 2018-11-14 PROBLEM — D72.829 LEUKOCYTOSIS, UNSPECIFIED TYPE: Status: RESOLVED | Noted: 2018-10-06 | Resolved: 2018-11-14

## 2018-11-14 NOTE — TELEPHONE ENCOUNTER
Patient was told appointment December 7th at 3pm patient can not come at that time morning is good or afternoon on December 6th. Please advise . Detail Level: Simple

## 2018-11-14 NOTE — PROGRESS NOTES
HPI:    Patient ID: Juan Valero is a 50year old female. HPI   Hypertension  Patient is here for follow up of hypertension. BP at home: 140-150/90's. Has been compliant with medications.   Exercise level: not active and has been following low sa 12:47 PM    TSH 0.54 10/02/2018 05:27 AM        Lab Results   Component Value Date/Time    CHOLEST 181 02/26/2018 07:05 AM    HDL 45 02/26/2018 07:05 AM    TRIG 180 (H) 02/26/2018 07:05 AM     (H) 02/26/2018 07:05 AM    NONHDLC 136 (H) 02/26/2018 07 sleep disturbance and suicidal ideas. The patient is not nervous/anxious and is not hyperactive. All other systems reviewed and are negative.           Current Outpatient Medications:  famotidine 10 MG Oral Tab Take 1 tablet (10 mg total) by mouth 2 (two Hypothyroidism     Always underactive.     • PONV (postoperative nausea and vomiting)     difficulty arousing       Past Surgical History:   Procedure Laterality Date   • APPENDECTOMY  2008   • APPENDECTOMY     • ARTHROSCOPY OF JOINT UNLISTED Left 1-4-16 and not bulging. Tympanic membrane mobility is normal. No middle ear effusion. No hemotympanum. No decreased hearing is noted. Left Ear: Tympanic membrane, external ear and ear canal normal. No lacerations. No drainage, swelling or tenderness.  No foreign usage. No apnea, no tachypnea and no bradypnea. No respiratory distress. She has no decreased breath sounds. She has no wheezes. She has no rhonchi. She has no rales. She exhibits no tenderness. Musculoskeletal: She exhibits no edema.         Right should and affect. Her behavior is normal. Thought content normal.   Nursing note and vitals reviewed.            ASSESSMENT/PLAN:   Uncontrolled type 2 diabetes mellitus without complication, without long-term current use of insulin (hcc)  (primary encounter diag & Referrals:  None         #[id number]

## 2018-11-14 NOTE — PATIENT INSTRUCTIONS
ASSESSMENT/PLAN:   Uncontrolled type 2 diabetes mellitus without complication, without long-term current use of insulin (hcc)  (primary encounter diagnosis) More hyglycemia. Hold med. Changes.  Careful with diet and excercise at least 30 minutes 3-4

## 2018-11-14 NOTE — TELEPHONE ENCOUNTER
Patient was scheduled for 6:15pm with Dr. Jim López approval, however when we schduled the patient and the defaults on the schedule we could only book the patient at the end of the day and add a note of the scheduled time.   When patient is contacted by the au

## 2018-11-14 NOTE — TELEPHONE ENCOUNTER
Pt. Colton Mckeon in at [facility] for 8 PM ashleigh't in [place] on 11-13-18. Pt. States she was told that her ashleigh't was at 8 PM?????? Can we figure out what happened.

## 2018-11-16 ENCOUNTER — CHARTING TRANS (OUTPATIENT)
Dept: OTHER | Age: 48
End: 2018-11-16

## 2018-12-03 ENCOUNTER — TELEPHONE (OUTPATIENT)
Dept: INTERNAL MEDICINE CLINIC | Facility: CLINIC | Age: 48
End: 2018-12-03

## 2018-12-03 ENCOUNTER — TELEPHONE (OUTPATIENT)
Dept: NEPHROLOGY | Facility: CLINIC | Age: 48
End: 2018-12-03

## 2018-12-03 ENCOUNTER — OFFICE VISIT (OUTPATIENT)
Dept: INTERNAL MEDICINE CLINIC | Facility: CLINIC | Age: 48
End: 2018-12-03
Payer: COMMERCIAL

## 2018-12-03 VITALS
HEIGHT: 66 IN | OXYGEN SATURATION: 96 % | HEART RATE: 88 BPM | TEMPERATURE: 99 F | WEIGHT: 245 LBS | BODY MASS INDEX: 39.37 KG/M2 | SYSTOLIC BLOOD PRESSURE: 125 MMHG | DIASTOLIC BLOOD PRESSURE: 88 MMHG

## 2018-12-03 DIAGNOSIS — I10 ESSENTIAL HYPERTENSION: ICD-10-CM

## 2018-12-03 DIAGNOSIS — IMO0001 UNCONTROLLED TYPE 2 DIABETES MELLITUS WITHOUT COMPLICATION, WITHOUT LONG-TERM CURRENT USE OF INSULIN: Primary | ICD-10-CM

## 2018-12-03 PROCEDURE — 99214 OFFICE O/P EST MOD 30 MIN: CPT | Performed by: INTERNAL MEDICINE

## 2018-12-03 RX ORDER — ATENOLOL 25 MG/1
25 TABLET ORAL NIGHTLY
Qty: 90 TABLET | Refills: 1 | Status: SHIPPED | OUTPATIENT
Start: 2018-12-03 | End: 2019-04-09

## 2018-12-03 RX ORDER — CIPROFLOXACIN 500 MG/1
TABLET, FILM COATED ORAL
Refills: 0 | Status: ON HOLD | COMMUNITY
Start: 2018-11-29 | End: 2019-01-10 | Stop reason: ALTCHOICE

## 2018-12-03 RX ORDER — MONTELUKAST SODIUM 10 MG/1
10 TABLET ORAL NIGHTLY
Qty: 10 TABLET | Refills: 0 | Status: SHIPPED | OUTPATIENT
Start: 2018-12-03 | End: 2019-01-15 | Stop reason: ALTCHOICE

## 2018-12-03 NOTE — PROGRESS NOTES
HPI:    Patient ID: Alex Fernandez is a 50year old female. HPI   Hypertension  Patient is here for follow up of hypertension. BP at home: 120-160's/85-90's. PM\"s are higher. Has been compliant with medications.   Exercise level: trying to do more TSH 0.54 10/02/2018 05:27 AM        Lab Results   Component Value Date/Time    CHOLEST 181 02/26/2018 07:05 AM    HDL 45 02/26/2018 07:05 AM    TRIG 180 (H) 02/26/2018 07:05 AM     (H) 02/26/2018 07:05 AM    NONHDLC 136 (H) 02/26/2018 07:05 AM Montelukast Sodium (SINGULAIR) 10 MG Oral Tab Take 1 tablet (10 mg total) by mouth nightly. Disp: 10 tablet Rfl: 0   atenolol 25 MG Oral Tab Take 1 tablet (25 mg total) by mouth nightly.  Disp: 90 tablet Rfl: 1   famotidine 10 MG Oral Tab Take 1 tablet (1 vomiting)     difficulty arousing       Past Surgical History:   Procedure Laterality Date   • APPENDECTOMY  2008   • APPENDECTOMY     • ARTHROSCOPY OF JOINT UNLISTED Left 1-4-16    Labral tear reapir, iliopsoas burscetomy.  (hip)   • ARTHROSCOPY OF JOINT U effusion. No hemotympanum. No decreased hearing is noted. Left Ear: Tympanic membrane, external ear and ear canal normal. No lacerations. No drainage, swelling or tenderness. No foreign bodies. No mastoid tenderness.  Tympanic membrane is not injected, no She exhibits no tenderness. Musculoskeletal: She exhibits no edema. Lymphadenopathy:        Head (right side): No submental, no submandibular, no tonsillar, no preauricular, no posterior auricular and no occipital adenopathy present.         Head (left not, check at local pharmacy. Bake foods more and grill occasionally. Avoid fried foods. No salt. Use other seasonings. Cough. ? RAD. Try singuilar 01 mg at night. Discussed with patient of side effects and use of these medications.        To fax results

## 2018-12-03 NOTE — PATIENT INSTRUCTIONS
ASSESSMENT/PLAN:   Uncontrolled type 2 diabetes mellitus without complication, without long-term current use of insulin (hcc)  (primary encounter diagnosis) ? Control.  Will discuss with renal MD. Careful with diet and excercise at least 30 minutes 3-4 time

## 2018-12-08 VITALS
BODY MASS INDEX: 38.57 KG/M2 | HEART RATE: 92 BPM | TEMPERATURE: 99 F | HEIGHT: 66 IN | WEIGHT: 240 LBS | RESPIRATION RATE: 16 BRPM

## 2018-12-16 ENCOUNTER — LAB ENCOUNTER (OUTPATIENT)
Dept: LAB | Facility: HOSPITAL | Age: 48
End: 2018-12-16
Attending: INTERNAL MEDICINE
Payer: COMMERCIAL

## 2018-12-16 DIAGNOSIS — I10 ESSENTIAL HYPERTENSION: ICD-10-CM

## 2018-12-16 DIAGNOSIS — C64.1 RENAL CELL ADENOCARCINOMA, RIGHT (HCC): ICD-10-CM

## 2018-12-16 DIAGNOSIS — E11.9 TYPE 2 DIABETES MELLITUS WITHOUT COMPLICATION, WITHOUT LONG-TERM CURRENT USE OF INSULIN (HCC): ICD-10-CM

## 2018-12-16 DIAGNOSIS — N17.9 AKI (ACUTE KIDNEY INJURY) (HCC): ICD-10-CM

## 2018-12-16 PROCEDURE — 80069 RENAL FUNCTION PANEL: CPT

## 2018-12-16 PROCEDURE — 85025 COMPLETE CBC W/AUTO DIFF WBC: CPT

## 2018-12-16 PROCEDURE — 36415 COLL VENOUS BLD VENIPUNCTURE: CPT

## 2018-12-19 ENCOUNTER — PATIENT MESSAGE (OUTPATIENT)
Dept: NEPHROLOGY | Facility: CLINIC | Age: 48
End: 2018-12-19

## 2018-12-19 ENCOUNTER — TELEPHONE (OUTPATIENT)
Dept: NEPHROLOGY | Facility: CLINIC | Age: 48
End: 2018-12-19

## 2018-12-19 NOTE — TELEPHONE ENCOUNTER
Pt completed blood work, pt was advised to call once completed, pls call once results are in at:[phone],thanks.

## 2018-12-20 NOTE — TELEPHONE ENCOUNTER
From: Nicholas Alvarez  To: Nathaly Hernandez MD  Sent: 12/19/2018 7:22 PM CST  Subject: Test Results Question    Heljennifer Nicole-    When I saw you Oct 29th you put in blood work orders for me to complete around Dec 15th and then i was to call you.  I lef

## 2019-01-04 RX ORDER — AMLODIPINE BESYLATE 2.5 MG/1
2.5 TABLET ORAL 2 TIMES DAILY
Qty: 60 TABLET | Refills: 1 | Status: SHIPPED | OUTPATIENT
Start: 2019-01-04 | End: 2019-01-15

## 2019-01-09 ENCOUNTER — HOSPITAL ENCOUNTER (OUTPATIENT)
Dept: CT IMAGING | Facility: HOSPITAL | Age: 49
Discharge: HOME | End: 2019-01-09
Attending: UROLOGY
Payer: COMMERCIAL

## 2019-01-09 DIAGNOSIS — C64.1 RENAL CELL CARCINOMA OF RIGHT KIDNEY (HCC): ICD-10-CM

## 2019-01-09 LAB — CREAT BLD-MCNC: 1.2 MG/DL (ref 0.5–1.5)

## 2019-01-09 PROCEDURE — 82565 ASSAY OF CREATININE: CPT

## 2019-01-09 PROCEDURE — 74177 CT ABD & PELVIS W/CONTRAST: CPT | Performed by: UROLOGY

## 2019-01-10 ENCOUNTER — ANESTHESIA EVENT (OUTPATIENT)
Dept: SURGERY | Facility: HOSPITAL | Age: 49
End: 2019-01-10
Payer: COMMERCIAL

## 2019-01-10 ENCOUNTER — APPOINTMENT (OUTPATIENT)
Dept: GENERAL RADIOLOGY | Facility: HOSPITAL | Age: 49
End: 2019-01-10
Attending: UROLOGY
Payer: COMMERCIAL

## 2019-01-10 ENCOUNTER — ANESTHESIA (OUTPATIENT)
Dept: SURGERY | Facility: HOSPITAL | Age: 49
End: 2019-01-10
Payer: COMMERCIAL

## 2019-01-10 ENCOUNTER — HOSPITAL ENCOUNTER (OUTPATIENT)
Facility: HOSPITAL | Age: 49
Setting detail: HOSPITAL OUTPATIENT SURGERY
Discharge: HOME | End: 2019-01-10
Attending: UROLOGY | Admitting: UROLOGY
Payer: COMMERCIAL

## 2019-01-10 VITALS
HEART RATE: 65 BPM | RESPIRATION RATE: 16 BRPM | BODY MASS INDEX: 38.73 KG/M2 | WEIGHT: 241 LBS | HEIGHT: 66 IN | DIASTOLIC BLOOD PRESSURE: 64 MMHG | OXYGEN SATURATION: 95 % | SYSTOLIC BLOOD PRESSURE: 120 MMHG | TEMPERATURE: 97 F

## 2019-01-10 PROCEDURE — 82962 GLUCOSE BLOOD TEST: CPT

## 2019-01-10 PROCEDURE — 80053 COMPREHEN METABOLIC PANEL: CPT | Performed by: UROLOGY

## 2019-01-10 PROCEDURE — 85025 COMPLETE CBC W/AUTO DIFF WBC: CPT | Performed by: UROLOGY

## 2019-01-10 PROCEDURE — 0T9680Z DRAINAGE OF RIGHT URETER WITH DRAINAGE DEVICE, VIA NATURAL OR ARTIFICIAL OPENING ENDOSCOPIC: ICD-10-PCS | Performed by: UROLOGY

## 2019-01-10 PROCEDURE — BT1D1ZZ FLUOROSCOPY OF RIGHT KIDNEY, URETER AND BLADDER USING LOW OSMOLAR CONTRAST: ICD-10-PCS | Performed by: UROLOGY

## 2019-01-10 DEVICE — STENT URET 6F 28CM ULSMTH: Type: IMPLANTABLE DEVICE | Site: URETER | Status: FUNCTIONAL

## 2019-01-10 RX ORDER — ONDANSETRON 2 MG/ML
INJECTION INTRAMUSCULAR; INTRAVENOUS AS NEEDED
Status: DISCONTINUED | OUTPATIENT
Start: 2019-01-10 | End: 2019-01-10 | Stop reason: SURG

## 2019-01-10 RX ORDER — NALOXONE HYDROCHLORIDE 0.4 MG/ML
80 INJECTION, SOLUTION INTRAMUSCULAR; INTRAVENOUS; SUBCUTANEOUS AS NEEDED
Status: DISCONTINUED | OUTPATIENT
Start: 2019-01-10 | End: 2019-01-10

## 2019-01-10 RX ORDER — DEXAMETHASONE SODIUM PHOSPHATE 4 MG/ML
VIAL (ML) INJECTION AS NEEDED
Status: DISCONTINUED | OUTPATIENT
Start: 2019-01-10 | End: 2019-01-10 | Stop reason: SURG

## 2019-01-10 RX ORDER — SCOLOPAMINE TRANSDERMAL SYSTEM 1 MG/1
1 PATCH, EXTENDED RELEASE TRANSDERMAL
Status: DISCONTINUED | OUTPATIENT
Start: 2019-01-10 | End: 2019-01-13 | Stop reason: HOSPADM

## 2019-01-10 RX ORDER — GLYCOPYRROLATE 0.2 MG/ML
INJECTION, SOLUTION INTRAMUSCULAR; INTRAVENOUS AS NEEDED
Status: DISCONTINUED | OUTPATIENT
Start: 2019-01-10 | End: 2019-01-10 | Stop reason: SURG

## 2019-01-10 RX ORDER — DEXTROSE MONOHYDRATE 25 G/50ML
50 INJECTION, SOLUTION INTRAVENOUS
Status: DISCONTINUED | OUTPATIENT
Start: 2019-01-10 | End: 2019-01-10

## 2019-01-10 RX ORDER — MIDAZOLAM HYDROCHLORIDE 1 MG/ML
INJECTION INTRAMUSCULAR; INTRAVENOUS AS NEEDED
Status: DISCONTINUED | OUTPATIENT
Start: 2019-01-10 | End: 2019-01-10 | Stop reason: SURG

## 2019-01-10 RX ORDER — MORPHINE SULFATE 4 MG/ML
4 INJECTION, SOLUTION INTRAMUSCULAR; INTRAVENOUS EVERY 10 MIN PRN
Status: DISCONTINUED | OUTPATIENT
Start: 2019-01-10 | End: 2019-01-10

## 2019-01-10 RX ORDER — ACETAMINOPHEN 500 MG
1000 TABLET ORAL ONCE
Status: COMPLETED | OUTPATIENT
Start: 2019-01-10 | End: 2019-01-10

## 2019-01-10 RX ORDER — FAMOTIDINE 20 MG/1
20 TABLET ORAL ONCE
Status: CANCELLED | OUTPATIENT
Start: 2019-01-10 | End: 2019-01-10

## 2019-01-10 RX ORDER — SODIUM CHLORIDE, SODIUM LACTATE, POTASSIUM CHLORIDE, CALCIUM CHLORIDE 600; 310; 30; 20 MG/100ML; MG/100ML; MG/100ML; MG/100ML
INJECTION, SOLUTION INTRAVENOUS CONTINUOUS
Status: DISCONTINUED | OUTPATIENT
Start: 2019-01-10 | End: 2019-01-10

## 2019-01-10 RX ORDER — GENTAMICIN SULFATE 40 MG/ML
INJECTION, SOLUTION INTRAMUSCULAR; INTRAVENOUS AS NEEDED
Status: DISCONTINUED | OUTPATIENT
Start: 2019-01-10 | End: 2019-01-10 | Stop reason: HOSPADM

## 2019-01-10 RX ORDER — HYDROCODONE BITARTRATE AND ACETAMINOPHEN 5; 325 MG/1; MG/1
1 TABLET ORAL AS NEEDED
Status: DISCONTINUED | OUTPATIENT
Start: 2019-01-10 | End: 2019-01-10

## 2019-01-10 RX ORDER — MORPHINE SULFATE 10 MG/ML
6 INJECTION, SOLUTION INTRAMUSCULAR; INTRAVENOUS EVERY 10 MIN PRN
Status: DISCONTINUED | OUTPATIENT
Start: 2019-01-10 | End: 2019-01-10

## 2019-01-10 RX ORDER — LIDOCAINE HYDROCHLORIDE 10 MG/ML
INJECTION, SOLUTION EPIDURAL; INFILTRATION; INTRACAUDAL; PERINEURAL AS NEEDED
Status: DISCONTINUED | OUTPATIENT
Start: 2019-01-10 | End: 2019-01-10 | Stop reason: SURG

## 2019-01-10 RX ORDER — LIDOCAINE HYDROCHLORIDE 20 MG/ML
JELLY TOPICAL AS NEEDED
Status: DISCONTINUED | OUTPATIENT
Start: 2019-01-10 | End: 2019-01-10 | Stop reason: HOSPADM

## 2019-01-10 RX ORDER — HALOPERIDOL 5 MG/ML
0.25 INJECTION INTRAMUSCULAR ONCE AS NEEDED
Status: DISCONTINUED | OUTPATIENT
Start: 2019-01-10 | End: 2019-01-10

## 2019-01-10 RX ORDER — CEFAZOLIN SODIUM/WATER 2 G/20 ML
2 SYRINGE (ML) INTRAVENOUS ONCE
Status: COMPLETED | OUTPATIENT
Start: 2019-01-10 | End: 2019-01-10

## 2019-01-10 RX ORDER — SULFAMETHOXAZOLE AND TRIMETHOPRIM 800; 160 MG/1; MG/1
1 TABLET ORAL 2 TIMES DAILY
Qty: 6 TABLET | Refills: 1 | Status: SHIPPED | OUTPATIENT
Start: 2019-01-10 | End: 2019-01-13

## 2019-01-10 RX ORDER — PHENAZOPYRIDINE HYDROCHLORIDE 200 MG/1
100 TABLET, FILM COATED ORAL
Status: DISCONTINUED | OUTPATIENT
Start: 2019-01-10 | End: 2019-01-10

## 2019-01-10 RX ORDER — SCOLOPAMINE TRANSDERMAL SYSTEM 1 MG/1
1 PATCH, EXTENDED RELEASE TRANSDERMAL ONCE
Status: CANCELLED | OUTPATIENT
Start: 2019-01-10

## 2019-01-10 RX ORDER — ONDANSETRON 2 MG/ML
4 INJECTION INTRAMUSCULAR; INTRAVENOUS ONCE AS NEEDED
Status: DISCONTINUED | OUTPATIENT
Start: 2019-01-10 | End: 2019-01-10

## 2019-01-10 RX ORDER — METOPROLOL TARTRATE 5 MG/5ML
2.5 INJECTION INTRAVENOUS ONCE
Status: DISCONTINUED | OUTPATIENT
Start: 2019-01-10 | End: 2019-01-10

## 2019-01-10 RX ORDER — HYDROCODONE BITARTRATE AND ACETAMINOPHEN 5; 325 MG/1; MG/1
2 TABLET ORAL AS NEEDED
Status: DISCONTINUED | OUTPATIENT
Start: 2019-01-10 | End: 2019-01-10

## 2019-01-10 RX ORDER — METOCLOPRAMIDE 10 MG/1
10 TABLET ORAL ONCE
Status: CANCELLED | OUTPATIENT
Start: 2019-01-10 | End: 2019-01-10

## 2019-01-10 RX ORDER — PHENAZOPYRIDINE HYDROCHLORIDE 95 MG/1
95 TABLET ORAL 3 TIMES DAILY PRN
Qty: 21 TABLET | Refills: 6 | Status: SHIPPED | OUTPATIENT
Start: 2019-01-10 | End: 2019-01-28

## 2019-01-10 RX ORDER — MORPHINE SULFATE 4 MG/ML
2 INJECTION, SOLUTION INTRAMUSCULAR; INTRAVENOUS EVERY 10 MIN PRN
Status: DISCONTINUED | OUTPATIENT
Start: 2019-01-10 | End: 2019-01-10

## 2019-01-10 RX ADMIN — SODIUM CHLORIDE, SODIUM LACTATE, POTASSIUM CHLORIDE, CALCIUM CHLORIDE: 600; 310; 30; 20 INJECTION, SOLUTION INTRAVENOUS at 13:25:00

## 2019-01-10 RX ADMIN — SODIUM CHLORIDE, SODIUM LACTATE, POTASSIUM CHLORIDE, CALCIUM CHLORIDE: 600; 310; 30; 20 INJECTION, SOLUTION INTRAVENOUS at 14:05:00

## 2019-01-10 RX ADMIN — LIDOCAINE HYDROCHLORIDE 50 MG: 10 INJECTION, SOLUTION EPIDURAL; INFILTRATION; INTRACAUDAL; PERINEURAL at 13:29:00

## 2019-01-10 RX ADMIN — ONDANSETRON 4 MG: 2 INJECTION INTRAMUSCULAR; INTRAVENOUS at 13:37:00

## 2019-01-10 RX ADMIN — MIDAZOLAM HYDROCHLORIDE 0.5 MG: 1 INJECTION INTRAMUSCULAR; INTRAVENOUS at 13:27:00

## 2019-01-10 RX ADMIN — CEFAZOLIN SODIUM/WATER 2 G: 2 G/20 ML SYRINGE (ML) INTRAVENOUS at 13:41:00

## 2019-01-10 RX ADMIN — GLYCOPYRROLATE 0.2 MG: 0.2 INJECTION, SOLUTION INTRAMUSCULAR; INTRAVENOUS at 13:27:00

## 2019-01-10 RX ADMIN — DEXAMETHASONE SODIUM PHOSPHATE 4 MG: 4 MG/ML VIAL (ML) INJECTION at 13:37:00

## 2019-01-10 NOTE — ANESTHESIA POSTPROCEDURE EVALUATION
Patient: Ashutosh Coe    Procedure Summary     Date:  01/10/19 Room / Location:  [facility] OR  / [facility] OR    Anesthesia Start:  5245 Anesthesia Stop:      Procedure:  CYSTOSCOPY STENT INSERTION (Right ) Diagnosis:       Renal cell carcinoma ([facility] [address]

## 2019-01-10 NOTE — ANESTHESIA PROCEDURE NOTES
ANESTHESIA INTUBATION  Urgency: elective    Airway not difficult    General Information and Staff    Patient location during procedure: OR  Anesthesiologist: Ronn Hart DO  Resident/CRNA: Isaura Cross CRNA  Performed: anesthesiologist and C

## 2019-01-10 NOTE — BRIEF OP NOTE
[facility] POST ANESTHESIA CARE UNIT  Brief Op Note       Patients Name: Lorie Tolbert  Attending Physician: Dean Gilbert MD  Operating Physician: Ponce Meade MD  CSN: [id number]     Location:  OR  MRN: [id number]    Date of Birth: [birth year]

## 2019-01-10 NOTE — ANESTHESIA PREPROCEDURE EVALUATION
Anesthesia PreOp Note    HPI:     Javon Metzger is a 46year old female who presents for preoperative consultation requested by: Marla Mares MD    Date of Surgery: 1/10/2019    Procedure(s):  CYSTOSCOPY STENT INSERTION  Indication: Renal cell c CHOLECYSTECTOMY     • CORRECT BUNION,OTHR METHODS Right    • HIP REPLACEMENT SURGERY Left    • LAPAROSCOPIC CHOLECYSTECTOMY  9/21/2016   • LAPAROSCOPIC NEPHRECTOMY Right 10/1/2018    Performed by Kimberly Huynh MD at [address]   • NEPHRECTOMY Right 1 Epic:  lactated ringers infusion  Intravenous Continuous Rainer Joseph MD Last Rate: 20 mL/hr at 01/10/19 1048   ceFAZolin sodium (ANCEF/KEFZOL) 2 GM/20ML premix IV syringe 2 g 2 g Intravenous Once Rainer Joseph MD      No current Epic-ordered ou 01/10/2019    MCHC 33.5 01/10/2019    RDW 14.5 01/10/2019     01/10/2019    MPV 9.7 01/10/2019     Lab Results   Component Value Date     12/16/2018    K 3.9 12/16/2018     12/16/2018    CO2 22 12/16/2018    BUN 16 12/16/2018    RYAN

## 2019-01-10 NOTE — H&P
Manda Torres is a 46year old female. HPI:   No chief complaint on file.   This patient is a pleasant 58-year-old female who had a right partial nephrectomy on October 2018 she did excellent her margins were free she had a renal cell carcinoma was (hypertension)     Hypothyroid     Migraine     Obesity     PCOS (polycystic ovarian syndrome)     Fatty liver disease, nonalcoholic     Chronic back pain     Renal mass, right     Constipation, unspecified constipation type     Status post nephrectomy  Re History    Tobacco Use      Smoking status: Never Smoker      Smokeless tobacco: Never Used    Alcohol use:  Yes      Alcohol/week: 0.0 oz      Comment: occassionally     Drug use: No     Allergies none  Family history mother  age 66 of lung cancer and Intravenous, Once PRN  •  haloperidol lactate (HALDOL) 5 MG/ML injection 0.25 mg, 0.25 mg, Intravenous, Once PRN  •  Naloxone HCl (NARCAN) 0.4 MG/ML injection 80 mcg, 80 mcg, Intravenous, PRN    Allergies:     Ace Inhibitors          Coughing    Comment:Lis tenderness, pulse normal  NEURO/PSYCH: orientated x3, normal mood and affect, no sensory or motor deficit, muscle strength normal  :  Adnexa area palpably normal  Wound is well-healed no evidence of hernia no evidence of abdominal abnormalities laborator technique for dose reduction was used. After review of initial imaging and discussed with Dr. Cari Vanegas patient was recalled for additional imaging approximately 60 min after initial exam.   FINDINGS:  LIVER: There is diffuse hepatic steatosis.  No suspiciou RETROPERITONEUM: No mass or enlarged adenopathy. BOWEL/MESENTERY:  There is no small or large bowel obstruction. The terminal ileum is within normal limits. The appendix is not identified and there is history of appendectomy.  No inflammatory changes aroun contralateral kidney and also shows delayed excretion compared with the contralateral kidney. These findings remain relatively unchanged on 60 min delayed imaging. 2.  Hepatic steatosis. 3.  Post cholecystectomy. Post appendectomy.   Exam discussed with JODI on testing.   Explained procedure risk complications she understands except desires me to proceed             Belinda Kent MD

## 2019-01-10 NOTE — OPERATIVE REPORT
[facility]    PATIENT'S NAME: Yohan Carlos   ATTENDING PHYSICIAN: Brook Gordillo MD   OPERATING PHYSICIAN: Brook Gordillo MD   PATIENT ACCOUNT#:   [de-identified]    LOCATION:  [place] [address]  MEDICAL RECORD #:   [id number] retrograde pyelogram showed a slight narrowed area of the right proximal ureter, but I was able to get a cone-tipped catheter through this area. May be due to some type of intrinsic scarring or just the compression from the cystic lesion.   The ureteral ca might be a little scar tissue there or maybe some extrinsic compression from the cystic lesion. There was blunting of the upper pole collecting system, but no extravasation and no leak. Next, we passed up a 0.035 Glidewire easily.   I originally put a 32 c

## 2019-01-12 PROBLEM — N13.39 OTHER HYDRONEPHROSIS: Status: ACTIVE | Noted: 2019-01-12

## 2019-01-12 PROBLEM — R19.00 FLANK MASS: Status: ACTIVE | Noted: 2019-01-12

## 2019-01-12 PROBLEM — C64.1 RENAL CELL CARCINOMA OF RIGHT KIDNEY (HCC): Status: ACTIVE | Noted: 2019-01-12

## 2019-01-15 ENCOUNTER — OFFICE VISIT (OUTPATIENT)
Dept: INTERNAL MEDICINE CLINIC | Facility: CLINIC | Age: 49
End: 2019-01-15
Payer: COMMERCIAL

## 2019-01-15 ENCOUNTER — TELEPHONE (OUTPATIENT)
Dept: OTOLARYNGOLOGY | Facility: CLINIC | Age: 49
End: 2019-01-15

## 2019-01-15 VITALS
OXYGEN SATURATION: 98 % | BODY MASS INDEX: 38.89 KG/M2 | WEIGHT: 242 LBS | HEIGHT: 66 IN | TEMPERATURE: 98 F | SYSTOLIC BLOOD PRESSURE: 139 MMHG | HEART RATE: 83 BPM | DIASTOLIC BLOOD PRESSURE: 83 MMHG

## 2019-01-15 DIAGNOSIS — IMO0001 UNCONTROLLED TYPE 2 DIABETES MELLITUS WITHOUT COMPLICATION, WITHOUT LONG-TERM CURRENT USE OF INSULIN: ICD-10-CM

## 2019-01-15 DIAGNOSIS — E04.1 THYROID NODULE: Primary | ICD-10-CM

## 2019-01-15 DIAGNOSIS — I10 ESSENTIAL HYPERTENSION: Primary | ICD-10-CM

## 2019-01-15 PROCEDURE — 99214 OFFICE O/P EST MOD 30 MIN: CPT | Performed by: INTERNAL MEDICINE

## 2019-01-15 RX ORDER — AMLODIPINE BESYLATE 2.5 MG/1
2.5 TABLET ORAL 2 TIMES DAILY
Qty: 180 TABLET | Refills: 1 | Status: SHIPPED | OUTPATIENT
Start: 2019-01-15 | End: 2019-08-04

## 2019-01-15 NOTE — PROGRESS NOTES
HPI:    Patient ID: Yudy Peck is a 46year old female. HPI   R side flank itches. Had follow up in 1 week ago. CT done. Stent placed. Has F/U in 1 week. Now, has UTI and cramp. Hypertension  Patient is here for follow up of hypertension.  BP 01/10/2019 11:43 AM    CA 9.2 01/10/2019 11:43 AM    AST 30 01/10/2019 11:43 AM    ALT 35 01/10/2019 11:43 AM    TSH 0.54 10/02/2018 05:27 AM        Lab Results   Component Value Date/Time    CHOLEST 181 02/26/2018 07:05 AM    HDL 45 02/26/2018 07:05 AM Rfl: 0   Multiple Vitamins-Minerals (MULTIVITAMIN OR) Take by mouth. Disp:  Rfl:    Linagliptin (TRADJENTA) 5 MG Oral Tab Take 1 tablet by mouth  Q12hrs.  Disp: 180 tablet Rfl: 1   Olopatadine HCl 0.1 % Ophthalmic Solution Place 1 drop into both eyes 2 (two Skin cancer.     • Arthritis Mother    • Cancer Mother         lung, skin and MDS   • Hypertension Mother    • Heart Disorder Mother 61        CAD S/P MI.    • Cancer Brother         Hodgkin's   • Hypertension Brother    • Cancer Maternal Grandmothe Neurological: She is alert and oriented to person, place, and time. Skin: Skin is warm and dry. She is not diaphoretic. Psychiatric: She has a normal mood and affect.  Her behavior is normal. Judgment and thought content normal.   Nursing note and vit

## 2019-01-15 NOTE — PATIENT INSTRUCTIONS
ASSESSMENT/PLAN:   Essential hypertension  (primary encounter diagnosis) ? Control. Careful with diet and excercise at least 30 minutes 3-4 times a week. Check blood pressures at different times on different days. Can purchase own blood pressure monitor.  If

## 2019-01-15 NOTE — TELEPHONE ENCOUNTER
Informed patient per  she is due for follow up US of thyroid,central scheduling number provided #[phone]. Patient verbalized understanding.

## 2019-01-17 ENCOUNTER — TELEPHONE (OUTPATIENT)
Dept: INTERNAL MEDICINE CLINIC | Facility: CLINIC | Age: 49
End: 2019-01-17

## 2019-01-17 ENCOUNTER — HOSPITAL ENCOUNTER (OUTPATIENT)
Dept: ULTRASOUND IMAGING | Facility: HOSPITAL | Age: 49
Discharge: HOME | End: 2019-01-17
Attending: UROLOGY
Payer: COMMERCIAL

## 2019-01-17 DIAGNOSIS — L72.3 SEBACEOUS CYST: ICD-10-CM

## 2019-01-17 PROCEDURE — 76775 US EXAM ABDO BACK WALL LIM: CPT | Performed by: UROLOGY

## 2019-01-17 NOTE — TELEPHONE ENCOUNTER
AMARILIS Lee spoke to Dr. Jeannette Rose he will call her today he has some coupons for Juan Pablo Press is going to start on Jardiance. She just needs to stay well-hydrated with the Jardiance and should be okay we will watch her kidney functions.   Better cardioprotecti

## 2019-01-18 ENCOUNTER — HOSPITAL ENCOUNTER (OUTPATIENT)
Dept: ULTRASOUND IMAGING | Facility: HOSPITAL | Age: 49
Discharge: HOME | End: 2019-01-18
Attending: OTOLARYNGOLOGY
Payer: COMMERCIAL

## 2019-01-18 ENCOUNTER — TELEPHONE (OUTPATIENT)
Dept: NEPHROLOGY | Facility: CLINIC | Age: 49
End: 2019-01-18

## 2019-01-18 DIAGNOSIS — N28.1 RENAL CYST, RIGHT: Primary | ICD-10-CM

## 2019-01-18 DIAGNOSIS — E04.1 THYROID NODULE: ICD-10-CM

## 2019-01-18 PROCEDURE — 76536 US EXAM OF HEAD AND NECK: CPT | Performed by: OTOLARYNGOLOGY

## 2019-01-18 RX ORDER — IBUPROFEN 200 MG
400 TABLET ORAL EVERY 6 HOURS PRN
COMMUNITY
End: 2019-01-28

## 2019-01-18 NOTE — TELEPHONE ENCOUNTER
Pt states that Dr. Seferino Zelaya spoke to PCP Dr. Lis Maki regarding changing her diabetic medication. Pt was told to call this office if she didn't hear from anyone. Please call.

## 2019-01-19 NOTE — TELEPHONE ENCOUNTER
Spoke w pt   will stop trajenta  Add jardiance 25mg day   avoid dehydration   watch for yeaast of bladder infx   call me two weeks   will repeat bmp   thanks  Disc w dr Colón Backbone too

## 2019-01-21 ENCOUNTER — HOSPITAL ENCOUNTER (OUTPATIENT)
Dept: INTERVENTIONAL RADIOLOGY/VASCULAR | Facility: HOSPITAL | Age: 49
Discharge: HOME | End: 2019-01-21
Attending: UROLOGY | Admitting: UROLOGY
Payer: COMMERCIAL

## 2019-01-21 VITALS
RESPIRATION RATE: 18 BRPM | OXYGEN SATURATION: 95 % | BODY MASS INDEX: 39.53 KG/M2 | WEIGHT: 246 LBS | HEIGHT: 66 IN | HEART RATE: 53 BPM | SYSTOLIC BLOOD PRESSURE: 119 MMHG | DIASTOLIC BLOOD PRESSURE: 67 MMHG

## 2019-01-21 DIAGNOSIS — N28.1 RENAL CYST, RIGHT: ICD-10-CM

## 2019-01-21 LAB
B-HCG UR QL: NEGATIVE
BASOPHILS NFR FLD: 1 %
COLOR FLD: YELLOW
EOSINOPHIL NFR FLD: 8 %
GLUCOSE BLDC GLUCOMTR-MCNC: 180 MG/DL (ref 70–99)
LYMPHOCYTES NFR FLD: 0 %
MONOCYTES NFR FLD: 16 %
NEUTROPHILS NFR FLD: 75 %
RBC # FLD: 689 /CUMM (ref ?–1)
WBC # FLD: 124 /CUMM (ref ?–1)

## 2019-01-21 PROCEDURE — 87205 SMEAR GRAM STAIN: CPT | Performed by: RADIOLOGY

## 2019-01-21 PROCEDURE — BT41ZZZ ULTRASONOGRAPHY OF RIGHT KIDNEY: ICD-10-PCS | Performed by: RADIOLOGY

## 2019-01-21 PROCEDURE — 87070 CULTURE OTHR SPECIMN AEROBIC: CPT | Performed by: RADIOLOGY

## 2019-01-21 PROCEDURE — 81025 URINE PREGNANCY TEST: CPT | Performed by: RADIOLOGY

## 2019-01-21 PROCEDURE — 88112 CYTOPATH CELL ENHANCE TECH: CPT | Performed by: UROLOGY

## 2019-01-21 PROCEDURE — 87075 CULTR BACTERIA EXCEPT BLOOD: CPT | Performed by: UROLOGY

## 2019-01-21 PROCEDURE — 82962 GLUCOSE BLOOD TEST: CPT

## 2019-01-21 PROCEDURE — 82570 ASSAY OF URINE CREATININE: CPT | Performed by: RADIOLOGY

## 2019-01-21 PROCEDURE — 88305 TISSUE EXAM BY PATHOLOGIST: CPT | Performed by: UROLOGY

## 2019-01-21 PROCEDURE — 87176 TISSUE HOMOGENIZATION CULTR: CPT | Performed by: UROLOGY

## 2019-01-21 PROCEDURE — 0T903ZX DRAINAGE OF RIGHT KIDNEY, PERCUTANEOUS APPROACH, DIAGNOSTIC: ICD-10-PCS | Performed by: RADIOLOGY

## 2019-01-21 PROCEDURE — 82570 ASSAY OF URINE CREATININE: CPT | Performed by: UROLOGY

## 2019-01-21 PROCEDURE — 76942 ECHO GUIDE FOR BIOPSY: CPT

## 2019-01-21 PROCEDURE — 88341 IMHCHEM/IMCYTCHM EA ADD ANTB: CPT | Performed by: UROLOGY

## 2019-01-21 PROCEDURE — 10160 PNXR ASPIR ABSC HMTMA BULLA: CPT

## 2019-01-21 PROCEDURE — 87070 CULTURE OTHR SPECIMN AEROBIC: CPT | Performed by: UROLOGY

## 2019-01-21 PROCEDURE — 89051 BODY FLUID CELL COUNT: CPT | Performed by: CLINICAL NURSE SPECIALIST

## 2019-01-21 PROCEDURE — 89050 BODY FLUID CELL COUNT: CPT | Performed by: CLINICAL NURSE SPECIALIST

## 2019-01-21 PROCEDURE — 88342 IMHCHEM/IMCYTCHM 1ST ANTB: CPT | Performed by: UROLOGY

## 2019-01-21 PROCEDURE — 99152 MOD SED SAME PHYS/QHP 5/>YRS: CPT

## 2019-01-21 RX ORDER — SODIUM CHLORIDE 9 MG/ML
INJECTION, SOLUTION INTRAVENOUS
Status: DISCONTINUED
Start: 2019-01-21 | End: 2019-01-21

## 2019-01-21 RX ORDER — MIDAZOLAM HYDROCHLORIDE 1 MG/ML
INJECTION INTRAMUSCULAR; INTRAVENOUS
Status: DISCONTINUED
Start: 2019-01-21 | End: 2019-01-21 | Stop reason: WASHOUT

## 2019-01-21 RX ORDER — MIDAZOLAM HYDROCHLORIDE 1 MG/ML
INJECTION INTRAMUSCULAR; INTRAVENOUS
Status: COMPLETED
Start: 2019-01-21 | End: 2019-01-21

## 2019-01-21 RX ORDER — SODIUM CHLORIDE 9 MG/ML
INJECTION, SOLUTION INTRAVENOUS CONTINUOUS
Status: DISCONTINUED | OUTPATIENT
Start: 2019-01-21 | End: 2019-01-21

## 2019-01-21 NOTE — INTERVAL H&P NOTE
The above referenced H&P was reviewed by iDaz Cooley MD on 1/21/2019, the patient was examined and no significant changes have occurred in the patient's condition since the H&P was performed.   Risks, benefits, alternative treatments and consequences of no

## 2019-01-23 NOTE — H&P
[facility]    PATIENT'S NAME: Barber Perkins   ATTENDING PHYSICIAN: Valerie Alicea MD   PATIENT ACCOUNT#:   [de-identified]    LOCATION:  [place] [address]  MEDICAL RECORD #:   [id number]       YOB: 1970  ADMISSION DATE: demonstrate that on retrograde pyelogram at the time of her retrograde and it may have been something there from before, I wanted to leave the stent in a little bit longer. She is now on Myrbetriq, and this is helping with her bladder spasms.   We will hav president for a Leidy Urrutia Case 60. REVIEW OF SYSTEMS:  Her weight is the same. Appetite is good. No fever. She wears reading eyeglasses. She denies any ear, nose, mouth, throat problems. She denies any chest pain. She has a history of hypertension. seals and heals down.   We will get an ultrasound of the kidney next week to make sure all the fluid has resolved and then do a cystoscopy, right stent removal, right retrograde pyelogram, possible right ureteroscopy and look for any strictures and possibly

## 2019-01-23 NOTE — H&P (VIEW-ONLY)
[facility]    PATIENT'S NAME: Jose Miguel Morgan   ATTENDING PHYSICIAN: Leesa Schumacher MD   PATIENT ACCOUNT#:   [de-identified]    LOCATION:  [place] [address]  MEDICAL RECORD #:   [id number]       YOB: 1970  ADMISSION DATE: demonstrate that on retrograde pyelogram at the time of her retrograde and it may have been something there from before, I wanted to leave the stent in a little bit longer. She is now on Myrbetriq, and this is helping with her bladder spasms.   We will hav president for a Leidy Urrutia Case 60. REVIEW OF SYSTEMS:  Her weight is the same. Appetite is good. No fever. She wears reading eyeglasses. She denies any ear, nose, mouth, throat problems. She denies any chest pain. She has a history of hypertension. seals and heals down.   We will get an ultrasound of the kidney next week to make sure all the fluid has resolved and then do a cystoscopy, right stent removal, right retrograde pyelogram, possible right ureteroscopy and look for any strictures and possibly

## 2019-02-01 ENCOUNTER — HOSPITAL ENCOUNTER (OUTPATIENT)
Dept: ULTRASOUND IMAGING | Facility: HOSPITAL | Age: 49
Discharge: HOME | End: 2019-02-01
Attending: INTERNAL MEDICINE
Payer: COMMERCIAL

## 2019-02-01 DIAGNOSIS — N28.1 RENAL CYST: ICD-10-CM

## 2019-02-01 DIAGNOSIS — N13.9 OBSTRUCTION, UROPATHY: ICD-10-CM

## 2019-02-01 PROCEDURE — 76775 US EXAM ABDO BACK WALL LIM: CPT | Performed by: UROLOGY

## 2019-02-04 ENCOUNTER — HOSPITAL ENCOUNTER (OUTPATIENT)
Facility: HOSPITAL | Age: 49
Setting detail: HOSPITAL OUTPATIENT SURGERY
Discharge: HOME | End: 2019-02-04
Attending: UROLOGY | Admitting: UROLOGY
Payer: COMMERCIAL

## 2019-02-04 ENCOUNTER — ANESTHESIA EVENT (OUTPATIENT)
Dept: SURGERY | Facility: HOSPITAL | Age: 49
End: 2019-02-04
Payer: COMMERCIAL

## 2019-02-04 ENCOUNTER — APPOINTMENT (OUTPATIENT)
Dept: GENERAL RADIOLOGY | Facility: HOSPITAL | Age: 49
End: 2019-02-04
Attending: UROLOGY
Payer: COMMERCIAL

## 2019-02-04 ENCOUNTER — ANESTHESIA (OUTPATIENT)
Dept: SURGERY | Facility: HOSPITAL | Age: 49
End: 2019-02-04
Payer: COMMERCIAL

## 2019-02-04 VITALS
OXYGEN SATURATION: 96 % | DIASTOLIC BLOOD PRESSURE: 72 MMHG | TEMPERATURE: 97 F | RESPIRATION RATE: 18 BRPM | SYSTOLIC BLOOD PRESSURE: 146 MMHG | WEIGHT: 243 LBS | BODY MASS INDEX: 39.05 KG/M2 | HEART RATE: 74 BPM | HEIGHT: 66 IN

## 2019-02-04 LAB
B-HCG UR QL: NEGATIVE
GLUCOSE BLDC GLUCOMTR-MCNC: 105 MG/DL (ref 70–99)

## 2019-02-04 PROCEDURE — 82962 GLUCOSE BLOOD TEST: CPT

## 2019-02-04 PROCEDURE — 0T768DZ DILATION OF RIGHT URETER WITH INTRALUMINAL DEVICE, VIA NATURAL OR ARTIFICIAL OPENING ENDOSCOPIC: ICD-10-PCS | Performed by: UROLOGY

## 2019-02-04 PROCEDURE — BT1DZZZ FLUOROSCOPY OF RIGHT KIDNEY, URETER AND BLADDER: ICD-10-PCS | Performed by: UROLOGY

## 2019-02-04 PROCEDURE — 81025 URINE PREGNANCY TEST: CPT

## 2019-02-04 PROCEDURE — 0TP98DZ REMOVAL OF INTRALUMINAL DEVICE FROM URETER, VIA NATURAL OR ARTIFICIAL OPENING ENDOSCOPIC: ICD-10-PCS | Performed by: UROLOGY

## 2019-02-04 DEVICE — STENT URET 6F 28CM ULSMTH: Type: IMPLANTABLE DEVICE | Site: URETER | Status: FUNCTIONAL

## 2019-02-04 RX ORDER — MORPHINE SULFATE 4 MG/ML
2 INJECTION, SOLUTION INTRAMUSCULAR; INTRAVENOUS EVERY 10 MIN PRN
Status: DISCONTINUED | OUTPATIENT
Start: 2019-02-04 | End: 2019-02-04

## 2019-02-04 RX ORDER — METOCLOPRAMIDE 10 MG/1
10 TABLET ORAL ONCE
Status: COMPLETED | OUTPATIENT
Start: 2019-02-04 | End: 2019-02-04

## 2019-02-04 RX ORDER — DEXAMETHASONE SODIUM PHOSPHATE 4 MG/ML
VIAL (ML) INJECTION AS NEEDED
Status: DISCONTINUED | OUTPATIENT
Start: 2019-02-04 | End: 2019-02-04 | Stop reason: SURG

## 2019-02-04 RX ORDER — GLYCOPYRROLATE 0.2 MG/ML
INJECTION, SOLUTION INTRAMUSCULAR; INTRAVENOUS AS NEEDED
Status: DISCONTINUED | OUTPATIENT
Start: 2019-02-04 | End: 2019-02-04 | Stop reason: SURG

## 2019-02-04 RX ORDER — HYDROCODONE BITARTRATE AND ACETAMINOPHEN 5; 325 MG/1; MG/1
1 TABLET ORAL AS NEEDED
Status: DISCONTINUED | OUTPATIENT
Start: 2019-02-04 | End: 2019-02-04

## 2019-02-04 RX ORDER — PHENAZOPYRIDINE HYDROCHLORIDE 200 MG/1
200 TABLET, FILM COATED ORAL ONCE AS NEEDED
Status: DISCONTINUED | OUTPATIENT
Start: 2019-02-04 | End: 2019-02-04

## 2019-02-04 RX ORDER — HALOPERIDOL 5 MG/ML
0.25 INJECTION INTRAMUSCULAR ONCE AS NEEDED
Status: DISCONTINUED | OUTPATIENT
Start: 2019-02-04 | End: 2019-02-04

## 2019-02-04 RX ORDER — LIDOCAINE HYDROCHLORIDE 10 MG/ML
INJECTION, SOLUTION EPIDURAL; INFILTRATION; INTRACAUDAL; PERINEURAL AS NEEDED
Status: DISCONTINUED | OUTPATIENT
Start: 2019-02-04 | End: 2019-02-04 | Stop reason: SURG

## 2019-02-04 RX ORDER — CEFAZOLIN SODIUM/WATER 2 G/20 ML
2 SYRINGE (ML) INTRAVENOUS ONCE
Status: COMPLETED | OUTPATIENT
Start: 2019-02-04 | End: 2019-02-04

## 2019-02-04 RX ORDER — HYDROCODONE BITARTRATE AND ACETAMINOPHEN 5; 325 MG/1; MG/1
2 TABLET ORAL EVERY 4 HOURS PRN
Status: DISCONTINUED | OUTPATIENT
Start: 2019-02-04 | End: 2019-02-04

## 2019-02-04 RX ORDER — DEXTROSE MONOHYDRATE 25 G/50ML
50 INJECTION, SOLUTION INTRAVENOUS
Status: DISCONTINUED | OUTPATIENT
Start: 2019-02-04 | End: 2019-02-04

## 2019-02-04 RX ORDER — HYDROCODONE BITARTRATE AND ACETAMINOPHEN 5; 325 MG/1; MG/1
1 TABLET ORAL EVERY 4 HOURS PRN
Status: DISCONTINUED | OUTPATIENT
Start: 2019-02-04 | End: 2019-02-04

## 2019-02-04 RX ORDER — ACETAMINOPHEN 500 MG
1000 TABLET ORAL ONCE
Status: COMPLETED | OUTPATIENT
Start: 2019-02-04 | End: 2019-02-04

## 2019-02-04 RX ORDER — GENTAMICIN SULFATE 40 MG/ML
INJECTION, SOLUTION INTRAMUSCULAR; INTRAVENOUS AS NEEDED
Status: DISCONTINUED | OUTPATIENT
Start: 2019-02-04 | End: 2019-02-04 | Stop reason: HOSPADM

## 2019-02-04 RX ORDER — ONDANSETRON 2 MG/ML
4 INJECTION INTRAMUSCULAR; INTRAVENOUS ONCE AS NEEDED
Status: DISCONTINUED | OUTPATIENT
Start: 2019-02-04 | End: 2019-02-04

## 2019-02-04 RX ORDER — MORPHINE SULFATE 4 MG/ML
4 INJECTION, SOLUTION INTRAMUSCULAR; INTRAVENOUS EVERY 10 MIN PRN
Status: DISCONTINUED | OUTPATIENT
Start: 2019-02-04 | End: 2019-02-04

## 2019-02-04 RX ORDER — FAMOTIDINE 20 MG/1
20 TABLET ORAL ONCE
Status: DISCONTINUED | OUTPATIENT
Start: 2019-02-04 | End: 2019-02-04 | Stop reason: HOSPADM

## 2019-02-04 RX ORDER — ACETAMINOPHEN 325 MG/1
650 TABLET ORAL EVERY 4 HOURS PRN
Status: DISCONTINUED | OUTPATIENT
Start: 2019-02-04 | End: 2019-02-04

## 2019-02-04 RX ORDER — PHENAZOPYRIDINE HYDROCHLORIDE 95 MG/1
95 TABLET ORAL 3 TIMES DAILY PRN
Qty: 21 TABLET | Refills: 6 | Status: SHIPPED | OUTPATIENT
Start: 2019-02-04 | End: 2019-03-26

## 2019-02-04 RX ORDER — ONDANSETRON 2 MG/ML
INJECTION INTRAMUSCULAR; INTRAVENOUS AS NEEDED
Status: DISCONTINUED | OUTPATIENT
Start: 2019-02-04 | End: 2019-02-04 | Stop reason: SURG

## 2019-02-04 RX ORDER — HYDROCODONE BITARTRATE AND ACETAMINOPHEN 5; 325 MG/1; MG/1
2 TABLET ORAL AS NEEDED
Status: DISCONTINUED | OUTPATIENT
Start: 2019-02-04 | End: 2019-02-04

## 2019-02-04 RX ORDER — SODIUM CHLORIDE, SODIUM LACTATE, POTASSIUM CHLORIDE, CALCIUM CHLORIDE 600; 310; 30; 20 MG/100ML; MG/100ML; MG/100ML; MG/100ML
INJECTION, SOLUTION INTRAVENOUS CONTINUOUS
Status: DISCONTINUED | OUTPATIENT
Start: 2019-02-04 | End: 2019-02-04

## 2019-02-04 RX ORDER — METOPROLOL TARTRATE 5 MG/5ML
2.5 INJECTION INTRAVENOUS ONCE
Status: DISCONTINUED | OUTPATIENT
Start: 2019-02-04 | End: 2019-02-04

## 2019-02-04 RX ORDER — NALOXONE HYDROCHLORIDE 0.4 MG/ML
80 INJECTION, SOLUTION INTRAMUSCULAR; INTRAVENOUS; SUBCUTANEOUS AS NEEDED
Status: DISCONTINUED | OUTPATIENT
Start: 2019-02-04 | End: 2019-02-04

## 2019-02-04 RX ORDER — SCOLOPAMINE TRANSDERMAL SYSTEM 1 MG/1
1 PATCH, EXTENDED RELEASE TRANSDERMAL ONCE
Status: DISCONTINUED | OUTPATIENT
Start: 2019-02-04 | End: 2019-02-04

## 2019-02-04 RX ORDER — MORPHINE SULFATE 10 MG/ML
6 INJECTION, SOLUTION INTRAMUSCULAR; INTRAVENOUS EVERY 10 MIN PRN
Status: DISCONTINUED | OUTPATIENT
Start: 2019-02-04 | End: 2019-02-04

## 2019-02-04 RX ADMIN — CEFAZOLIN SODIUM/WATER 2 G: 2 G/20 ML SYRINGE (ML) INTRAVENOUS at 14:18:00

## 2019-02-04 RX ADMIN — LIDOCAINE HYDROCHLORIDE 50 MG: 10 INJECTION, SOLUTION EPIDURAL; INFILTRATION; INTRACAUDAL; PERINEURAL at 13:59:00

## 2019-02-04 RX ADMIN — ONDANSETRON 4 MG: 2 INJECTION INTRAMUSCULAR; INTRAVENOUS at 13:59:00

## 2019-02-04 RX ADMIN — GLYCOPYRROLATE 0.2 MG: 0.2 INJECTION, SOLUTION INTRAMUSCULAR; INTRAVENOUS at 13:59:00

## 2019-02-04 RX ADMIN — SODIUM CHLORIDE, SODIUM LACTATE, POTASSIUM CHLORIDE, CALCIUM CHLORIDE: 600; 310; 30; 20 INJECTION, SOLUTION INTRAVENOUS at 13:54:00

## 2019-02-04 RX ADMIN — DEXAMETHASONE SODIUM PHOSPHATE 4 MG: 4 MG/ML VIAL (ML) INJECTION at 13:59:00

## 2019-02-04 RX ADMIN — SODIUM CHLORIDE, SODIUM LACTATE, POTASSIUM CHLORIDE, CALCIUM CHLORIDE: 600; 310; 30; 20 INJECTION, SOLUTION INTRAVENOUS at 14:53:00

## 2019-02-04 NOTE — BRIEF OP NOTE
[facility] POST ANESTHESIA CARE UNIT  Brief Op Note       Patients Name: Carson Valle  Attending Physician: Isela Medina MD  Operating Physician: Luigi Benavides MD  CSN: [id number]     Location:  OR  MRN: [id number]    Date of Birth: [birth year]

## 2019-02-04 NOTE — ANESTHESIA PROCEDURE NOTES
ANESTHESIA INTUBATION  Date/Time: 2/4/2019 2:07 PM  Urgency: elective    Airway not difficult    General Information and Staff    Patient location during procedure: OR  Anesthesiologist: Lisandro Zee MD  Resident/CRNA: CORAL Bell

## 2019-02-04 NOTE — INTERVAL H&P NOTE
Pre-op Diagnosis: cyst of kidney, kidney cancer    The above referenced H&P was reviewed by Bonifacio Cordero MD on 2/4/2019, the patient was examined and no significant changes have occurred in the patient's condition since the H&P was performed.   I discu

## 2019-02-04 NOTE — ANESTHESIA POSTPROCEDURE EVALUATION
Patient: Jodee Saleh    Procedure Summary     Date:  02/04/19 Room / Location:  [address] [place] [address] OR 14 / [address] [place] [address] OR    Anesthesia Start:  9577 Anesthesia Stop:  4252    Procedure:  CYSTOSCOPY RETROGRADE (Right ) Diagnosis:  (cyst of kidney, kidney cancer)

## 2019-02-04 NOTE — ANESTHESIA PREPROCEDURE EVALUATION
Anesthesia PreOp Note    HPI:     Evelin Deluna is a 46year old female who presents for preoperative consultation requested by: Naida Frankel, MD    Date of Surgery: 2/4/2019    Procedure(s):  CYSTOSCOPY RETROGRADE  CYSTOSCOPY URETEROSCOPY  CYSTO iliopsoas burscetomy.  (hip)   • ARTHROSCOPY OF JOINT UNLISTED Right     bone spur and torn cartilage shoulder   • CARPAL TUNNEL RELEASE Right 2014   • CHOLECYSTECTOMY     • CORRECT BUNION,OTHR METHODS Right    • CYSTOSCOPY STENT INSERTION Right 1/10/2019 LOESTRIN FE 1 MG-10 MCG / 10 MCG Oral Tab daily.    Disp:  Rfl:  2/4/2019 at 0700       Current Facility-Administered Medications Ordered in Epic:  lactated ringers infusion  Intravenous Continuous Anuja Eng MD Last Rate: 20 mL/hr at 02/04/19 7299 Alcohol/week: 0.0 oz        Comment: occassionally       Drug use: No      Sexual activity: Not on file    Other Topics      Concerns:        Not on file    Social History Narrative      Not on file      Available pre-op labs reviewed.   Lab Results   Com have informed Evelin Deluna and/or legal guardian or family member of the nature of the anesthetic plan, benefits, risks including possible dental damage if relevant, major complications, and any alternative forms of anesthetic management.    All of th

## 2019-02-04 NOTE — OPERATIVE REPORT
[facility]    PATIENT'S NAME: Guillermo Wilson   ATTENDING PHYSICIAN: Efrain Block MD   OPERATING PHYSICIAN: Efrain Block MD   PATIENT ACCOUNT#:   [de-identified]    LOCATION:  [facility] PACU 6 [facility] 10  MEDICAL RECORD #:   [id number] make sure there was no other obstruction or other problems inhibiting the stent from draining adequately. She does have bladder spasms and is on Myrbetriq, and that seems to be helping.   I explained to her and her family the procedure, risks, complication or some type of stone, so we passed a 0.038 Glidewire with no pressure. Water flow passed up the rigid ureteroscope. The entire ureter looked good, but I could not get as far up to the proximal ureter and the UPJ junction.   I just wanted to make sure the thought it would be best to leave a stent in, so we left the stent without a string. I want to give this more time to decompress. We will keep her bladder pressure low with Myrbetriq and then follow it with ultrasound.   If, in a few weeks, the fluid chester

## 2019-02-11 ENCOUNTER — TELEPHONE (OUTPATIENT)
Dept: INTERNAL MEDICINE CLINIC | Facility: CLINIC | Age: 49
End: 2019-02-11

## 2019-02-11 ENCOUNTER — TELEPHONE (OUTPATIENT)
Dept: HEMATOLOGY/ONCOLOGY | Facility: HOSPITAL | Age: 49
End: 2019-02-11

## 2019-02-11 DIAGNOSIS — R92.8 ABNORMAL MAMMOGRAM OF RIGHT BREAST: Primary | ICD-10-CM

## 2019-02-12 ENCOUNTER — TELEPHONE (OUTPATIENT)
Dept: HEMATOLOGY/ONCOLOGY | Facility: HOSPITAL | Age: 49
End: 2019-02-12

## 2019-02-12 NOTE — TELEPHONE ENCOUNTER
Called Rekha regarding follow up with Dr Sara Durham and scan. Patient states Dr Nina Jacobson had CT scan in January with results reviewed and was not told she needed to follow up with Dr Sara Durham so she cancelled her appts with him.   Does not have any follow up with

## 2019-02-12 NOTE — TELEPHONE ENCOUNTER
Spoke with patient. Gynecologist already ordered the diagnostic mammogram for her. She will make sure the results are sent to you as well.

## 2019-02-12 NOTE — TELEPHONE ENCOUNTER
Soilael Sagrario of the left breast looks okay. On the right breast there is a little irregularity needs additional imaging. Orders written for additional imaging. Self breast exam every month.

## 2019-02-14 ENCOUNTER — TELEPHONE (OUTPATIENT)
Dept: INTERNAL MEDICINE CLINIC | Facility: CLINIC | Age: 49
End: 2019-02-14

## 2019-03-01 ENCOUNTER — HOSPITAL ENCOUNTER (OUTPATIENT)
Dept: ULTRASOUND IMAGING | Facility: HOSPITAL | Age: 49
Discharge: HOME | End: 2019-03-01
Attending: UROLOGY
Payer: COMMERCIAL

## 2019-03-01 DIAGNOSIS — N28.1 RENAL CYST: ICD-10-CM

## 2019-03-01 PROCEDURE — 76775 US EXAM ABDO BACK WALL LIM: CPT | Performed by: UROLOGY

## 2019-03-26 ENCOUNTER — OFFICE VISIT (OUTPATIENT)
Dept: INTERNAL MEDICINE CLINIC | Facility: CLINIC | Age: 49
End: 2019-03-26
Payer: COMMERCIAL

## 2019-03-26 VITALS
TEMPERATURE: 99 F | OXYGEN SATURATION: 94 % | HEART RATE: 60 BPM | SYSTOLIC BLOOD PRESSURE: 124 MMHG | WEIGHT: 249 LBS | HEIGHT: 66 IN | DIASTOLIC BLOOD PRESSURE: 68 MMHG | BODY MASS INDEX: 40.02 KG/M2

## 2019-03-26 DIAGNOSIS — IMO0001 UNCONTROLLED TYPE 2 DIABETES MELLITUS WITHOUT COMPLICATION, WITHOUT LONG-TERM CURRENT USE OF INSULIN: Primary | ICD-10-CM

## 2019-03-26 DIAGNOSIS — I10 ESSENTIAL HYPERTENSION: ICD-10-CM

## 2019-03-26 DIAGNOSIS — Z87.898 H/O RIGHT FLANK PAIN: ICD-10-CM

## 2019-03-26 DIAGNOSIS — M25.552 PAIN OF LEFT HIP JOINT: ICD-10-CM

## 2019-03-26 PROCEDURE — 99214 OFFICE O/P EST MOD 30 MIN: CPT | Performed by: INTERNAL MEDICINE

## 2019-03-26 RX ORDER — EMPAGLIFLOZIN 25 MG/1
25 TABLET, FILM COATED ORAL DAILY
Refills: 3 | COMMUNITY
Start: 2019-03-09 | End: 2019-08-06

## 2019-03-26 NOTE — PATIENT INSTRUCTIONS
ASSESSMENT/PLAN:   Uncontrolled type 2 diabetes mellitus without complication, without long-term current use of insulin (hcc)  (primary encounter diagnosis) ? Control. Check blood and urine.  Careful with diet and excercise at least 30 minutes 3-4 times a w

## 2019-03-26 NOTE — PROGRESS NOTES
HPI:    Patient ID: Louann Salmon is a 46year old female. HPI   Stent is out. Cyst is still there. 4-16-19 for U/S cyst. R side still pain but better since stent is out. Hypertension  Patient is here for follow up of hypertension.  BP at home: 01/10/2019 11:43 AM    CO2 20 (L) 01/10/2019 11:43 AM    CREATSERUM 1.14 01/10/2019 11:43 AM    CA 9.2 01/10/2019 11:43 AM    AST 30 01/10/2019 11:43 AM    ALT 35 01/10/2019 11:43 AM    TSH 0.54 10/02/2018 05:27 AM        Lab Results   Component Value Date Take 1 tablet (2.5 mg total) by mouth 2 (two) times daily. Disp: 180 tablet Rfl: 1   atenolol 25 MG Oral Tab Take 1 tablet (25 mg total) by mouth nightly.  Disp: 90 tablet Rfl: 1   famotidine 10 MG Oral Tab Take 1 tablet (10 mg total) by mouth 2 (two) times 1/10/2019    Performed by Víctor Hernandez MD at [facility] OR   • LAPAROSCOPIC NEPHRECTOMY Right 10/1/2018    Performed by Víctor Hernandez MD at [facility] OR   • NEPHRECTOMY Right 10/01/2018    Partial nephrectomy   • OTHER Right 2005    arthroscopy bon side, and 2+ on the left side. Pulmonary/Chest: Effort normal and breath sounds normal. No accessory muscle usage. No apnea, no tachypnea and no bradypnea. No respiratory distress. She has no decreased breath sounds. She has no wheezes.  She has no rhonch type 2 diabetes mellitus without complication, without long-term current use of insulin (hcc)  (primary encounter diagnosis) ? Control. Check blood and urine. Careful with diet and excercise at least 30 minutes 3-4 times a week.  Check sugars at different t

## 2019-03-28 ENCOUNTER — APPOINTMENT (OUTPATIENT)
Dept: LAB | Facility: HOSPITAL | Age: 49
End: 2019-03-28
Attending: INTERNAL MEDICINE
Payer: COMMERCIAL

## 2019-03-28 DIAGNOSIS — IMO0001 UNCONTROLLED TYPE 2 DIABETES MELLITUS WITHOUT COMPLICATION, WITHOUT LONG-TERM CURRENT USE OF INSULIN: ICD-10-CM

## 2019-03-28 DIAGNOSIS — E03.9 ACQUIRED HYPOTHYROIDISM: ICD-10-CM

## 2019-03-28 DIAGNOSIS — N13.9 OBSTRUCTION, UROPATHY: ICD-10-CM

## 2019-03-28 DIAGNOSIS — E11.9 TYPE 2 DIABETES MELLITUS WITHOUT COMPLICATION, WITHOUT LONG-TERM CURRENT USE OF INSULIN (HCC): ICD-10-CM

## 2019-03-28 DIAGNOSIS — E55.9 VITAMIN D DEFICIENCY: ICD-10-CM

## 2019-03-28 DIAGNOSIS — Z01.818 PRE-OP TESTING: ICD-10-CM

## 2019-03-28 PROCEDURE — 82043 UR ALBUMIN QUANTITATIVE: CPT

## 2019-03-28 PROCEDURE — 82306 VITAMIN D 25 HYDROXY: CPT

## 2019-03-28 PROCEDURE — 83036 HEMOGLOBIN GLYCOSYLATED A1C: CPT

## 2019-03-28 PROCEDURE — 36415 COLL VENOUS BLD VENIPUNCTURE: CPT

## 2019-03-28 PROCEDURE — 84443 ASSAY THYROID STIM HORMONE: CPT

## 2019-03-28 PROCEDURE — 82570 ASSAY OF URINE CREATININE: CPT

## 2019-03-28 PROCEDURE — 80061 LIPID PANEL: CPT

## 2019-03-28 PROCEDURE — 85027 COMPLETE CBC AUTOMATED: CPT

## 2019-03-28 PROCEDURE — 80053 COMPREHEN METABOLIC PANEL: CPT

## 2019-03-30 PROBLEM — E55.9 VITAMIN D DEFICIENCY: Status: ACTIVE | Noted: 2019-03-30

## 2019-04-09 RX ORDER — ATENOLOL 25 MG/1
25 TABLET ORAL NIGHTLY
Qty: 90 TABLET | Refills: 1 | Status: SHIPPED | OUTPATIENT
Start: 2019-04-09 | End: 2019-10-14

## 2019-04-09 NOTE — TELEPHONE ENCOUNTER
Refill passed per [facility], [facility] protocol.   Hypertensive Medications  Protocol Criteria:  · Appointment scheduled in the past 6 months or in the next 3 months  · BMP or CMP in the past 12 months  · Creatinine result < 2  Recent Outpatient Visits

## 2019-04-10 RX ORDER — SPIRONOLACTONE 100 MG/1
TABLET, FILM COATED ORAL
Qty: 135 TABLET | Refills: 1 | Status: SHIPPED | OUTPATIENT
Start: 2019-04-10 | End: 2019-10-14

## 2019-04-16 ENCOUNTER — HOSPITAL ENCOUNTER (OUTPATIENT)
Dept: ULTRASOUND IMAGING | Facility: HOSPITAL | Age: 49
Discharge: HOME | End: 2019-04-16
Attending: UROLOGY
Payer: COMMERCIAL

## 2019-04-16 DIAGNOSIS — N28.1 RENAL CYST: ICD-10-CM

## 2019-04-16 DIAGNOSIS — N13.30 HYDRONEPHROSIS, UNSPECIFIED HYDRONEPHROSIS TYPE: ICD-10-CM

## 2019-04-16 PROCEDURE — 76775 US EXAM ABDO BACK WALL LIM: CPT | Performed by: UROLOGY

## 2019-05-02 RX ORDER — LEVOTHYROXINE SODIUM 0.1 MG/1
TABLET ORAL
Qty: 90 TABLET | Refills: 1 | Status: SHIPPED | OUTPATIENT
Start: 2019-05-02 | End: 2019-11-02

## 2019-05-02 NOTE — TELEPHONE ENCOUNTER
Refill passed per [facility], [facility] protocol.   Hypothyroid Medications  Protocol Criteria:  Appointment scheduled in the past 12 months or the next 3 months  TSH resulted in the past 12 months that is normal  Recent Outpatient Visits            1 month ago U

## 2019-05-16 RX ORDER — CANDESARTAN 8 MG/1
TABLET ORAL
Qty: 90 TABLET | Refills: 1 | Status: SHIPPED | OUTPATIENT
Start: 2019-05-16 | End: 2019-11-17

## 2019-05-16 NOTE — TELEPHONE ENCOUNTER
Refill passed per [facility], [facility] protocol but high interaction with Celexitil requiring MD review.

## 2019-05-31 RX ORDER — EMPAGLIFLOZIN 25 MG/1
TABLET, FILM COATED ORAL
Qty: 30 TABLET | Refills: 0 | Status: SHIPPED | OUTPATIENT
Start: 2019-05-31 | End: 2019-06-21

## 2019-05-31 NOTE — TELEPHONE ENCOUNTER
LOV with Dr. Raul Clements was on 10/29/18. Last ov with Dr. Hassan Seip was 3/26/19. Refill encounter routed to Dr. Hassan Seip. (Dr. Raul Clements is out of the office this week until next Wednesday 6/5/19.

## 2019-06-21 RX ORDER — EMPAGLIFLOZIN 25 MG/1
TABLET, FILM COATED ORAL
Qty: 30 TABLET | Refills: 1 | Status: SHIPPED | OUTPATIENT
Start: 2019-06-21 | End: 2019-08-06

## 2019-07-03 ENCOUNTER — HOSPITAL ENCOUNTER (OUTPATIENT)
Dept: CT IMAGING | Facility: HOSPITAL | Age: 49
Discharge: HOME | End: 2019-07-03
Attending: UROLOGY
Payer: COMMERCIAL

## 2019-07-03 DIAGNOSIS — Z90.5 H/O PARTIAL NEPHRECTOMY: ICD-10-CM

## 2019-07-03 LAB — CREAT BLD-MCNC: 0.9 MG/DL (ref 0.55–1.02)

## 2019-07-03 PROCEDURE — 82565 ASSAY OF CREATININE: CPT

## 2019-07-03 PROCEDURE — 74177 CT ABD & PELVIS W/CONTRAST: CPT | Performed by: UROLOGY

## 2019-08-06 ENCOUNTER — OFFICE VISIT (OUTPATIENT)
Dept: INTERNAL MEDICINE CLINIC | Facility: CLINIC | Age: 49
End: 2019-08-06
Payer: COMMERCIAL

## 2019-08-06 ENCOUNTER — TELEPHONE (OUTPATIENT)
Dept: INTERNAL MEDICINE CLINIC | Facility: CLINIC | Age: 49
End: 2019-08-06

## 2019-08-06 VITALS
TEMPERATURE: 99 F | OXYGEN SATURATION: 97 % | SYSTOLIC BLOOD PRESSURE: 132 MMHG | DIASTOLIC BLOOD PRESSURE: 72 MMHG | WEIGHT: 244.5 LBS | HEART RATE: 64 BPM | HEIGHT: 66 IN | BODY MASS INDEX: 39.29 KG/M2

## 2019-08-06 DIAGNOSIS — IMO0001 UNCONTROLLED TYPE 2 DIABETES MELLITUS WITHOUT COMPLICATION, WITHOUT LONG-TERM CURRENT USE OF INSULIN: Primary | ICD-10-CM

## 2019-08-06 DIAGNOSIS — Z12.11 COLON CANCER SCREENING: ICD-10-CM

## 2019-08-06 DIAGNOSIS — I10 ESSENTIAL HYPERTENSION: ICD-10-CM

## 2019-08-06 DIAGNOSIS — R74.8 ELEVATED LIVER ENZYMES: ICD-10-CM

## 2019-08-06 DIAGNOSIS — R79.9 ABNORMAL BLOOD CELL COUNT: ICD-10-CM

## 2019-08-06 LAB
ANION GAP SERPL CALC-SCNC: 9 MMOL/L (ref 0–18)
BASOPHILS # BLD AUTO: 0.04 X10(3) UL (ref 0–0.2)
BASOPHILS NFR BLD AUTO: 0.5 %
BUN BLD-MCNC: 18 MG/DL (ref 7–18)
BUN/CREAT SERPL: 18 (ref 10–20)
CALCIUM BLD-MCNC: 9.6 MG/DL (ref 8.5–10.1)
CHLORIDE SERPL-SCNC: 105 MMOL/L (ref 98–112)
CO2 SERPL-SCNC: 27 MMOL/L (ref 21–32)
CREAT BLD-MCNC: 1 MG/DL (ref 0.55–1.02)
DEPRECATED RDW RBC AUTO: 43.9 FL (ref 35.1–46.3)
EOSINOPHIL # BLD AUTO: 0.11 X10(3) UL (ref 0–0.7)
EOSINOPHIL NFR BLD AUTO: 1.2 %
ERYTHROCYTE [DISTWIDTH] IN BLOOD BY AUTOMATED COUNT: 13 % (ref 11–15)
GLUCOSE BLD-MCNC: 107 MG/DL (ref 70–99)
HCT VFR BLD AUTO: 49.6 % (ref 35–48)
HGB BLD-MCNC: 16.5 G/DL (ref 12–16)
IMM GRANULOCYTES # BLD AUTO: 0.03 X10(3) UL (ref 0–1)
IMM GRANULOCYTES NFR BLD: 0.3 %
LYMPHOCYTES # BLD AUTO: 2.74 X10(3) UL (ref 1–4)
LYMPHOCYTES NFR BLD AUTO: 30.9 %
MCH RBC QN AUTO: 30.6 PG (ref 26–34)
MCHC RBC AUTO-ENTMCNC: 33.3 G/DL (ref 31–37)
MCV RBC AUTO: 92 FL (ref 80–100)
MONOCYTES # BLD AUTO: 0.84 X10(3) UL (ref 0.1–1)
MONOCYTES NFR BLD AUTO: 9.5 %
NEUTROPHILS # BLD AUTO: 5.12 X10 (3) UL (ref 1.5–7.7)
NEUTROPHILS # BLD AUTO: 5.12 X10(3) UL (ref 1.5–7.7)
NEUTROPHILS NFR BLD AUTO: 57.6 %
OSMOLALITY SERPL CALC.SUM OF ELEC: 294 MOSM/KG (ref 275–295)
PATIENT FASTING: NO
PLATELET # BLD AUTO: 248 10(3)UL (ref 150–450)
POTASSIUM SERPL-SCNC: 3.9 MMOL/L (ref 3.5–5.1)
RBC # BLD AUTO: 5.39 X10(6)UL (ref 3.8–5.3)
SODIUM SERPL-SCNC: 141 MMOL/L (ref 136–145)
WBC # BLD AUTO: 8.9 X10(3) UL (ref 4–11)

## 2019-08-06 PROCEDURE — 99214 OFFICE O/P EST MOD 30 MIN: CPT | Performed by: INTERNAL MEDICINE

## 2019-08-06 PROCEDURE — 36415 COLL VENOUS BLD VENIPUNCTURE: CPT | Performed by: INTERNAL MEDICINE

## 2019-08-06 RX ORDER — CLINDAMYCIN PHOSPHATE 10 MG/G
AEROSOL, FOAM TOPICAL
COMMUNITY
Start: 2019-08-01 | End: 2020-12-02

## 2019-08-06 RX ORDER — AMLODIPINE BESYLATE 2.5 MG/1
TABLET ORAL
Qty: 180 TABLET | Refills: 1 | Status: SHIPPED | OUTPATIENT
Start: 2019-08-06 | End: 2020-02-02

## 2019-08-06 NOTE — TELEPHONE ENCOUNTER
Refill passed per [facility], [facility] protocol.   Hypertensive Medications  Protocol Criteria:  · Appointment scheduled in the past 6 months or in the next 3 months  · BMP or CMP in the past 12 months  · Creatinine result < 2  Recent Outpatient Visits

## 2019-08-06 NOTE — PROGRESS NOTES
HPI:    Patient ID: Pratibha Knox is a 46year old female. HPI   Diabetes  Patient here for follow up of Diabetes. Has been taking medications regularly. Checks sugars 1 times daily. Fasting sugars average 118-145.    moderately active   Watches CA 9.4 03/28/2019 06:57 AM    AST 23 03/28/2019 06:57 AM    ALT 46 03/28/2019 06:57 AM    TSH 1.560 03/28/2019 06:57 AM        Lab Results   Component Value Date/Time    CHOLEST 164 03/28/2019 06:57 AM    HDL 42 03/28/2019 06:57 AM    TRIG 142 03/28/201 ONE-HALF (1/2) TABLET IN THE EVENING Disp: 135 tablet Rfl: 1   atenolol 25 MG Oral Tab Take 1 tablet (25 mg total) by mouth nightly. Disp: 90 tablet Rfl: 1   famotidine 10 MG Oral Tab Take 1 tablet (10 mg total) by mouth 2 (two) times daily.  Disp: 60 table Family History   Problem Relation Age of Onset   • Diabetes Father    • Arthritis Father    • Hypertension Father    • Heart Disease Father 76        aortic valve disease   • Cancer Father         Skin cancer.     • Arthritis Mother    • Cancer Mother no edema. Neurological: She is alert and oriented to person, place, and time. Skin: Skin is warm and dry. She is not diaphoretic. Psychiatric: She has a normal mood and affect. Her behavior is normal.   Nursing note and vitals reviewed.            ASS (INTERNAL)        XX#[id number]

## 2019-08-06 NOTE — PATIENT INSTRUCTIONS
ASSESSMENT/PLAN:   Uncontrolled type 2 diabetes mellitus without complication, without long-term current use of insulin (hcc)  (primary encounter diagnosis) Not good control. Add levemir 8 units at night.  Careful with diet and excercise at least 30 minutes

## 2019-08-07 NOTE — TELEPHONE ENCOUNTER
Return in about 6 weeks (around 9/17/2019), or if symptoms worsen or fail to improve.     Patient requesting a Tuesday evening after 5pm or Saturday appointment

## 2019-08-08 ENCOUNTER — MED REC SCAN ONLY (OUTPATIENT)
Dept: INTERNAL MEDICINE CLINIC | Facility: CLINIC | Age: 49
End: 2019-08-08

## 2019-08-09 ENCOUNTER — APPOINTMENT (OUTPATIENT)
Dept: LAB | Facility: HOSPITAL | Age: 49
End: 2019-08-09
Attending: INTERNAL MEDICINE
Payer: COMMERCIAL

## 2019-08-09 DIAGNOSIS — R79.9 ABNORMAL BLOOD CELL COUNT: ICD-10-CM

## 2019-08-09 PROCEDURE — 81402 MOPATH PROCEDURE LEVEL 3: CPT

## 2019-08-09 PROCEDURE — 81219 CALR GENE COM VARIANTS: CPT

## 2019-08-09 PROCEDURE — 82668 ASSAY OF ERYTHROPOIETIN: CPT

## 2019-08-09 PROCEDURE — 36415 COLL VENOUS BLD VENIPUNCTURE: CPT

## 2019-08-09 PROCEDURE — 81403 MOPATH PROCEDURE LEVEL 4: CPT

## 2019-08-09 PROCEDURE — 81479 UNLISTED MOLECULAR PATHOLOGY: CPT

## 2019-08-10 LAB — ERYTHROPOIETIN (EPO): 11 MU/ML

## 2019-08-12 ENCOUNTER — TELEPHONE (OUTPATIENT)
Dept: INTERNAL MEDICINE CLINIC | Facility: CLINIC | Age: 49
End: 2019-08-12

## 2019-08-12 NOTE — TELEPHONE ENCOUNTER
Lab has to modify your recent lab orders for this patient. They stated the MONICA 2 will automatically run if the MPN is negative. Just AMOSI.

## 2019-09-24 ENCOUNTER — OFFICE VISIT (OUTPATIENT)
Dept: INTERNAL MEDICINE CLINIC | Facility: CLINIC | Age: 49
End: 2019-09-24
Payer: COMMERCIAL

## 2019-09-24 ENCOUNTER — TELEPHONE (OUTPATIENT)
Dept: INTERNAL MEDICINE CLINIC | Facility: CLINIC | Age: 49
End: 2019-09-24

## 2019-09-24 VITALS
HEART RATE: 56 BPM | SYSTOLIC BLOOD PRESSURE: 120 MMHG | DIASTOLIC BLOOD PRESSURE: 66 MMHG | OXYGEN SATURATION: 98 % | TEMPERATURE: 98 F | WEIGHT: 246.19 LBS | BODY MASS INDEX: 39.57 KG/M2 | HEIGHT: 66 IN

## 2019-09-24 DIAGNOSIS — K76.0 FATTY LIVER DISEASE, NONALCOHOLIC: Primary | ICD-10-CM

## 2019-09-24 DIAGNOSIS — IMO0001 UNCONTROLLED TYPE 2 DIABETES MELLITUS WITHOUT COMPLICATION, WITHOUT LONG-TERM CURRENT USE OF INSULIN: ICD-10-CM

## 2019-09-24 DIAGNOSIS — Z12.11 COLON CANCER SCREENING: ICD-10-CM

## 2019-09-24 DIAGNOSIS — I10 ESSENTIAL HYPERTENSION: ICD-10-CM

## 2019-09-24 PROCEDURE — 99214 OFFICE O/P EST MOD 30 MIN: CPT | Performed by: INTERNAL MEDICINE

## 2019-09-24 NOTE — PATIENT INSTRUCTIONS
ASSESSMENT/PLAN:   Fatty liver disease, nonalcoholic  (primary encounter diagnosis) Weight loss. Essential hypertension Good control. Careful with diet and excercise at least 30 minutes 3-4 times a week.  Check blood pressures at different times on d

## 2019-09-24 NOTE — TELEPHONE ENCOUNTER
Return in about 2 months (around 11/24/2019), or if symptoms worsen or fail to improve. Check out comments: Follow up with pre op. Patient requesting appointment for either office. Please advise time and day.

## 2019-09-24 NOTE — PROGRESS NOTES
HPI:    Patient ID: Armando Daly is a 46year old female. HPI   Hypertension  Patient is here for follow up of hypertension. BP at home: not check. Has been compliant with medications.   Exercise level: moderately active and has been following lo 07:03 PM    CO2 27.0 08/06/2019 07:03 PM    CREATSERUM 1.00 08/06/2019 07:03 PM    CA 9.6 08/06/2019 07:03 PM    AST 23 03/28/2019 06:57 AM    ALT 46 03/28/2019 06:57 AM    TSH 1.560 03/28/2019 06:57 AM        Lab Results   Component Value Date/Time    CHO Tab TAKE 1 TABLET BEFORE BREAKFAST Disp: 90 tablet Rfl: 1   spironolactone 100 MG Oral Tab TAKE 1 TABLET EVERY MORNING AND ONE-HALF (1/2) TABLET IN THE EVENING Disp: 135 tablet Rfl: 1   atenolol 25 MG Oral Tab Take 1 tablet (25 mg total) by mouth nightly. 01/2019    Status post Left lateral epicondylar release. Done by [facility].       Family History   Problem Relation Age of Onset   • Diabetes Father    • Arthritis Father    • Hypertension Father    • Heart Disease Father 76        aortic valve no tenderness. Abdominal: Soft. There is no tenderness. Musculoskeletal: She exhibits no edema. Neurological: She is alert and oriented to person, place, and time. Skin: Skin is warm and dry. She is not diaphoretic.    Psychiatric: She has a normal

## 2019-10-15 RX ORDER — ATENOLOL 25 MG/1
TABLET ORAL
Qty: 90 TABLET | Refills: 1 | Status: SHIPPED | OUTPATIENT
Start: 2019-10-15 | End: 2020-04-04

## 2019-10-15 RX ORDER — SPIRONOLACTONE 100 MG/1
TABLET, FILM COATED ORAL
Qty: 135 TABLET | Refills: 1 | Status: SHIPPED | OUTPATIENT
Start: 2019-10-15 | End: 2020-04-04

## 2019-10-15 NOTE — TELEPHONE ENCOUNTER
Dr Deepa Crews please advise on Spirnolactone as there is high drug interaction for hyperkalemia when I go to sign it. Thanks.

## 2019-10-15 NOTE — TELEPHONE ENCOUNTER
Refill passed per [facility], [facility] protocol.   Hypertensive Medications  Protocol Criteria:  · Appointment scheduled in the past 6 months or in the next 3 months  · BMP or CMP in the past 12 months  · Creatinine result < 2  Recent Outpatient Visits

## 2019-10-16 ENCOUNTER — TELEPHONE (OUTPATIENT)
Dept: INTERNAL MEDICINE CLINIC | Facility: CLINIC | Age: 49
End: 2019-10-16

## 2019-10-16 NOTE — TELEPHONE ENCOUNTER
Patient is requesting for a new appointment to see . Patient stt that she can't see Dr on the day the patient was giving .  patient is free the week of the 25th or dec 2nd       Please advise

## 2019-11-04 RX ORDER — LEVOTHYROXINE SODIUM 0.1 MG/1
TABLET ORAL
Qty: 90 TABLET | Refills: 1 | Status: SHIPPED | OUTPATIENT
Start: 2019-11-04 | End: 2020-05-08

## 2019-11-04 NOTE — TELEPHONE ENCOUNTER
Refill passed per [facility] protocol.   Hypothyroid Medications  Protocol Criteria:  Appointment scheduled in the past 12 months or the next 3 months  TSH resulted in the past 12 months that is normal  Recent Outpatient Visits            1 month ago Fatty liver disease, nonalcoholic    [facility], [address], Danyel Eugene., MD    Office Visit    3 months ago Uncontrolled type 2 diabetes mellitus without complication, without long-term current use of insulin [facility])    [facility], [address], Danyel Eugene., MD    Office Visit    7 months ago Uncontrolled type 2 diabetes mellitus without complication, without long-term current use of insulin [facility])    [facility], [address], Danyel Eugene., MD    Office Visit    9 months ago Essential hypertension    Danyel Collins., MD    Office Visit    11 months ago Uncontrolled type 2 diabetes mellitus without complication, without long-term current use of insulin [facility])    [facility], [address], Danyel Eugene., MD    Office Visit        Future Appointments       Provider Department Appt Notes    In 4 days SP GASTRO NURSE 3 [facility] - [facility] SAM CUNN[place] OA - colon cancer screening ([employer] PPO-sent to [facility]) #[phone] cell    In 3 weeks [facility] per Dr Dre Massey    In 3 weeks [facility] Ultrasound         Lab Results   Component Value Date    TSH 1.560 03/28/2019    THYROIDFUNC 1.59 06/22/2016

## 2019-11-13 ENCOUNTER — TELEPHONE (OUTPATIENT)
Dept: INTERNAL MEDICINE CLINIC | Facility: CLINIC | Age: 49
End: 2019-11-13

## 2019-11-13 NOTE — TELEPHONE ENCOUNTER
Got message from Dr. Mara Alvarado from [facility]  orthopedics regarding right foot sprain. Presurgical labs urinalysis and chest x-ray. She has an appointment with me November 26.  I do not know when she is scheduled for surgery?  if it is okay, to do all thos

## 2019-11-18 RX ORDER — CANDESARTAN 8 MG/1
TABLET ORAL
Qty: 90 TABLET | Refills: 1 | Status: SHIPPED | OUTPATIENT
Start: 2019-11-18 | End: 2020-05-13

## 2019-11-18 NOTE — TELEPHONE ENCOUNTER
Dr. Chris Mejia, please sign off on Candesartan refill. There is a high drug interaction with spironolactone when I go to sign it.  Thanks

## 2019-11-26 ENCOUNTER — OFFICE VISIT (OUTPATIENT)
Dept: INTERNAL MEDICINE CLINIC | Facility: CLINIC | Age: 49
End: 2019-11-26
Payer: COMMERCIAL

## 2019-11-26 VITALS
HEIGHT: 66 IN | SYSTOLIC BLOOD PRESSURE: 128 MMHG | HEART RATE: 71 BPM | OXYGEN SATURATION: 97 % | WEIGHT: 244.38 LBS | DIASTOLIC BLOOD PRESSURE: 80 MMHG | TEMPERATURE: 98 F | BODY MASS INDEX: 39.28 KG/M2

## 2019-11-26 DIAGNOSIS — I10 ESSENTIAL HYPERTENSION: Primary | ICD-10-CM

## 2019-11-26 DIAGNOSIS — E03.9 ACQUIRED HYPOTHYROIDISM: ICD-10-CM

## 2019-11-26 DIAGNOSIS — Z01.818 PRE-OP EXAM: ICD-10-CM

## 2019-11-26 DIAGNOSIS — E55.9 VITAMIN D DEFICIENCY: ICD-10-CM

## 2019-11-26 DIAGNOSIS — C64.1 RENAL CELL CARCINOMA OF RIGHT KIDNEY (HCC): ICD-10-CM

## 2019-11-26 DIAGNOSIS — Z12.39 BREAST CANCER SCREENING: ICD-10-CM

## 2019-11-26 DIAGNOSIS — E11.9 TYPE 2 DIABETES MELLITUS WITHOUT COMPLICATION, WITHOUT LONG-TERM CURRENT USE OF INSULIN (HCC): ICD-10-CM

## 2019-11-26 DIAGNOSIS — Z12.11 COLON CANCER SCREENING: ICD-10-CM

## 2019-11-26 PROBLEM — R14.2 BELCHING SYMPTOM: Status: ACTIVE | Noted: 2018-11-13

## 2019-11-26 PROCEDURE — 99215 OFFICE O/P EST HI 40 MIN: CPT | Performed by: INTERNAL MEDICINE

## 2019-11-26 PROCEDURE — 93000 ELECTROCARDIOGRAM COMPLETE: CPT | Performed by: INTERNAL MEDICINE

## 2019-11-26 NOTE — PROGRESS NOTES
HPI:    Patient ID: Nicholas Alvarez is a 46year old female. HPI     Nicholas Alvarez is a 46year old female who presents for a pre-operative physical exam.   Pt has had previous anesthesia:  Yes.   Previous complications:  No  Patient presents wit [facility].     • PONV (postoperative nausea and vomiting)     difficulty arousing       Past Surgical History:   Procedure Laterality Date   • APPENDECTOMY  2008   • ARTHROSCOPY OF JOINT UNLISTED Left 1-4-16    Labral tear yan cervantes Alcohol/week: 0.0 standard drinks        Comment: occassionally       Drug use: No        EXAM:   /80 (BP Location: Right arm, Cuff Size: large)   Pulse 71   Temp 98 °F (36.7 °C) (Oral)   Ht 5' 6\" (1.676 m)   Wt 244 lb 6.4 oz (110.9 kg)   SpO2 97% follow-ups on file. This consult was sent back the referring physician,          Hypertension  Patient is here for follow up of hypertension. BP at home: same. Has been compliant with medications.   Exercise level: moderately active and has been foll TABLET NIGHTLY - -   Weight (enc vitals) - 244 lb 6.4 oz - 240 lb 246 lb 3.2 oz   BP (enc vitals) - 128/80 - - 120/66   Last Foot Exam - - - - -   PHQ2 Score - - - - -   PHQ2 Score - 0 - - -   PHQ2 Score (Depression/Suicide Screening) - - - - -   PHQ4 Scor hallucinations, self-injury, sleep disturbance and suicidal ideas. The patient is not nervous/anxious and is not hyperactive. All other systems reviewed and are negative.         Current Outpatient Medications   Medication Sig Dispense Refill   • Calcium Surgical History:   Procedure Laterality Date   • APPENDECTOMY  2008   • ARTHROSCOPY OF JOINT UNLISTED Left 1-4-16    Labral tear reapir, iliopsoas burscetomy.  (hip)   • ARTHROSCOPY OF JOINT UNLISTED Right     bone spur and torn cartilage shoulder   • CARP not intubated. HENT:   Head: Normocephalic and atraumatic. Right Ear: Tympanic membrane, external ear and ear canal normal. No lacerations. No drainage, swelling or tenderness. No foreign bodies. No mastoid tenderness.  Tympanic membrane is not injected no hemorrhage. Left conjunctiva is not injected. Left conjunctiva has no hemorrhage. No scleral icterus. Right eye exhibits normal extraocular motion and no nystagmus. Left eye exhibits normal extraocular motion and no nystagmus.    Neck: Trachea normal and posterior cervical adenopathy present. Left cervical: No superficial cervical, no deep cervical and no posterior cervical adenopathy present. Right: No supraclavicular adenopathy present. Left: No supraclavicular adenopathy present.    Ranjana Mccarthy surgery. Okay to take Tylenol. Take all medicines with sips of food on morning of surgery except for insulin and Jardiance.     Orders Placed This Encounter      Prothrombin Time (PT) [E]      PTT, Activated [E]      Hemoglobin A1C [E]      Vitamin D, 25-

## 2019-11-27 ENCOUNTER — HOSPITAL ENCOUNTER (OUTPATIENT)
Dept: ULTRASOUND IMAGING | Facility: HOSPITAL | Age: 49
Discharge: HOME | End: 2019-11-27
Attending: UROLOGY
Payer: COMMERCIAL

## 2019-11-27 ENCOUNTER — LAB ENCOUNTER (OUTPATIENT)
Dept: LAB | Facility: HOSPITAL | Age: 49
End: 2019-11-27
Attending: UROLOGY
Payer: COMMERCIAL

## 2019-11-27 DIAGNOSIS — Z90.5 HX OF PARTIAL NEPHRECTOMY: ICD-10-CM

## 2019-11-27 DIAGNOSIS — Z01.818 PRE-OP EXAM: ICD-10-CM

## 2019-11-27 DIAGNOSIS — Z90.5 H/O PARTIAL NEPHRECTOMY: Primary | ICD-10-CM

## 2019-11-27 DIAGNOSIS — N13.30 HYDRONEPHROSIS: ICD-10-CM

## 2019-11-27 DIAGNOSIS — E55.9 VITAMIN D DEFICIENCY: ICD-10-CM

## 2019-11-27 DIAGNOSIS — IMO0001 UNCONTROLLED TYPE 2 DIABETES MELLITUS WITHOUT COMPLICATION, WITHOUT LONG-TERM CURRENT USE OF INSULIN: ICD-10-CM

## 2019-11-27 DIAGNOSIS — E11.9 TYPE 2 DIABETES MELLITUS WITHOUT COMPLICATION, WITHOUT LONG-TERM CURRENT USE OF INSULIN (HCC): ICD-10-CM

## 2019-11-27 PROCEDURE — 82306 VITAMIN D 25 HYDROXY: CPT

## 2019-11-27 PROCEDURE — 76775 US EXAM ABDO BACK WALL LIM: CPT | Performed by: UROLOGY

## 2019-11-27 PROCEDURE — 36415 COLL VENOUS BLD VENIPUNCTURE: CPT

## 2019-11-27 PROCEDURE — 80053 COMPREHEN METABOLIC PANEL: CPT

## 2019-11-27 PROCEDURE — 85730 THROMBOPLASTIN TIME PARTIAL: CPT

## 2019-11-27 PROCEDURE — 83036 HEMOGLOBIN GLYCOSYLATED A1C: CPT

## 2019-11-27 PROCEDURE — 85025 COMPLETE CBC W/AUTO DIFF WBC: CPT

## 2019-11-27 PROCEDURE — 85610 PROTHROMBIN TIME: CPT

## 2019-11-29 ENCOUNTER — TELEPHONE (OUTPATIENT)
Dept: INTERNAL MEDICINE CLINIC | Facility: CLINIC | Age: 49
End: 2019-11-29

## 2019-11-29 ENCOUNTER — MED REC SCAN ONLY (OUTPATIENT)
Dept: INTERNAL MEDICINE CLINIC | Facility: CLINIC | Age: 49
End: 2019-11-29

## 2019-12-07 ENCOUNTER — TELEPHONE (OUTPATIENT)
Dept: INTERNAL MEDICINE CLINIC | Facility: CLINIC | Age: 49
End: 2019-12-07

## 2019-12-07 DIAGNOSIS — R74.8 ELEVATED LIVER ENZYMES: Primary | ICD-10-CM

## 2019-12-07 NOTE — TELEPHONE ENCOUNTER
White blood cell cell counts look okay. Sugar slightly help high at 171. Sodium potassium electrolytes look okay. Declining kidney function at 1. 11. And liver enzymes ALT is 73 AST is 44. Slight decline in kidney function also from prior.   And liver e

## 2019-12-09 ENCOUNTER — MED REC SCAN ONLY (OUTPATIENT)
Dept: INTERNAL MEDICINE CLINIC | Facility: CLINIC | Age: 49
End: 2019-12-09

## 2019-12-09 NOTE — TELEPHONE ENCOUNTER
Attempt made to contact patient; no answer, left message instructing patient to return call to the clinic.

## 2019-12-15 ENCOUNTER — LAB ENCOUNTER (OUTPATIENT)
Dept: LAB | Facility: HOSPITAL | Age: 49
End: 2019-12-15
Attending: INTERNAL MEDICINE
Payer: COMMERCIAL

## 2019-12-15 DIAGNOSIS — R74.8 ELEVATED LIVER ENZYMES: ICD-10-CM

## 2019-12-15 PROCEDURE — 36415 COLL VENOUS BLD VENIPUNCTURE: CPT

## 2019-12-15 PROCEDURE — 87340 HEPATITIS B SURFACE AG IA: CPT

## 2019-12-15 PROCEDURE — 86709 HEPATITIS A IGM ANTIBODY: CPT

## 2019-12-15 PROCEDURE — 86708 HEPATITIS A ANTIBODY: CPT

## 2019-12-15 PROCEDURE — 86803 HEPATITIS C AB TEST: CPT

## 2019-12-15 PROCEDURE — 80500 HEPATITIS A B + C PROFILE: CPT

## 2019-12-15 PROCEDURE — 86706 HEP B SURFACE ANTIBODY: CPT

## 2019-12-15 PROCEDURE — 86704 HEP B CORE ANTIBODY TOTAL: CPT

## 2019-12-15 PROCEDURE — 82728 ASSAY OF FERRITIN: CPT

## 2019-12-15 PROCEDURE — 80053 COMPREHEN METABOLIC PANEL: CPT

## 2020-01-16 ENCOUNTER — TELEPHONE (OUTPATIENT)
Dept: INTERNAL MEDICINE CLINIC | Facility: CLINIC | Age: 50
End: 2020-01-16

## 2020-02-02 RX ORDER — EMPAGLIFLOZIN 25 MG/1
TABLET, FILM COATED ORAL
Qty: 90 TABLET | Refills: 1 | Status: SHIPPED | OUTPATIENT
Start: 2020-02-02 | End: 2020-08-18

## 2020-02-02 RX ORDER — AMLODIPINE BESYLATE 2.5 MG/1
TABLET ORAL
Qty: 180 TABLET | Refills: 1 | Status: SHIPPED | OUTPATIENT
Start: 2020-02-02 | End: 2020-08-05

## 2020-02-02 NOTE — TELEPHONE ENCOUNTER
Refill passed per [facility] protocol.   Diabetes Medications  Protocol Criteria:  · Appointment scheduled in the past 6 months or the next 3 months  · A1C < 7.5 in the past 6 months  · Creatinine in the past 12 months  · Creatinine result < 1.5   Rece months ago Essential hypertension    [facility], [facility], [address], Mireya Denise MD    Office Visit    2 months ago Colon cancer screening    [facility] - [address] [facility]    Nurse Only    4 months ago Fatty liver disease,

## 2020-02-06 ENCOUNTER — MED REC SCAN ONLY (OUTPATIENT)
Dept: INTERNAL MEDICINE CLINIC | Facility: CLINIC | Age: 50
End: 2020-02-06

## 2020-03-17 PROBLEM — G57.61 MORTON NEUROMA, RIGHT: Status: ACTIVE | Noted: 2020-03-17

## 2020-03-24 ENCOUNTER — HOSPITAL ENCOUNTER (OUTPATIENT)
Dept: CT IMAGING | Facility: HOSPITAL | Age: 50
Discharge: HOME | End: 2020-03-24
Attending: UROLOGY
Payer: COMMERCIAL

## 2020-03-24 DIAGNOSIS — Z85.520 PERSONAL HISTORY OF MALIGNANT CARCINOID TUMOR OF KIDNEY: ICD-10-CM

## 2020-03-24 LAB — CREAT BLD-MCNC: 1 MG/DL (ref 0.55–1.02)

## 2020-03-24 PROCEDURE — 82565 ASSAY OF CREATININE: CPT

## 2020-03-24 PROCEDURE — 74160 CT ABDOMEN W/CONTRAST: CPT | Performed by: UROLOGY

## 2020-04-04 RX ORDER — SPIRONOLACTONE 100 MG/1
TABLET, FILM COATED ORAL
Qty: 135 TABLET | Refills: 1 | Status: SHIPPED | OUTPATIENT
Start: 2020-04-04 | End: 2020-09-30

## 2020-04-04 RX ORDER — ATENOLOL 25 MG/1
TABLET ORAL
Qty: 90 TABLET | Refills: 1 | Status: SHIPPED | OUTPATIENT
Start: 2020-04-04 | End: 2020-10-07

## 2020-05-08 RX ORDER — LEVOTHYROXINE SODIUM 0.1 MG/1
TABLET ORAL
Qty: 90 TABLET | Refills: 1 | Status: SHIPPED | OUTPATIENT
Start: 2020-05-08 | End: 2020-11-14

## 2020-05-13 RX ORDER — CANDESARTAN 8 MG/1
TABLET ORAL
Qty: 90 TABLET | Refills: 0 | Status: SHIPPED | OUTPATIENT
Start: 2020-05-13 | End: 2020-06-09

## 2020-06-07 ENCOUNTER — TELEPHONE (OUTPATIENT)
Dept: INTERNAL MEDICINE CLINIC | Facility: CLINIC | Age: 50
End: 2020-06-07

## 2020-06-09 ENCOUNTER — OFFICE VISIT (OUTPATIENT)
Dept: INTERNAL MEDICINE CLINIC | Facility: CLINIC | Age: 50
End: 2020-06-09
Payer: COMMERCIAL

## 2020-06-09 ENCOUNTER — TELEPHONE (OUTPATIENT)
Dept: INTERNAL MEDICINE CLINIC | Facility: CLINIC | Age: 50
End: 2020-06-09

## 2020-06-09 VITALS
BODY MASS INDEX: 39.53 KG/M2 | OXYGEN SATURATION: 98 % | TEMPERATURE: 98 F | DIASTOLIC BLOOD PRESSURE: 80 MMHG | HEIGHT: 66 IN | WEIGHT: 246 LBS | SYSTOLIC BLOOD PRESSURE: 142 MMHG | RESPIRATION RATE: 18 BRPM | HEART RATE: 62 BPM

## 2020-06-09 DIAGNOSIS — E03.9 ACQUIRED HYPOTHYROIDISM: ICD-10-CM

## 2020-06-09 DIAGNOSIS — E55.9 VITAMIN D DEFICIENCY: ICD-10-CM

## 2020-06-09 DIAGNOSIS — I10 ESSENTIAL HYPERTENSION: Primary | ICD-10-CM

## 2020-06-09 DIAGNOSIS — C64.1 RENAL CELL CARCINOMA OF RIGHT KIDNEY (HCC): ICD-10-CM

## 2020-06-09 DIAGNOSIS — Z12.11 COLON CANCER SCREENING: ICD-10-CM

## 2020-06-09 DIAGNOSIS — G47.00 INSOMNIA, UNSPECIFIED TYPE: ICD-10-CM

## 2020-06-09 DIAGNOSIS — E11.9 TYPE 2 DIABETES MELLITUS WITHOUT COMPLICATION, WITHOUT LONG-TERM CURRENT USE OF INSULIN (HCC): ICD-10-CM

## 2020-06-09 PROCEDURE — 99214 OFFICE O/P EST MOD 30 MIN: CPT | Performed by: INTERNAL MEDICINE

## 2020-06-09 RX ORDER — CHLORHEXIDINE GLUCONATE 4 G/100ML
30 SOLUTION TOPICAL
Qty: 210 ML | Refills: 0 | Status: SHIPPED | OUTPATIENT
Start: 2020-06-09 | End: 2020-06-16

## 2020-06-09 RX ORDER — ZOLPIDEM TARTRATE 5 MG/1
5 TABLET ORAL NIGHTLY PRN
Qty: 15 TABLET | Refills: 0 | Status: SHIPPED | OUTPATIENT
Start: 2020-06-09

## 2020-06-09 RX ORDER — CANDESARTAN 8 MG/1
8 TABLET ORAL 2 TIMES DAILY
Qty: 180 TABLET | Refills: 1 | Status: ON HOLD | OUTPATIENT
Start: 2020-06-09 | End: 2020-12-28

## 2020-06-09 NOTE — TELEPHONE ENCOUNTER
[facility] spoke to [facility] [employer] [facility] [employer] she couldn't find prescription so she transferred me to [facility] and she was able to cancel Chlorhexidine.

## 2020-06-09 NOTE — PATIENT INSTRUCTIONS
ASSESSMENT/PLAN:   Essential hypertension  (primary encounter diagnosis) Not good. Increase candesartan 8 mg every 12 hrs. Call in 10 days. Careful with diet and excercise at least 30 minutes 3-4 times a week.  Check blood pressures at different times on [facility] by mouth 2 (two) times daily. • zolpidem (AMBIEN) 5 MG Oral Tab 15 tablet 0     Sig: Take 1 tablet (5 mg total) by mouth nightly as needed for Sleep. Imaging & Referrals:  None     RTC for physical in 8-2 0.

## 2020-06-09 NOTE — TELEPHONE ENCOUNTER
Return in about 3 months (around 8/28/2020), or if symptoms worsen or fail to improve. Patient prefers Tuesday later in the day and will need physical.   Please advise time and day.

## 2020-06-09 NOTE — PROGRESS NOTES
HPI:    Patient ID: Jodee Saleh is a 44year old female. Patient presents with: Follow - Up: hypertension, diabetes     Working from home. Patient here for follow up of Diabetes. Has been taking medications regularly.     Checks sugars 1 jewels lb (111.1 kg)    BP Readings from Last 3 Encounters:  06/09/20 : 142/80  05/26/20 : 145/80  02/05/20 : 128/84    Labs:   Lab Results   Component Value Date/Time     (H) 06/10/2020 09:28 AM     06/10/2020 09:28 AM    K 4.5 06/10/2020 09:28 AM Endocrine: Negative for cold intolerance, heat intolerance, polydipsia, polyphagia and polyuria. Neurological: Negative for dizziness, tremors, seizures, syncope, weakness, light-headedness, numbness and headaches.    Psychiatric/Behavioral: Positive fo UNIT/ML Subcutaneous Solution Pen-injector Inject 11 Units into the skin nightly.  3 pen 1   • Clindamycin Phosphate 1 % External Foam apply to affected areas on the back BID PRN for flares     • famotidine 10 MG Oral Tab Take 1 tablet (10 mg total) by rhonda Skin cancer.     • Arthritis Mother    • Cancer Mother         lung, skin and MDS   • Hypertension Mother    • Heart Disorder Mother 61        CAD S/P MI.    • Cancer Brother         Hodgkin's   • Hypertension Brother    • Cancer Maternal Grandmother bradypnea. She is not intubated. No respiratory distress. She has no decreased breath sounds. She has no wheezes. She has no rhonchi. She has no rales. She exhibits no tenderness. Abdominal: Soft. There is no tenderness.    Musculoskeletal:         Anna Teran Insomnia, unspecified type Sleep hygiene. Ambien as needed.      Orders Placed This Encounter      Lipid Panel [E]      Comp Metabolic Panel (14) [E]      Hemoglobin A1C [E]      Microalb/Creat Ratio, Random Urine      Vitamin D, 25-Hydroxy [E]      Hem

## 2020-06-10 ENCOUNTER — LAB ENCOUNTER (OUTPATIENT)
Dept: LAB | Facility: REFERENCE LAB | Age: 50
End: 2020-06-10
Attending: INTERNAL MEDICINE
Payer: COMMERCIAL

## 2020-06-10 DIAGNOSIS — E55.9 VITAMIN D DEFICIENCY: ICD-10-CM

## 2020-06-10 DIAGNOSIS — E11.9 TYPE 2 DIABETES MELLITUS WITHOUT COMPLICATION, WITHOUT LONG-TERM CURRENT USE OF INSULIN (HCC): ICD-10-CM

## 2020-06-10 PROCEDURE — 36415 COLL VENOUS BLD VENIPUNCTURE: CPT

## 2020-06-10 PROCEDURE — 82570 ASSAY OF URINE CREATININE: CPT

## 2020-06-10 PROCEDURE — 82043 UR ALBUMIN QUANTITATIVE: CPT

## 2020-06-10 PROCEDURE — 80061 LIPID PANEL: CPT

## 2020-06-10 PROCEDURE — 80053 COMPREHEN METABOLIC PANEL: CPT

## 2020-06-10 PROCEDURE — 82306 VITAMIN D 25 HYDROXY: CPT

## 2020-06-10 PROCEDURE — 83036 HEMOGLOBIN GLYCOSYLATED A1C: CPT

## 2020-07-17 NOTE — TELEPHONE ENCOUNTER
Coretta Agudelo is a 3 year old female presenting for a bug bite recheck. Pt has received zyrtec 10 mg the last three days. Last dose at 10:30-11:00 am.     Concerns: None      Denies known Latex allergy or symptoms of Latex sensitivity.  Medications reviewed and updated.              Pt notified of results and recommendations. Verbalizes understanding. Following up on Lisinopril, since stopping cough has resolved. Do you wish to prescribe a different medication? Or just conitnue to monitor BP and kidney fxn? [id number] [address] please advise.

## 2020-08-05 RX ORDER — AMLODIPINE BESYLATE 2.5 MG/1
TABLET ORAL
Qty: 180 TABLET | Refills: 1 | Status: SHIPPED | OUTPATIENT
Start: 2020-08-05 | End: 2021-01-23

## 2020-08-18 ENCOUNTER — OFFICE VISIT (OUTPATIENT)
Dept: INTERNAL MEDICINE CLINIC | Facility: CLINIC | Age: 50
End: 2020-08-18
Payer: COMMERCIAL

## 2020-08-18 ENCOUNTER — TELEPHONE (OUTPATIENT)
Dept: INTERNAL MEDICINE CLINIC | Facility: CLINIC | Age: 50
End: 2020-08-18

## 2020-08-18 VITALS
HEIGHT: 66 IN | WEIGHT: 246.5 LBS | DIASTOLIC BLOOD PRESSURE: 58 MMHG | OXYGEN SATURATION: 98 % | BODY MASS INDEX: 39.62 KG/M2 | TEMPERATURE: 98 F | HEART RATE: 60 BPM | SYSTOLIC BLOOD PRESSURE: 130 MMHG

## 2020-08-18 DIAGNOSIS — E55.9 VITAMIN D DEFICIENCY: ICD-10-CM

## 2020-08-18 DIAGNOSIS — E04.1 THYROID NODULE: ICD-10-CM

## 2020-08-18 DIAGNOSIS — R82.90 ABNORMAL URINE: ICD-10-CM

## 2020-08-18 DIAGNOSIS — E11.9 TYPE 2 DIABETES MELLITUS WITHOUT COMPLICATION, WITHOUT LONG-TERM CURRENT USE OF INSULIN (HCC): ICD-10-CM

## 2020-08-18 DIAGNOSIS — I10 ESSENTIAL HYPERTENSION: Primary | ICD-10-CM

## 2020-08-18 DIAGNOSIS — C64.1 RENAL CELL CARCINOMA OF RIGHT KIDNEY (HCC): ICD-10-CM

## 2020-08-18 DIAGNOSIS — Z00.00 ROUTINE MEDICAL EXAM: ICD-10-CM

## 2020-08-18 LAB
APPEARANCE: CLEAR
BACTERIA UR QL AUTO: NEGATIVE /HPF
BILIRUB UR QL: NEGATIVE
BILIRUBIN: NEGATIVE
CLARITY UR: CLEAR
COLOR UR: YELLOW
GLUCOSE (URINE DIPSTICK): >=1000 MG/DL
GLUCOSE UR-MCNC: >=500 MG/DL
KETONES (URINE DIPSTICK): NEGATIVE MG/DL
KETONES UR-MCNC: NEGATIVE MG/DL
LEUKOCYTE ESTERASE UR QL STRIP.AUTO: NEGATIVE
LEUKOCYTES: NEGATIVE
NITRITE UR QL STRIP.AUTO: NEGATIVE
NITRITE, URINE: NEGATIVE
PH UR: 5 [PH] (ref 5–8)
PH, URINE: 5.5 (ref 4.5–8)
PROT UR-MCNC: NEGATIVE MG/DL
PROTEIN (URINE DIPSTICK): NEGATIVE MG/DL
RBC #/AREA URNS AUTO: <1 /HPF
SP GR UR STRIP: 1.03 (ref 1–1.03)
SPECIFIC GRAVITY: 1.02 (ref 1–1.03)
URINE-COLOR: YELLOW
UROBILINOGEN UR STRIP-ACNC: <2
UROBILINOGEN,SEMI-QN: 0.2 MG/DL (ref 0–1.9)
WBC #/AREA URNS AUTO: 2 /HPF

## 2020-08-18 PROCEDURE — 81003 URINALYSIS AUTO W/O SCOPE: CPT | Performed by: INTERNAL MEDICINE

## 2020-08-18 PROCEDURE — 3008F BODY MASS INDEX DOCD: CPT | Performed by: INTERNAL MEDICINE

## 2020-08-18 PROCEDURE — 99396 PREV VISIT EST AGE 40-64: CPT | Performed by: INTERNAL MEDICINE

## 2020-08-18 PROCEDURE — 3075F SYST BP GE 130 - 139MM HG: CPT | Performed by: INTERNAL MEDICINE

## 2020-08-18 PROCEDURE — 3078F DIAST BP <80 MM HG: CPT | Performed by: INTERNAL MEDICINE

## 2020-08-19 NOTE — PATIENT INSTRUCTIONS
ASSESSMENT/PLAN:   Essential hypertension  (primary encounter diagnosis) Good control. Careful with diet and excercise at least 30 minutes 3-4 times a week. Check blood pressures at different times on different days.  Can purchase own blood pressure mo

## 2020-08-19 NOTE — TELEPHONE ENCOUNTER
3:45 PM on December 3, 2020 at [facility]. For follow-up hypertension.

## 2020-08-19 NOTE — PROGRESS NOTES
HPI:    Patient ID: Ashutosh Coe is a 44year old female. Ashutosh Coe is a 44year old female who presents for a complete physical exam.   HPI:   Patient presents with:  Physical       Patient's last menstrual period was 06/15/2020.    Sympt TWICE A  tablet 1   • Candesartan Cilexetil 8 MG Oral Tab Take 1 tablet (8 mg total) by mouth 2 (two) times daily. 180 tablet 1   • zolpidem (AMBIEN) 5 MG Oral Tab Take 1 tablet (5 mg total) by mouth nightly as needed for Sleep.  15 tablet 0   • LEVO CORRECT ADRY MARSHALL METHODS Right    • CYSTOSCOPY RETROGRADE Right 2/4/2019    Performed by Angeles Bryan MD at [address] [place] [address] MAIN OR   • [place] Right 1/10/2019    Performed by Angeles Bryan MD at [address] [place] [address] OR   • [address] [place] [address] 6/10/2020 6/9/2020 6/9/2020   BMI (kg/m2) - 39.79 - - 39.71   Hgb A1c <5.7 % - 6. 9(H) - -   Cholesterol Total <200 mg/dL - 167 - -   Triglycerides 30 - 149 mg/dL - 165(H) - -   HDL 40 - 59 mg/dL - 44 - -   LDL <100 mg/dL - 90 - -   Microalbumin Urine mg/dL 167 06/10/2020 09:28 AM    HDL 44 06/10/2020 09:28 AM    TRIG 165 (H) 06/10/2020 09:28 AM    LDL 90 06/10/2020 09:28 AM    NONHDLC 123 06/10/2020 09:28 AM           Wt Readings from Last 3 Encounters:  08/18/20 : 246 lb 8 oz (111.8 kg)  06/09/20 : 246 lb ( polydipsia, polyphagia and polyuria. Genitourinary: Negative for decreased urine volume, difficulty urinating, dysuria, flank pain, frequency, hematuria, menstrual problem, pelvic pain, urgency, vaginal bleeding, vaginal discharge and vaginal pain.    Ski Units into the skin nightly. 3 pen 1   • Clindamycin Phosphate 1 % External Foam apply to affected areas on the back BID PRN for flares     • famotidine 10 MG Oral Tab Take 1 tablet (10 mg total) by mouth 2 (two) times daily.  60 tablet 1   • Multiple Vitam Cancer Mother         lung, skin and MDS   • Hypertension Mother    • Heart Disorder Mother 61        CAD S/P MI.    • Cancer Brother         Hodgkin's   • Hypertension Brother    • Cancer Maternal Grandmother         Hepatoma.     • Cancer Paternal Uncle and not bulging. Tympanic membrane mobility is normal.  No middle ear effusion. No hemotympanum. No decreased hearing is noted. Nose: Nose normal. No mucosal edema, rhinorrhea, nose lacerations, sinus tenderness, nasal deformity or nasal septal hematoma. Radial pulses are 2+ on the right side and 2+ on the left side. Dorsalis pedis pulses are 2+ on the right side and 2+ on the left side. Posterior tibial pulses are 2+ on the right side and 2+ on the left side.    Pulmonary/Chest: Effort no supraclavicular adenopathy present. Left: No supraclavicular adenopathy present. Neurological: She is alert and oriented to person, place, and time. Skin: Skin is warm and dry. No rash noted. She is not diaphoretic. No erythema. No pallor.    Psy Check blood and U/S.      Orders Placed This Encounter      POC Urinalysis, Automated Dip without microscopy ([facility] and [facility] ONLY) [82971]      Urinalysis, Routine [E]      Urine Culture, Routine [E]      Meds This Visit:  Requested Prescriptions     Signed P

## 2020-08-19 NOTE — TELEPHONE ENCOUNTER
RTC 3 months for follow up DM.     Patient prefers [address] [facility] office late afternoon or evening     Please advise time and day

## 2020-09-14 ENCOUNTER — LAB ENCOUNTER (OUTPATIENT)
Dept: LAB | Facility: HOSPITAL | Age: 50
End: 2020-09-14
Attending: INTERNAL MEDICINE
Payer: COMMERCIAL

## 2020-09-14 DIAGNOSIS — I10 ESSENTIAL HYPERTENSION: ICD-10-CM

## 2020-09-14 DIAGNOSIS — E11.9 TYPE 2 DIABETES MELLITUS WITHOUT COMPLICATION, WITHOUT LONG-TERM CURRENT USE OF INSULIN (HCC): Primary | ICD-10-CM

## 2020-09-14 DIAGNOSIS — E11.9 TYPE 2 DIABETES MELLITUS WITHOUT COMPLICATION, WITHOUT LONG-TERM CURRENT USE OF INSULIN (HCC): ICD-10-CM

## 2020-09-14 LAB
ALBUMIN SERPL-MCNC: 3.4 G/DL (ref 3.4–5)
ALBUMIN/GLOB SERPL: 0.9 {RATIO} (ref 1–2)
ALP LIVER SERPL-CCNC: 66 U/L (ref 41–108)
ALT SERPL-CCNC: 73 U/L (ref 13–56)
ANION GAP SERPL CALC-SCNC: 6 MMOL/L (ref 0–18)
AST SERPL-CCNC: 43 U/L (ref 15–37)
BILIRUB SERPL-MCNC: 0.6 MG/DL (ref 0.1–2)
BUN BLD-MCNC: 17 MG/DL (ref 7–18)
BUN/CREAT SERPL: 16 (ref 10–20)
CALCIUM BLD-MCNC: 8.9 MG/DL (ref 8.5–10.1)
CHLORIDE SERPL-SCNC: 109 MMOL/L (ref 98–112)
CHOLEST SMN-MCNC: 153 MG/DL (ref ?–200)
CO2 SERPL-SCNC: 24 MMOL/L (ref 21–32)
CREAT BLD-MCNC: 1.06 MG/DL (ref 0.55–1.02)
EST. AVERAGE GLUCOSE BLD GHB EST-MCNC: 157 MG/DL (ref 68–126)
GLOBULIN PLAS-MCNC: 3.7 G/DL (ref 2.8–4.4)
GLUCOSE BLD-MCNC: 149 MG/DL (ref 70–99)
HBA1C MFR BLD HPLC: 7.1 % (ref ?–5.7)
HDLC SERPL-MCNC: 53 MG/DL (ref 40–59)
LDLC SERPL CALC-MCNC: 78 MG/DL (ref ?–100)
M PROTEIN MFR SERPL ELPH: 7.1 G/DL (ref 6.4–8.2)
NONHDLC SERPL-MCNC: 100 MG/DL (ref ?–130)
OSMOLALITY SERPL CALC.SUM OF ELEC: 292 MOSM/KG (ref 275–295)
PATIENT FASTING Y/N/NP: YES
PATIENT FASTING Y/N/NP: YES
POTASSIUM SERPL-SCNC: 4.1 MMOL/L (ref 3.5–5.1)
SODIUM SERPL-SCNC: 139 MMOL/L (ref 136–145)
TRIGL SERPL-MCNC: 110 MG/DL (ref 30–149)
VLDLC SERPL CALC-MCNC: 22 MG/DL (ref 0–30)

## 2020-09-14 PROCEDURE — 80061 LIPID PANEL: CPT

## 2020-09-14 PROCEDURE — 83036 HEMOGLOBIN GLYCOSYLATED A1C: CPT

## 2020-09-14 PROCEDURE — 80053 COMPREHEN METABOLIC PANEL: CPT

## 2020-09-14 PROCEDURE — 36415 COLL VENOUS BLD VENIPUNCTURE: CPT

## 2020-09-17 ENCOUNTER — HOSPITAL ENCOUNTER (OUTPATIENT)
Dept: ULTRASOUND IMAGING | Facility: HOSPITAL | Age: 50
Discharge: HOME | End: 2020-09-17
Attending: INTERNAL MEDICINE
Payer: COMMERCIAL

## 2020-09-17 DIAGNOSIS — E04.1 THYROID NODULE: ICD-10-CM

## 2020-09-17 PROCEDURE — 76536 US EXAM OF HEAD AND NECK: CPT | Performed by: INTERNAL MEDICINE

## 2020-09-30 RX ORDER — SPIRONOLACTONE 100 MG/1
TABLET, FILM COATED ORAL
Qty: 135 TABLET | Refills: 1 | Status: SHIPPED | OUTPATIENT
Start: 2020-09-30 | End: 2021-03-29

## 2020-10-05 ENCOUNTER — HOSPITAL ENCOUNTER (OUTPATIENT)
Dept: CT IMAGING | Facility: HOSPITAL | Age: 50
Discharge: HOME | End: 2020-10-05
Attending: UROLOGY
Payer: COMMERCIAL

## 2020-10-05 ENCOUNTER — LAB ENCOUNTER (OUTPATIENT)
Dept: LAB | Facility: HOSPITAL | Age: 50
End: 2020-10-05
Attending: UROLOGY
Payer: COMMERCIAL

## 2020-10-05 DIAGNOSIS — Z85.520: ICD-10-CM

## 2020-10-05 DIAGNOSIS — Z85.520: Primary | ICD-10-CM

## 2020-10-05 PROCEDURE — 74160 CT ABDOMEN W/CONTRAST: CPT | Performed by: UROLOGY

## 2020-10-05 PROCEDURE — 36415 COLL VENOUS BLD VENIPUNCTURE: CPT

## 2020-10-05 PROCEDURE — 80053 COMPREHEN METABOLIC PANEL: CPT

## 2020-10-05 PROCEDURE — 85025 COMPLETE CBC W/AUTO DIFF WBC: CPT

## 2020-10-07 RX ORDER — ATENOLOL 25 MG/1
25 TABLET ORAL NIGHTLY
Qty: 90 TABLET | Refills: 1 | Status: SHIPPED | OUTPATIENT
Start: 2020-10-07 | End: 2021-03-14

## 2020-10-18 RX ORDER — INSULIN DETEMIR 100 [IU]/ML
11 INJECTION, SOLUTION SUBCUTANEOUS NIGHTLY
Qty: 45 ML | Refills: 2 | Status: SHIPPED | OUTPATIENT
Start: 2020-10-18 | End: 2020-12-03

## 2020-10-30 ENCOUNTER — HOSPITAL ENCOUNTER (OUTPATIENT)
Dept: NUCLEAR MEDICINE | Facility: HOSPITAL | Age: 50
Discharge: HOME | End: 2020-10-30
Attending: UROLOGY
Payer: COMMERCIAL

## 2020-10-30 DIAGNOSIS — N13.30 HYDRONEPHROSIS OF RIGHT KIDNEY: ICD-10-CM

## 2020-10-30 DIAGNOSIS — Z90.5 STATUS POST NEPHRECTOMY: ICD-10-CM

## 2020-10-30 PROCEDURE — 78708 K FLOW/FUNCT IMAGE W/DRUG: CPT | Performed by: UROLOGY

## 2020-10-30 RX ORDER — FUROSEMIDE 10 MG/ML
40 INJECTION INTRAMUSCULAR; INTRAVENOUS ONCE
Status: COMPLETED | OUTPATIENT
Start: 2020-10-30 | End: 2020-10-30

## 2020-10-30 RX ORDER — FUROSEMIDE 10 MG/ML
INJECTION INTRAMUSCULAR; INTRAVENOUS
Status: COMPLETED
Start: 2020-10-30 | End: 2020-10-30

## 2020-10-30 RX ADMIN — FUROSEMIDE 40 MG: 10 INJECTION INTRAMUSCULAR; INTRAVENOUS at 10:51:00

## 2020-11-01 ENCOUNTER — HOSPITAL ENCOUNTER (OUTPATIENT)
Age: 50
Discharge: HOME | End: 2020-11-01
Payer: COMMERCIAL

## 2020-11-01 VITALS
OXYGEN SATURATION: 96 % | SYSTOLIC BLOOD PRESSURE: 141 MMHG | TEMPERATURE: 97 F | HEART RATE: 65 BPM | RESPIRATION RATE: 16 BRPM | BODY MASS INDEX: 38.57 KG/M2 | WEIGHT: 240 LBS | HEIGHT: 66 IN | DIASTOLIC BLOOD PRESSURE: 79 MMHG

## 2020-11-01 DIAGNOSIS — J98.8 VIRAL RESPIRATORY INFECTION: Primary | ICD-10-CM

## 2020-11-01 DIAGNOSIS — Z20.822 ENCOUNTER FOR LABORATORY TESTING FOR COVID-19 VIRUS: ICD-10-CM

## 2020-11-01 DIAGNOSIS — B97.89 VIRAL RESPIRATORY INFECTION: Primary | ICD-10-CM

## 2020-11-01 PROCEDURE — U0003 INFECTIOUS AGENT DETECTION BY NUCLEIC ACID (DNA OR RNA); SEVERE ACUTE RESPIRATORY SYNDROME CORONAVIRUS 2 (SARS-COV-2) (CORONAVIRUS DISEASE [COVID-19]), AMPLIFIED PROBE TECHNIQUE, MAKING USE OF HIGH THROUGHPUT TECHNOLOGIES AS DESCRIBED BY CMS-2020-01-R: HCPCS | Performed by: NURSE PRACTITIONER

## 2020-11-01 PROCEDURE — 99202 OFFICE O/P NEW SF 15 MIN: CPT | Performed by: NURSE PRACTITIONER

## 2020-11-01 NOTE — ED INITIAL ASSESSMENT (HPI)
Patient states she had a positive exposure to COVID x 8 days ago, states having fatigue, headache. Patient denies loss of taste or smell.

## 2020-11-01 NOTE — ED PROVIDER NOTES
Patient Seen in: [facility]    History   CC: covid testing  HPI: Louann Viky 44year old female  who presents requesting Covid testing 9 days post exposure to known positive contact.   Patient states 4 days with mild fatigue and mild, du • Arthritis Mother    • Cancer Mother         lung, skin and MDS   • Hypertension Mother    • Heart Disorder Mother 61        CAD S/P MI.    • Cancer Brother         Hodgkin's   • Hypertension Brother    • Cancer Maternal Grandmother         Hepatoma. by mouth daily. Olopatadine HCl 0.1 % Ophthalmic Solution,  Place 1 drop into both eyes 2 (two) times daily as needed.      Clindamycin Phosphate 1 % External Foam,  apply to affected areas on the back BID PRN for flares           Constitutional and vit so they can quarantine/test as well. Pt demonstrates understanding of information and agrees w/ poc.       Disposition and Plan     Clinical Impression:  Viral respiratory infection  (primary encounter diagnosis)  Encounter for laboratory testing for COVID

## 2020-11-14 RX ORDER — LEVOTHYROXINE SODIUM 0.1 MG/1
TABLET ORAL
Qty: 90 TABLET | Refills: 1 | Status: SHIPPED | OUTPATIENT
Start: 2020-11-14 | End: 2021-02-15

## 2020-12-03 ENCOUNTER — OFFICE VISIT (OUTPATIENT)
Dept: INTERNAL MEDICINE CLINIC | Facility: CLINIC | Age: 50
End: 2020-12-03
Payer: COMMERCIAL

## 2020-12-03 VITALS
DIASTOLIC BLOOD PRESSURE: 72 MMHG | HEIGHT: 66 IN | RESPIRATION RATE: 18 BRPM | SYSTOLIC BLOOD PRESSURE: 122 MMHG | HEART RATE: 59 BPM | WEIGHT: 247.19 LBS | BODY MASS INDEX: 39.73 KG/M2 | OXYGEN SATURATION: 98 % | TEMPERATURE: 97 F

## 2020-12-03 DIAGNOSIS — I10 ESSENTIAL HYPERTENSION: Primary | ICD-10-CM

## 2020-12-03 DIAGNOSIS — E11.9 TYPE 2 DIABETES MELLITUS WITHOUT COMPLICATION, WITHOUT LONG-TERM CURRENT USE OF INSULIN (HCC): ICD-10-CM

## 2020-12-03 DIAGNOSIS — E55.9 VITAMIN D DEFICIENCY: ICD-10-CM

## 2020-12-03 PROCEDURE — 3074F SYST BP LT 130 MM HG: CPT | Performed by: INTERNAL MEDICINE

## 2020-12-03 PROCEDURE — 3008F BODY MASS INDEX DOCD: CPT | Performed by: INTERNAL MEDICINE

## 2020-12-03 PROCEDURE — 3078F DIAST BP <80 MM HG: CPT | Performed by: INTERNAL MEDICINE

## 2020-12-03 PROCEDURE — 99214 OFFICE O/P EST MOD 30 MIN: CPT | Performed by: INTERNAL MEDICINE

## 2020-12-03 RX ORDER — INSULIN DETEMIR 100 [IU]/ML
17 INJECTION, SOLUTION SUBCUTANEOUS NIGHTLY
Qty: 45 ML | Refills: 2 | Status: SHIPPED | OUTPATIENT
Start: 2020-12-03 | End: 2021-03-23

## 2020-12-03 NOTE — PROGRESS NOTES
HPI:    Patient ID: Armando Daly is a 44year old female. Patient presents with:  Hypertension: follow up   Saw urology. Has ashleigh't 12-. Patient here for follow up of Diabetes. Has been taking medications regularly.     Checks sugars 1 t 3.9 10/05/2020 07:18 AM     10/05/2020 07:18 AM    CO2 24.0 10/05/2020 07:18 AM    CREATSERUM 0.99 10/05/2020 07:18 AM    CA 9.0 10/05/2020 07:18 AM    AST 42 (H) 10/05/2020 07:18 AM    ALT 79 (H) 10/05/2020 07:18 AM    TSH 1.560 03/28/2019 06:57 AM other systems reviewed and are negative. Current Outpatient Medications   Medication Sig Dispense Refill   • insulin detemir (LEVEMIR FLEXTOUCH) 100 UNIT/ML Subcutaneous Solution Pen-injector Inject 17 Units into the skin nightly.  45 mL 2   • Levoth APPENDECTOMY     • ARTHROSCOPY OF JOINT UNLISTED Left 1-4-16    Labral tear reapir, iliopsoas burscetomy.  (hip)   • ARTHROSCOPY OF JOINT UNLISTED Right     bone spur and torn cartilage shoulder   • CARPAL TUNNEL RELEASE Right 2014   • CARPAL TUNNEL RELEASE Last 3 Encounters:  12/03/20 : 122/72  11/01/20 : 141/79  08/18/20 : 130/58    Wt Readings from Last 3 Encounters:  12/03/20 : 247 lb 3.2 oz (112.1 kg)  11/01/20 : 240 lb (108.9 kg)  08/18/20 : 246 lb 8 oz (111.8 kg)      Physical Exam   Constitutional: Sh monitor. If not, check at local pharmacy. Bake foods more and grill occasionally. Avoid fried foods. No salt. Use other seasonings. Type 2 diabetes mellitus without complication, without long-term current use of insulin (hcc) Not good control.  Increase

## 2020-12-22 ENCOUNTER — LAB ENCOUNTER (OUTPATIENT)
Dept: LAB | Facility: HOSPITAL | Age: 50
End: 2020-12-22
Attending: INTERNAL MEDICINE
Payer: COMMERCIAL

## 2020-12-22 DIAGNOSIS — E11.9 TYPE 2 DIABETES MELLITUS WITHOUT COMPLICATION, WITHOUT LONG-TERM CURRENT USE OF INSULIN (HCC): ICD-10-CM

## 2020-12-22 PROCEDURE — 80053 COMPREHEN METABOLIC PANEL: CPT

## 2020-12-22 PROCEDURE — 36415 COLL VENOUS BLD VENIPUNCTURE: CPT

## 2020-12-22 PROCEDURE — 80061 LIPID PANEL: CPT

## 2020-12-22 PROCEDURE — 83036 HEMOGLOBIN GLYCOSYLATED A1C: CPT

## 2020-12-22 RX ORDER — TRIAMCINOLONE ACETONIDE 40 MG/ML
40 INJECTION, SUSPENSION INTRA-ARTICULAR; INTRAMUSCULAR ONCE
Status: DISCONTINUED | OUTPATIENT
Start: 2020-12-28 | End: 2020-12-28

## 2020-12-28 ENCOUNTER — APPOINTMENT (OUTPATIENT)
Dept: GENERAL RADIOLOGY | Facility: HOSPITAL | Age: 50
End: 2020-12-28
Attending: UROLOGY
Payer: COMMERCIAL

## 2020-12-28 ENCOUNTER — ANESTHESIA EVENT (OUTPATIENT)
Dept: SURGERY | Facility: HOSPITAL | Age: 50
End: 2020-12-28
Payer: COMMERCIAL

## 2020-12-28 ENCOUNTER — ANESTHESIA (OUTPATIENT)
Dept: SURGERY | Facility: HOSPITAL | Age: 50
End: 2020-12-28
Payer: COMMERCIAL

## 2020-12-28 ENCOUNTER — HOSPITAL ENCOUNTER (OUTPATIENT)
Facility: HOSPITAL | Age: 50
Setting detail: HOSPITAL OUTPATIENT SURGERY
Discharge: HOME | End: 2020-12-28
Attending: UROLOGY | Admitting: UROLOGY
Payer: COMMERCIAL

## 2020-12-28 VITALS
RESPIRATION RATE: 16 BRPM | WEIGHT: 243 LBS | OXYGEN SATURATION: 93 % | SYSTOLIC BLOOD PRESSURE: 147 MMHG | TEMPERATURE: 98 F | HEART RATE: 67 BPM | HEIGHT: 66 IN | DIASTOLIC BLOOD PRESSURE: 78 MMHG | BODY MASS INDEX: 39.05 KG/M2

## 2020-12-28 PROCEDURE — 0T768DZ DILATION OF RIGHT URETER WITH INTRALUMINAL DEVICE, VIA NATURAL OR ARTIFICIAL OPENING ENDOSCOPIC: ICD-10-PCS | Performed by: UROLOGY

## 2020-12-28 PROCEDURE — 82962 GLUCOSE BLOOD TEST: CPT

## 2020-12-28 PROCEDURE — BT1D1ZZ FLUOROSCOPY OF RIGHT KIDNEY, URETER AND BLADDER USING LOW OSMOLAR CONTRAST: ICD-10-PCS | Performed by: UROLOGY

## 2020-12-28 PROCEDURE — 81025 URINE PREGNANCY TEST: CPT

## 2020-12-28 RX ORDER — SCOLOPAMINE TRANSDERMAL SYSTEM 1 MG/1
1 PATCH, EXTENDED RELEASE TRANSDERMAL
Status: DISCONTINUED | OUTPATIENT
Start: 2020-12-28 | End: 2020-12-28 | Stop reason: HOSPADM

## 2020-12-28 RX ORDER — CEFAZOLIN SODIUM/WATER 2 G/20 ML
2 SYRINGE (ML) INTRAVENOUS ONCE
Status: COMPLETED | OUTPATIENT
Start: 2020-12-28 | End: 2020-12-28

## 2020-12-28 RX ORDER — DEXTROSE MONOHYDRATE 25 G/50ML
50 INJECTION, SOLUTION INTRAVENOUS
Status: DISCONTINUED | OUTPATIENT
Start: 2020-12-28 | End: 2020-12-28

## 2020-12-28 RX ORDER — OXYBUTYNIN CHLORIDE 5 MG/1
5 TABLET, EXTENDED RELEASE ORAL DAILY
Qty: 30 TABLET | Refills: 0 | Status: SHIPPED | OUTPATIENT
Start: 2020-12-28 | End: 2021-03-11 | Stop reason: ALTCHOICE

## 2020-12-28 RX ORDER — HYDROCODONE BITARTRATE AND ACETAMINOPHEN 5; 325 MG/1; MG/1
1 TABLET ORAL EVERY 4 HOURS PRN
Status: CANCELLED | OUTPATIENT
Start: 2020-12-28

## 2020-12-28 RX ORDER — PHENAZOPYRIDINE HYDROCHLORIDE 95 MG/1
95 TABLET ORAL 3 TIMES DAILY PRN
Qty: 21 TABLET | Refills: 6 | Status: SHIPPED | OUTPATIENT
Start: 2020-12-28 | End: 2021-03-11

## 2020-12-28 RX ORDER — HYDROCODONE BITARTRATE AND ACETAMINOPHEN 5; 325 MG/1; MG/1
2 TABLET ORAL AS NEEDED
Status: DISCONTINUED | OUTPATIENT
Start: 2020-12-28 | End: 2020-12-28

## 2020-12-28 RX ORDER — HYDROCODONE BITARTRATE AND ACETAMINOPHEN 5; 325 MG/1; MG/1
2 TABLET ORAL EVERY 4 HOURS PRN
Status: CANCELLED | OUTPATIENT
Start: 2020-12-28

## 2020-12-28 RX ORDER — CANDESARTAN 8 MG/1
8 TABLET ORAL 2 TIMES DAILY
Qty: 180 TABLET | Refills: 1 | Status: SHIPPED | OUTPATIENT
Start: 2020-12-28 | End: 2021-07-11

## 2020-12-28 RX ORDER — HYDROMORPHONE HYDROCHLORIDE 1 MG/ML
0.2 INJECTION, SOLUTION INTRAMUSCULAR; INTRAVENOUS; SUBCUTANEOUS EVERY 5 MIN PRN
Status: DISCONTINUED | OUTPATIENT
Start: 2020-12-28 | End: 2020-12-28

## 2020-12-28 RX ORDER — HYDROMORPHONE HYDROCHLORIDE 1 MG/ML
0.4 INJECTION, SOLUTION INTRAMUSCULAR; INTRAVENOUS; SUBCUTANEOUS EVERY 5 MIN PRN
Status: DISCONTINUED | OUTPATIENT
Start: 2020-12-28 | End: 2020-12-28

## 2020-12-28 RX ORDER — HYDROMORPHONE HYDROCHLORIDE 1 MG/ML
0.6 INJECTION, SOLUTION INTRAMUSCULAR; INTRAVENOUS; SUBCUTANEOUS EVERY 5 MIN PRN
Status: DISCONTINUED | OUTPATIENT
Start: 2020-12-28 | End: 2020-12-28

## 2020-12-28 RX ORDER — PHENAZOPYRIDINE HYDROCHLORIDE 200 MG/1
200 TABLET, FILM COATED ORAL ONCE AS NEEDED
Status: CANCELLED | OUTPATIENT
Start: 2020-12-28 | End: 2020-12-28

## 2020-12-28 RX ORDER — ONDANSETRON 2 MG/ML
4 INJECTION INTRAMUSCULAR; INTRAVENOUS ONCE AS NEEDED
Status: DISCONTINUED | OUTPATIENT
Start: 2020-12-28 | End: 2020-12-28

## 2020-12-28 RX ORDER — MORPHINE SULFATE 10 MG/ML
6 INJECTION, SOLUTION INTRAMUSCULAR; INTRAVENOUS EVERY 10 MIN PRN
Status: DISCONTINUED | OUTPATIENT
Start: 2020-12-28 | End: 2020-12-28

## 2020-12-28 RX ORDER — FAMOTIDINE 20 MG/1
20 TABLET ORAL ONCE
Status: DISCONTINUED | OUTPATIENT
Start: 2020-12-28 | End: 2020-12-28 | Stop reason: HOSPADM

## 2020-12-28 RX ORDER — ACETAMINOPHEN 500 MG
1000 TABLET ORAL ONCE
Status: COMPLETED | OUTPATIENT
Start: 2020-12-28 | End: 2020-12-28

## 2020-12-28 RX ORDER — NALOXONE HYDROCHLORIDE 0.4 MG/ML
80 INJECTION, SOLUTION INTRAMUSCULAR; INTRAVENOUS; SUBCUTANEOUS AS NEEDED
Status: DISCONTINUED | OUTPATIENT
Start: 2020-12-28 | End: 2020-12-28

## 2020-12-28 RX ORDER — ACETAMINOPHEN 325 MG/1
650 TABLET ORAL EVERY 4 HOURS PRN
Status: CANCELLED | OUTPATIENT
Start: 2020-12-28

## 2020-12-28 RX ORDER — METOCLOPRAMIDE 10 MG/1
10 TABLET ORAL ONCE
Status: COMPLETED | OUTPATIENT
Start: 2020-12-28 | End: 2020-12-28

## 2020-12-28 RX ORDER — ONDANSETRON 2 MG/ML
INJECTION INTRAMUSCULAR; INTRAVENOUS AS NEEDED
Status: DISCONTINUED | OUTPATIENT
Start: 2020-12-28 | End: 2020-12-28 | Stop reason: SURG

## 2020-12-28 RX ORDER — TRIAMCINOLONE ACETONIDE 40 MG/ML
INJECTION, SUSPENSION INTRA-ARTICULAR; INTRAMUSCULAR AS NEEDED
Status: DISCONTINUED | OUTPATIENT
Start: 2020-12-28 | End: 2020-12-28 | Stop reason: HOSPADM

## 2020-12-28 RX ORDER — CEFADROXIL 500 MG/1
500 CAPSULE ORAL 2 TIMES DAILY
Qty: 6 CAPSULE | Refills: 1 | Status: SHIPPED | OUTPATIENT
Start: 2020-12-28 | End: 2020-12-31

## 2020-12-28 RX ORDER — PROCHLORPERAZINE EDISYLATE 5 MG/ML
5 INJECTION INTRAMUSCULAR; INTRAVENOUS ONCE AS NEEDED
Status: DISCONTINUED | OUTPATIENT
Start: 2020-12-28 | End: 2020-12-28

## 2020-12-28 RX ORDER — MORPHINE SULFATE 4 MG/ML
4 INJECTION, SOLUTION INTRAMUSCULAR; INTRAVENOUS EVERY 10 MIN PRN
Status: DISCONTINUED | OUTPATIENT
Start: 2020-12-28 | End: 2020-12-28

## 2020-12-28 RX ORDER — METOPROLOL TARTRATE 5 MG/5ML
2.5 INJECTION INTRAVENOUS ONCE
Status: DISCONTINUED | OUTPATIENT
Start: 2020-12-28 | End: 2020-12-28

## 2020-12-28 RX ORDER — SODIUM CHLORIDE, SODIUM LACTATE, POTASSIUM CHLORIDE, CALCIUM CHLORIDE 600; 310; 30; 20 MG/100ML; MG/100ML; MG/100ML; MG/100ML
INJECTION, SOLUTION INTRAVENOUS CONTINUOUS
Status: DISCONTINUED | OUTPATIENT
Start: 2020-12-28 | End: 2020-12-28

## 2020-12-28 RX ORDER — CEFADROXIL 500 MG/1
500 CAPSULE ORAL ONCE
COMMUNITY
Start: 2020-12-28 | End: 2021-03-11 | Stop reason: ALTCHOICE

## 2020-12-28 RX ORDER — MORPHINE SULFATE 4 MG/ML
2 INJECTION, SOLUTION INTRAMUSCULAR; INTRAVENOUS EVERY 10 MIN PRN
Status: DISCONTINUED | OUTPATIENT
Start: 2020-12-28 | End: 2020-12-28

## 2020-12-28 RX ORDER — LIDOCAINE HYDROCHLORIDE 10 MG/ML
INJECTION, SOLUTION EPIDURAL; INFILTRATION; INTRACAUDAL; PERINEURAL AS NEEDED
Status: DISCONTINUED | OUTPATIENT
Start: 2020-12-28 | End: 2020-12-28 | Stop reason: SURG

## 2020-12-28 RX ORDER — HYDROCODONE BITARTRATE AND ACETAMINOPHEN 5; 325 MG/1; MG/1
1 TABLET ORAL AS NEEDED
Status: DISCONTINUED | OUTPATIENT
Start: 2020-12-28 | End: 2020-12-28

## 2020-12-28 RX ORDER — DEXAMETHASONE SODIUM PHOSPHATE 4 MG/ML
VIAL (ML) INJECTION AS NEEDED
Status: DISCONTINUED | OUTPATIENT
Start: 2020-12-28 | End: 2020-12-28 | Stop reason: SURG

## 2020-12-28 RX ADMIN — CEFAZOLIN SODIUM/WATER 2 G: 2 G/20 ML SYRINGE (ML) INTRAVENOUS at 12:36:00

## 2020-12-28 RX ADMIN — DEXAMETHASONE SODIUM PHOSPHATE 4 MG: 4 MG/ML VIAL (ML) INJECTION at 12:35:00

## 2020-12-28 RX ADMIN — SODIUM CHLORIDE, SODIUM LACTATE, POTASSIUM CHLORIDE, CALCIUM CHLORIDE: 600; 310; 30; 20 INJECTION, SOLUTION INTRAVENOUS at 12:25:00

## 2020-12-28 RX ADMIN — LIDOCAINE HYDROCHLORIDE 25 MG: 10 INJECTION, SOLUTION EPIDURAL; INFILTRATION; INTRACAUDAL; PERINEURAL at 12:29:00

## 2020-12-28 RX ADMIN — ONDANSETRON 4 MG: 2 INJECTION INTRAMUSCULAR; INTRAVENOUS at 12:35:00

## 2020-12-28 RX ADMIN — SODIUM CHLORIDE, SODIUM LACTATE, POTASSIUM CHLORIDE, CALCIUM CHLORIDE: 600; 310; 30; 20 INJECTION, SOLUTION INTRAVENOUS at 13:47:00

## 2020-12-28 NOTE — ANESTHESIA POSTPROCEDURE EVALUATION
Patient: Seth Butler    Procedure Summary     Date: 12/28/20 Room / Location: [facility] OR  / [facility] OR    Anesthesia Start: 2042 Anesthesia Stop: 1014    Procedures:       CYSTOSCOPY STENT INSERTION (Right Bladder)      CYSTOSCOPY URETEROSCOPY (

## 2020-12-28 NOTE — H&P
Asad Arnold MD   Physician   Urology   H&P      Signed   Encounter Date:  11/20/2020               Signed             Show:Clear all  [x]Manual[x]Template[x]Copied    Added by:  Chirag Bingham MD    []Junito for details  Eleanor Dhaliwal Hypothyroid     Migraine     Obesity     PCOS (polycystic ovarian syndrome)     Fatty liver disease, nonalcoholic     Chronic back pain     Constipation, unspecified constipation type     Status post nephrectomy     Belching symptom     Renal cell carci • Cancer Father           Skin cancer.     • Arthritis Mother     • Cancer Mother           lung, skin and MDS   • Hypertension Mother     • Heart Disorder Mother 61         CAD S/P MI.    • Cancer Brother           Hodgkin's   • Hypertension Brother     • NECK: supple, no lymphadenopathy, thyroid wnl, neck normal     RESPIRATORY: no rales, rhonchi, or wheezes B, lungs clear  CARDIO: RRR without murmur, no murmur, no gallop, normal S1 and S2  ABDOMEN: soft, non-tender with no palpable aneurysm or masses, rex My assessment is she has a right hydronephrosis status post a right partial nephrectomy.  She may have scarring of her proximal right ureter.     Principle Problem:   <principal problem not specified>     Active Problem:   Patient Active Problem List:     T I saw Jon Holt in the office on 12/21/20. She is a 58-year-old female who had a lower pole right renal cell carcinoma treated with a partial nephrectomy in October 2018. She is done well since then with no evidence of recurrence.  She had a CT scan which show Colon cancer screening     Breast cancer screening     Jeffery neuroma, right           HISTORY:       Past Medical History:   Diagnosis Date   • Calculus of kidney     • Diabetes ([facility] [address]     • Epicondylitis, lateral 06/22/2018     [facility] Social History: Social History    Tobacco Use      Smoking status: Never Smoker      Smokeless tobacco: Never Used    Alcohol use: Yes      Alcohol/week: 0.0 standard drinks      Comment: occassionally     Drug use:  No         Medications :  Insulin  Levot EXTREMITIES: no edema, full range of motion, no tenderness, pulse normal  NEURO/PSYCH: orientated x3, normal mood and affect, no sensory or motor deficit, muscle strength normal  :  Adnexa area palpably normal     Laboratory Data:  No results for input(s Migraine     Obesity     PCOS (polycystic ovarian syndrome)     Fatty liver disease, nonalcoholic     Chronic back pain     Constipation, unspecified constipation type     Status post nephrectomy     Belching symptom     Renal cell carcinoma of right ki we did a telehealth visit with Ira Byrnes on 11/3/20. She is a 70-year-old female that had a right partial nephrectomy of the lower pole the kidney for renal cell carcinoma in in October 2018.  She had a fluid collection postop which was managed with a right st <principal problem not specified>     Active Problem:   Patient Active Problem List:     Type 2 diabetes mellitus without complication, without long-term current use of insulin (HCC)     Elevated liver enzymes     HTN (hypertension)     Hypothyroid     Chad   Status post Left lateral epicondylar release. Done by [facility].    • REMOVAL GALLBLADDER                   Family History   Problem Relation Age of Onset   • Diabetes Father     • Arthritis Father     • Hypertension Father     • Heart Disease Her physical exam will be done at time of admission for her procedure.   Laboratory Data:  No results for input(s): URINE, CULTI, BLDSMR in the last 168 hours.     Chemistries:        Lab Results   Component Value Date     CREATSERUM 0.99 10/05/2020       C PROCEDURE:   NM RENAL WITH LASIX (CPT=78708)  COMPARISON: None.   INDICATIONS:    Right hydronephrosis, history of partial right nephrectomy  TECHNIQUE:           After obtaining the patient's consent, a bolus of 10.1 mCi of Tc-99m MAG-3 was injected into t CONCLUSION: Abnormal Lasix renal study demonstrating high-grade mechanical obstruction on the right. There is good perfusion to the right kidney.   The relatively decreased function compared to the left kidney is likely proportionate to the partial nephrec My recommendations were to either continue to observe her if she was comfortable and get a follow-up Lasix renal scan in four months and a follow-up CT scan in four months.  Or we can do a cystoscopy and a right retrograde pyelogram, right ureteroscopy, pos

## 2020-12-28 NOTE — BRIEF OP NOTE
Pre-Operative Diagnosis: Right proximal ureteral stricture, right hydronephrosis, right renal colic, status post right partial nephrectomy for renal cell carcinoma.      Post-Operative Diagnosis: Same     Procedure Performed:   Procedure(s):  Cystoscopy, ri

## 2020-12-28 NOTE — ANESTHESIA PREPROCEDURE EVALUATION
Anesthesia PreOp Note    HPI:     Nusrat Black is a 44year old female who presents for preoperative consultation requested by: Oscar Alvarenga MD    Date of Surgery: 12/28/2020    Procedure(s):  CYSTOSCOPY STENT INSERTION  CYSTOSCOPY URETEROSCOPY [facility])    • Disorder of thyroid    • Epicondylitis, lateral 06/22/2018    [facility].     • PONV (postoperative nausea and vomiting)     difficulty arousing        Past Surgical History:   Procedure Laterality Date   • APPENDECTOMY  2008   • A (JARDIANCE) 25 MG Oral Tab, Take 1 tablet by mouth daily. , Disp: 90 tablet, Rfl: 1, 12/27/2020 at 900    •  AMLODIPINE BESYLATE 2.5 MG Oral Tab, TAKE 1 TABLET TWICE A DAY, Disp: 180 tablet, Rfl: 1, 12/28/2020 at 930    •  Candesartan Cilexetil 8 MG Oral Ta [facility]-ordered outpatient medications on file.         Lisinopril              Coughing  Metformin               DIARRHEA    Family History   Problem Relation Age of Onset   • Diabetes Father    • Arthritis Father    • Hypertension Father    • Heart Disease F partner: Not on file        Emotionally abused: Not on file        Physically abused: Not on file        Forced sexual activity: Not on file    Other Topics      Concerns:        Not on file    Social History Narrative      Not on file      Available pre-o procedure  PONV prophylaxis with Scopolamine, Decadron and Ondansetron  Informed Consent Plan and Risks Discussed With:  Patient  Discussed plan with:  Surgeon and CRNA      I have informed Lord Hardwick and/or legal guardian or family member of the n

## 2020-12-28 NOTE — ANESTHESIA PROCEDURE NOTES
Airway  Date/Time: 12/28/2020 12:31 PM  Urgency: Elective    Airway not difficult    General Information and Staff    Patient location during procedure: OR  Anesthesiologist: Asad Ureña MD  Resident/CRNA: Jean Brower CRNA  Performed: CORAL

## 2020-12-28 NOTE — OPERATIVE REPORT
[facility]    PATIENT'S NAME: Navneet Otto   ATTENDING PHYSICIAN: Neeta Galindo MD   OPERATING PHYSICIAN: Neeta Galindo MD   PATIENT ACCOUNT#:   [de-identified]    LOCATION:  [place] [address]  MEDICAL RECORD #:   [id number] of anywhere from 30% to 50%. Understanding all, she accepts and desires us to proceed. FINDINGS:  The urethra was normal.  Bladder was normal.  Postvoid residual was low.   Right retrograde pyelogram showed a proximal right ureteral stricture, which was this point, we let the balloon down. We dilated at 10 mm of water pressure for 1 minute. We then did a rigid ureteroscope. I could get to about the stricture but not quite right to it, but I could see that it was narrowed and closed out.   The wires both junction of the UPJ where the stricture was. I put in a mixture of Kenalog cream; it is triamcinolone acetonide injectable suspension. It was 40 mg in 1 mL.   We put about 0.5 mL of saline in so that it would make it up through the catheter, and then we f

## 2020-12-28 NOTE — INTERVAL H&P NOTE
Pre-op Diagnosis: renal cell carcinoma, ureteral stricture, right hydronephrosis    The above referenced H&P was reviewed by Abby Bermudez MD on 12/28/2020, the patient was examined and no significant changes have occurred in the patient's condition si

## 2021-01-05 ENCOUNTER — HOSPITAL ENCOUNTER (OUTPATIENT)
Dept: ULTRASOUND IMAGING | Age: 51
Discharge: HOME | End: 2021-01-05
Attending: UROLOGY
Payer: COMMERCIAL

## 2021-01-05 ENCOUNTER — HOSPITAL ENCOUNTER (OUTPATIENT)
Dept: GENERAL RADIOLOGY | Age: 51
Discharge: HOME | End: 2021-01-05
Attending: UROLOGY
Payer: COMMERCIAL

## 2021-01-05 DIAGNOSIS — R10.9 FLANK PAIN: ICD-10-CM

## 2021-01-05 PROCEDURE — 74018 RADEX ABDOMEN 1 VIEW: CPT | Performed by: UROLOGY

## 2021-01-05 PROCEDURE — 76775 US EXAM ABDO BACK WALL LIM: CPT | Performed by: UROLOGY

## 2021-01-13 ENCOUNTER — HOSPITAL ENCOUNTER (OUTPATIENT)
Dept: NUCLEAR MEDICINE | Facility: HOSPITAL | Age: 51
Discharge: HOME | End: 2021-01-13
Attending: UROLOGY
Payer: COMMERCIAL

## 2021-01-13 DIAGNOSIS — N13.30 HYDRONEPHROSIS, UNSPECIFIED HYDRONEPHROSIS TYPE: ICD-10-CM

## 2021-01-13 PROCEDURE — 78708 K FLOW/FUNCT IMAGE W/DRUG: CPT | Performed by: UROLOGY

## 2021-01-13 RX ORDER — MIRABEGRON 50 MG/1
TABLET, FILM COATED, EXTENDED RELEASE ORAL DAILY
COMMUNITY
End: 2021-03-23 | Stop reason: ALTCHOICE

## 2021-01-13 RX ORDER — FUROSEMIDE 10 MG/ML
INJECTION INTRAMUSCULAR; INTRAVENOUS
Status: COMPLETED
Start: 2021-01-13 | End: 2021-01-13

## 2021-01-13 RX ORDER — FUROSEMIDE 10 MG/ML
40 INJECTION INTRAMUSCULAR; INTRAVENOUS ONCE
Status: COMPLETED | OUTPATIENT
Start: 2021-01-13 | End: 2021-01-13

## 2021-01-13 RX ADMIN — FUROSEMIDE 40 MG: 10 INJECTION INTRAMUSCULAR; INTRAVENOUS at 09:30:00

## 2021-01-14 ENCOUNTER — HOSPITAL ENCOUNTER (OUTPATIENT)
Facility: HOSPITAL | Age: 51
Setting detail: HOSPITAL OUTPATIENT SURGERY
Discharge: HOME | End: 2021-01-14
Attending: UROLOGY | Admitting: UROLOGY
Payer: COMMERCIAL

## 2021-01-14 ENCOUNTER — APPOINTMENT (OUTPATIENT)
Dept: GENERAL RADIOLOGY | Facility: HOSPITAL | Age: 51
End: 2021-01-14
Attending: UROLOGY
Payer: COMMERCIAL

## 2021-01-14 ENCOUNTER — ANESTHESIA EVENT (OUTPATIENT)
Dept: SURGERY | Facility: HOSPITAL | Age: 51
End: 2021-01-14
Payer: COMMERCIAL

## 2021-01-14 ENCOUNTER — ANESTHESIA (OUTPATIENT)
Dept: SURGERY | Facility: HOSPITAL | Age: 51
End: 2021-01-14
Payer: COMMERCIAL

## 2021-01-14 VITALS
DIASTOLIC BLOOD PRESSURE: 66 MMHG | HEIGHT: 66 IN | BODY MASS INDEX: 38.57 KG/M2 | WEIGHT: 240 LBS | RESPIRATION RATE: 15 BRPM | TEMPERATURE: 98 F | HEART RATE: 68 BPM | OXYGEN SATURATION: 93 % | SYSTOLIC BLOOD PRESSURE: 124 MMHG

## 2021-01-14 LAB
B-HCG UR QL: NEGATIVE
GLUCOSE BLDC GLUCOMTR-MCNC: 148 MG/DL (ref 70–99)
GLUCOSE BLDC GLUCOMTR-MCNC: 162 MG/DL (ref 70–99)

## 2021-01-14 PROCEDURE — 87186 SC STD MICRODIL/AGAR DIL: CPT | Performed by: ANESTHESIOLOGY

## 2021-01-14 PROCEDURE — 0T768DZ DILATION OF RIGHT URETER WITH INTRALUMINAL DEVICE, VIA NATURAL OR ARTIFICIAL OPENING ENDOSCOPIC: ICD-10-PCS | Performed by: UROLOGY

## 2021-01-14 PROCEDURE — 87106 FUNGI IDENTIFICATION YEAST: CPT | Performed by: UROLOGY

## 2021-01-14 PROCEDURE — 81025 URINE PREGNANCY TEST: CPT

## 2021-01-14 PROCEDURE — 82962 GLUCOSE BLOOD TEST: CPT

## 2021-01-14 PROCEDURE — 87086 URINE CULTURE/COLONY COUNT: CPT | Performed by: UROLOGY

## 2021-01-14 PROCEDURE — 88108 CYTOPATH CONCENTRATE TECH: CPT | Performed by: UROLOGY

## 2021-01-14 DEVICE — URETERAL STENT
Type: IMPLANTABLE DEVICE | Site: URETER | Status: FUNCTIONAL
Brand: ASCERTA™

## 2021-01-14 RX ORDER — METOCLOPRAMIDE 10 MG/1
10 TABLET ORAL ONCE
Status: COMPLETED | OUTPATIENT
Start: 2021-01-14 | End: 2021-01-14

## 2021-01-14 RX ORDER — ONDANSETRON 2 MG/ML
4 INJECTION INTRAMUSCULAR; INTRAVENOUS ONCE AS NEEDED
Status: DISCONTINUED | OUTPATIENT
Start: 2021-01-14 | End: 2021-01-14

## 2021-01-14 RX ORDER — MORPHINE SULFATE 4 MG/ML
4 INJECTION, SOLUTION INTRAMUSCULAR; INTRAVENOUS EVERY 10 MIN PRN
Status: DISCONTINUED | OUTPATIENT
Start: 2021-01-14 | End: 2021-01-14

## 2021-01-14 RX ORDER — HYDROMORPHONE HYDROCHLORIDE 1 MG/ML
0.2 INJECTION, SOLUTION INTRAMUSCULAR; INTRAVENOUS; SUBCUTANEOUS EVERY 5 MIN PRN
Status: DISCONTINUED | OUTPATIENT
Start: 2021-01-14 | End: 2021-01-14

## 2021-01-14 RX ORDER — CEFAZOLIN SODIUM 1 G/3ML
INJECTION, POWDER, FOR SOLUTION INTRAMUSCULAR; INTRAVENOUS AS NEEDED
Status: DISCONTINUED | OUTPATIENT
Start: 2021-01-14 | End: 2021-01-14 | Stop reason: SURG

## 2021-01-14 RX ORDER — SODIUM CHLORIDE, SODIUM LACTATE, POTASSIUM CHLORIDE, CALCIUM CHLORIDE 600; 310; 30; 20 MG/100ML; MG/100ML; MG/100ML; MG/100ML
INJECTION, SOLUTION INTRAVENOUS CONTINUOUS
Status: DISCONTINUED | OUTPATIENT
Start: 2021-01-14 | End: 2021-01-14

## 2021-01-14 RX ORDER — HALOPERIDOL 5 MG/ML
0.25 INJECTION INTRAMUSCULAR ONCE AS NEEDED
Status: DISCONTINUED | OUTPATIENT
Start: 2021-01-14 | End: 2021-01-14

## 2021-01-14 RX ORDER — HYDROCODONE BITARTRATE AND ACETAMINOPHEN 5; 325 MG/1; MG/1
2 TABLET ORAL EVERY 4 HOURS PRN
Status: DISCONTINUED | OUTPATIENT
Start: 2021-01-14 | End: 2021-01-14

## 2021-01-14 RX ORDER — HYDROCODONE BITARTRATE AND ACETAMINOPHEN 5; 325 MG/1; MG/1
1 TABLET ORAL AS NEEDED
Status: DISCONTINUED | OUTPATIENT
Start: 2021-01-14 | End: 2021-01-14

## 2021-01-14 RX ORDER — HYDROCODONE BITARTRATE AND ACETAMINOPHEN 5; 325 MG/1; MG/1
1 TABLET ORAL EVERY 4 HOURS PRN
Status: DISCONTINUED | OUTPATIENT
Start: 2021-01-14 | End: 2021-01-14

## 2021-01-14 RX ORDER — SCOLOPAMINE TRANSDERMAL SYSTEM 1 MG/1
1 PATCH, EXTENDED RELEASE TRANSDERMAL
Status: DISCONTINUED | OUTPATIENT
Start: 2021-01-14 | End: 2021-01-17

## 2021-01-14 RX ORDER — DEXTROSE MONOHYDRATE 25 G/50ML
50 INJECTION, SOLUTION INTRAVENOUS
Status: DISCONTINUED | OUTPATIENT
Start: 2021-01-14 | End: 2021-01-14

## 2021-01-14 RX ORDER — FAMOTIDINE 20 MG/1
20 TABLET ORAL ONCE
Status: DISCONTINUED | OUTPATIENT
Start: 2021-01-14 | End: 2021-01-14 | Stop reason: HOSPADM

## 2021-01-14 RX ORDER — FLUCONAZOLE 50 MG/1
100 TABLET ORAL DAILY
Qty: 7 TABLET | Refills: 1 | Status: SHIPPED | OUTPATIENT
Start: 2021-01-14 | End: 2021-03-11 | Stop reason: ALTCHOICE

## 2021-01-14 RX ORDER — LIDOCAINE HYDROCHLORIDE 10 MG/ML
INJECTION, SOLUTION EPIDURAL; INFILTRATION; INTRACAUDAL; PERINEURAL AS NEEDED
Status: DISCONTINUED | OUTPATIENT
Start: 2021-01-14 | End: 2021-01-14 | Stop reason: SURG

## 2021-01-14 RX ORDER — CEFADROXIL 500 MG/1
500 CAPSULE ORAL 2 TIMES DAILY
Qty: 14 CAPSULE | Refills: 1 | Status: SHIPPED | OUTPATIENT
Start: 2021-01-14 | End: 2021-01-21

## 2021-01-14 RX ORDER — HYDROMORPHONE HYDROCHLORIDE 1 MG/ML
0.4 INJECTION, SOLUTION INTRAMUSCULAR; INTRAVENOUS; SUBCUTANEOUS EVERY 5 MIN PRN
Status: DISCONTINUED | OUTPATIENT
Start: 2021-01-14 | End: 2021-01-14

## 2021-01-14 RX ORDER — MORPHINE SULFATE 4 MG/ML
2 INJECTION, SOLUTION INTRAMUSCULAR; INTRAVENOUS EVERY 10 MIN PRN
Status: DISCONTINUED | OUTPATIENT
Start: 2021-01-14 | End: 2021-01-14

## 2021-01-14 RX ORDER — HYDROCODONE BITARTRATE AND ACETAMINOPHEN 5; 325 MG/1; MG/1
2 TABLET ORAL AS NEEDED
Status: DISCONTINUED | OUTPATIENT
Start: 2021-01-14 | End: 2021-01-14

## 2021-01-14 RX ORDER — ACETAMINOPHEN 500 MG
1000 TABLET ORAL ONCE
Status: COMPLETED | OUTPATIENT
Start: 2021-01-14 | End: 2021-01-14

## 2021-01-14 RX ORDER — PHENAZOPYRIDINE HYDROCHLORIDE 95 MG/1
95 TABLET ORAL 3 TIMES DAILY PRN
Qty: 21 TABLET | Refills: 6 | Status: SHIPPED | OUTPATIENT
Start: 2021-01-14 | End: 2021-03-11

## 2021-01-14 RX ORDER — PROCHLORPERAZINE EDISYLATE 5 MG/ML
5 INJECTION INTRAMUSCULAR; INTRAVENOUS ONCE AS NEEDED
Status: DISCONTINUED | OUTPATIENT
Start: 2021-01-14 | End: 2021-01-14

## 2021-01-14 RX ORDER — MORPHINE SULFATE 10 MG/ML
6 INJECTION, SOLUTION INTRAMUSCULAR; INTRAVENOUS EVERY 10 MIN PRN
Status: DISCONTINUED | OUTPATIENT
Start: 2021-01-14 | End: 2021-01-14

## 2021-01-14 RX ORDER — ACETAMINOPHEN 325 MG/1
650 TABLET ORAL EVERY 4 HOURS PRN
Status: DISCONTINUED | OUTPATIENT
Start: 2021-01-14 | End: 2021-01-14

## 2021-01-14 RX ORDER — PHENAZOPYRIDINE HYDROCHLORIDE 200 MG/1
200 TABLET, FILM COATED ORAL ONCE AS NEEDED
Status: COMPLETED | OUTPATIENT
Start: 2021-01-14 | End: 2021-01-14

## 2021-01-14 RX ORDER — GENTAMICIN SULFATE 40 MG/ML
INJECTION, SOLUTION INTRAMUSCULAR; INTRAVENOUS AS NEEDED
Status: DISCONTINUED | OUTPATIENT
Start: 2021-01-14 | End: 2021-01-14 | Stop reason: HOSPADM

## 2021-01-14 RX ORDER — DEXAMETHASONE SODIUM PHOSPHATE 4 MG/ML
VIAL (ML) INJECTION AS NEEDED
Status: DISCONTINUED | OUTPATIENT
Start: 2021-01-14 | End: 2021-01-14 | Stop reason: SURG

## 2021-01-14 RX ORDER — NALOXONE HYDROCHLORIDE 0.4 MG/ML
80 INJECTION, SOLUTION INTRAMUSCULAR; INTRAVENOUS; SUBCUTANEOUS AS NEEDED
Status: DISCONTINUED | OUTPATIENT
Start: 2021-01-14 | End: 2021-01-14

## 2021-01-14 RX ORDER — HYDROMORPHONE HYDROCHLORIDE 1 MG/ML
0.6 INJECTION, SOLUTION INTRAMUSCULAR; INTRAVENOUS; SUBCUTANEOUS EVERY 5 MIN PRN
Status: DISCONTINUED | OUTPATIENT
Start: 2021-01-14 | End: 2021-01-14

## 2021-01-14 RX ORDER — ONDANSETRON 2 MG/ML
INJECTION INTRAMUSCULAR; INTRAVENOUS AS NEEDED
Status: DISCONTINUED | OUTPATIENT
Start: 2021-01-14 | End: 2021-01-14 | Stop reason: SURG

## 2021-01-14 RX ORDER — FUROSEMIDE 10 MG/ML
INJECTION INTRAMUSCULAR; INTRAVENOUS AS NEEDED
Status: DISCONTINUED | OUTPATIENT
Start: 2021-01-14 | End: 2021-01-14 | Stop reason: SURG

## 2021-01-14 RX ADMIN — FUROSEMIDE 10 MG: 10 INJECTION INTRAMUSCULAR; INTRAVENOUS at 11:35:00

## 2021-01-14 RX ADMIN — SODIUM CHLORIDE, SODIUM LACTATE, POTASSIUM CHLORIDE, CALCIUM CHLORIDE: 600; 310; 30; 20 INJECTION, SOLUTION INTRAVENOUS at 11:51:00

## 2021-01-14 RX ADMIN — LIDOCAINE HYDROCHLORIDE 50 MG: 10 INJECTION, SOLUTION EPIDURAL; INFILTRATION; INTRACAUDAL; PERINEURAL at 11:05:00

## 2021-01-14 RX ADMIN — ONDANSETRON 4 MG: 2 INJECTION INTRAMUSCULAR; INTRAVENOUS at 11:11:00

## 2021-01-14 RX ADMIN — CEFAZOLIN SODIUM 2 G: 1 INJECTION, POWDER, FOR SOLUTION INTRAMUSCULAR; INTRAVENOUS at 11:54:00

## 2021-01-14 RX ADMIN — SODIUM CHLORIDE, SODIUM LACTATE, POTASSIUM CHLORIDE, CALCIUM CHLORIDE: 600; 310; 30; 20 INJECTION, SOLUTION INTRAVENOUS at 11:00:00

## 2021-01-14 RX ADMIN — DEXAMETHASONE SODIUM PHOSPHATE 4 MG: 4 MG/ML VIAL (ML) INJECTION at 11:11:00

## 2021-01-14 NOTE — ANESTHESIA POSTPROCEDURE EVALUATION
Patient: Fermin Gillette    Procedure Summary     Date: 01/14/21 Room / Location: [facility] OR  / [facility] OR    Anesthesia Start: 1100 Anesthesia Stop: 7218    Procedure: CYSTOSCOPY RETROGRADE (Right ) Diagnosis: (ureteral stricture)    Surgeons: Buster Osler

## 2021-01-14 NOTE — ANESTHESIA PROCEDURE NOTES
Airway  Urgency: Elective      General Information and Staff    Patient location during procedure: OR  Anesthesiologist: Bryant Burk MD  Resident/CRNA: Kimberlee Buck CRNA  Performed: CRNA     Indications and Patient Condition  Indications for airway manag

## 2021-01-14 NOTE — BRIEF OP NOTE
[facility] POST ANESTHESIA CARE UNIT  Brief Op Note       Patients Name: Myesha Lyons  Attending Physician: Rei Reynolds MD  Operating Physician: Belinda Kent MD  CSN: [id number]     Location:  OR  MRN: [id number]    Date of Birth: [birth year] for cytology and urine culture and sensitivity to look for yeast.  We then do a balloon dilatation which went easily over this area as there was no intrinsic blockage it looked to be more extrinsic.   Or just chronic poor function of the segment of the uret

## 2021-01-14 NOTE — ANESTHESIA PREPROCEDURE EVALUATION
Anesthesia PreOp Note    HPI:     Myesha Lyons is a 44year old female who presents for preoperative consultation requested by: Rie Reynolds MD    Date of Surgery: 1/14/2021    Procedure(s):  CYSTOSCOPY RETROGRADE  CYSTOSCOPY STENT INSERTION  C thyroid    • Epicondylitis, lateral 06/22/2018    [facility]     • High blood pressure    • PONV (postoperative nausea and vomiting)     difficulty arousing        Past Surgical History:   Procedure Laterality Date   • APPENDECTOMY  2008   • 6, Past Week at Unknown time    •  Oxybutynin Chloride ER 5 MG Oral Tablet 24 Hr, Take 1 tablet (5 mg total) by mouth daily. , Disp: 30 tablet, Rfl: 0, 1/14/2021 at Unknown time    •  KRILL OIL OR, Take 1 capsule by mouth daily. , Disp: , Rfl: , 1/13/2021 at times daily as needed.   , Disp: , Rfl: , More than a month at Unknown time        •  lactated ringers infusion, , Intravenous, Continuous, Víctor Hernandez MD, Last Rate: 20 mL/hr at 01/14/21 0292    •  famoTIDine (PEPCID) tab 20 mg, 20 mg, Oral, Once, K Stress: Not on file    Relationships      Social connections        Talks on phone: Not on file        Gets together: Not on file        Attends [employer] service: Not on file        Active member of club or organization: Not on file        Attends [employer] [facility]/[employer]    (-) neuromuscular disease    GI/Hepatic/Renal    (+) GERD well controlled, liver disease, chronic renal disease (hx renal carcinoma 10/1/2018, partial nephrectomy),     Endo/Other    (+) diabetes mellitus type 2 well controlled using insulin

## 2021-01-14 NOTE — H&P
MD Elias Rose MD   Physician   Urology        H&P      Signed        Encounter Date:  11/20/2020                               Signed                                   Expand wi proceed.     History provided by Dr. Marti Peoples Problem:     <principal problem not specified>         Active Problem:     Patient Active Problem List:       Type 2 diabetes mellitus without complication, without long-term current use of insu Performed by Umair Blood MD at [facility] OR       •     CYSTOSCOPY [facility]     Right     12/28/2020             Performed by Umair Blood MD at [facility] OR       •     [facility]     Right     1/10/2019             P Comment: occassionally       Drug use:  No                 Medications :    Candesartan Cilexetil    Azo    Oxybutynin    Insulin    Levothyroxine    Atenolol    Spironolactone    Jardiance    Amlodipine    Ambien    Iron    Pepcid    Ophthalmic solution tenderness    FLANK: normal    SKIN: no rashes, no suspicious lesions, warm and dry    EXTREMITIES: no edema, full range of motion, no tenderness, pulse normal    NEURO/PSYCH: orientated x3, normal mood and affect, no sensory or motor deficit, muscle stren View) (cpt=74018)         Result Date: 1/5/2021    PROCEDURE:   XR ABDOMEN (1 VIEW) (CPT=74018)  COMPARISON: [facility], [facility]. [facility], CT ABDOMEN (CONTRAST ONLY) (CPT=74160), 10/05/2020, 7:46 AM.  Mahendra (WGW=[id number]), 11/27/2019, 4:31 PM.  INDICATIONS:          Flank pain  TECHNIQUE:   Ultrasound examination was performed to visualize the kidneys and bladder.    FINDINGS:         RIGHT KIDNEY:            The proximal portion of the patient's right nephrouret stented.     Principle Problem:     <principal problem not specified>         Active Problem:     Patient Active Problem List:       Type 2 diabetes mellitus without complication, without long-term current use of insulin (HCC)       Elevated liver enzymes

## 2021-01-15 LAB — NON GYNE INTERPRETATION: NEGATIVE

## 2021-01-15 NOTE — OPERATIVE REPORT
[facility]    PATIENT'S NAME: Luna Ascencio   ATTENDING PHYSICIAN: Mervin Smith MD   OPERATING PHYSICIAN: Mervin Smith MD   PATIENT ACCOUNT#:   [de-identified]    LOCATION:  [address]  MEDICAL RECORD #:   [id number] slightly narrowed. We balloon dilated it and we put up a bigger stent. However, she had a problem with stent irritation and, despite being on Myrbetriq and Ditropan, wanted the stent out sooner rather than later.   We did a Lasix renal scan with the stent outside of the ureter, or it could be just poor function from no peristalsis. The stent was in good position.   After giving the patient IV fluids and Lasix, after the stent was removed and time passed, there was still some contrast in the mid-proximal ure normal.  She had very concentrated urine coming from that. On the right, the stent had some white debris on it and even some early crystallizations in the coil. It did not seem to be draining well.   We used grasping forceps and removed the stent in its e compression. We deflated the balloon and checked other areas and it dilated up easily and then we brought this down.   At this point, the ureter seemed to be draining better but, because of the possibility of infection, I wanted to leave it well drained an conservatively, realizing that there is probably some chronic loss of renal function to the right kidney. Other options would be to do a completion right radical nephrectomy.   Another possibility would be to do a ureterolysis and possibly try to reimplant

## 2021-01-18 PROBLEM — N18.2 CHRONIC KIDNEY DISEASE (CKD) STAGE G2/A2, MILDLY DECREASED GLOMERULAR FILTRATION RATE (GFR) BETWEEN 60-89 ML/MIN/1.73 SQUARE METER AND ALBUMINURIA CREATININE RATIO BETWEEN 30-299 MG/G: Status: ACTIVE | Noted: 2021-01-18

## 2021-01-20 ENCOUNTER — LAB ENCOUNTER (OUTPATIENT)
Dept: LAB | Facility: REFERENCE LAB | Age: 51
End: 2021-01-20
Attending: UROLOGY
Payer: COMMERCIAL

## 2021-01-20 DIAGNOSIS — N13.30 HYDRONEPHROSIS: Primary | ICD-10-CM

## 2021-01-20 LAB — CREAT BLD-MCNC: 1.14 MG/DL

## 2021-01-20 PROCEDURE — 82565 ASSAY OF CREATININE: CPT

## 2021-01-20 PROCEDURE — 36415 COLL VENOUS BLD VENIPUNCTURE: CPT

## 2021-01-23 RX ORDER — AMLODIPINE BESYLATE 2.5 MG/1
2.5 TABLET ORAL 2 TIMES DAILY
Qty: 180 TABLET | Refills: 1 | Status: SHIPPED | OUTPATIENT
Start: 2021-01-23 | End: 2021-07-20

## 2021-01-25 ENCOUNTER — TELEPHONE (OUTPATIENT)
Dept: NEUROLOGY | Facility: CLINIC | Age: 51
End: 2021-01-25

## 2021-01-25 ENCOUNTER — OFFICE VISIT (OUTPATIENT)
Dept: NEUROLOGY | Facility: CLINIC | Age: 51
End: 2021-01-25
Payer: COMMERCIAL

## 2021-01-25 ENCOUNTER — MED REC SCAN ONLY (OUTPATIENT)
Dept: NEUROLOGY | Facility: CLINIC | Age: 51
End: 2021-01-25

## 2021-01-25 VITALS — WEIGHT: 240 LBS | HEIGHT: 66 IN | BODY MASS INDEX: 38.57 KG/M2

## 2021-01-25 DIAGNOSIS — Z90.5 STATUS POST NEPHRECTOMY: ICD-10-CM

## 2021-01-25 DIAGNOSIS — N18.2 CHRONIC KIDNEY DISEASE (CKD) STAGE G2/A2, MILDLY DECREASED GLOMERULAR FILTRATION RATE (GFR) BETWEEN 60-89 ML/MIN/1.73 SQUARE METER AND ALBUMINURIA CREATININE RATIO BETWEEN 30-299 MG/G: ICD-10-CM

## 2021-01-25 DIAGNOSIS — R10.9 FLANK PAIN: Primary | ICD-10-CM

## 2021-01-25 DIAGNOSIS — E11.9 TYPE 2 DIABETES MELLITUS WITHOUT COMPLICATION, WITHOUT LONG-TERM CURRENT USE OF INSULIN (HCC): ICD-10-CM

## 2021-01-25 DIAGNOSIS — M54.6 THORACIC SPINE PAIN: ICD-10-CM

## 2021-01-25 DIAGNOSIS — Z85.528 HISTORY OF RENAL CELL CARCINOMA: ICD-10-CM

## 2021-01-25 PROCEDURE — 99244 OFF/OP CNSLTJ NEW/EST MOD 40: CPT | Performed by: PHYSICAL MEDICINE & REHABILITATION

## 2021-01-25 PROCEDURE — 3008F BODY MASS INDEX DOCD: CPT | Performed by: PHYSICAL MEDICINE & REHABILITATION

## 2021-01-25 NOTE — PROGRESS NOTES
[address]  Progress Note    CHIEF COMPLAINT:  Patient presents with:  Low Back Pain: New patient referred by Dr. Christy Grady for right sided low back pain. Patient states that pain started abou 9 months ago.  C/ CORRECT BUNION,OTHR METHODS Right    • CYSTOSCOPY RETROGRADE Right 1/14/2021    Performed by Rainer Joseph MD at [facility] OR   • CYSTOSCOPY RETROGRADE Right 2/4/2019    Performed by Rainer Joseph MD at [address] [place] [address] Oral Tab TAKE 1 TABLET BEFORE BREAKFAST 90 tablet 1   • atenolol 25 MG Oral Tab Take 1 tablet (25 mg total) by mouth nightly.  90 tablet 1   • spironolactone 100 MG Oral Tab TAKE 1 TABLET EVERY MORNING AND ONE-HALF (1/2) TABLET IN THE EVENING 135 tablet 1 Gastrointestinal  Bowel Incontinence: denies  Heartburn: denies  Abdominal Pain: denies  Blood in Stool : denies  Rectal Pain: denies   Hematology  Easy Bruising: denies  Easy Bleeding: denies   Genitourinary  Difficulty Urinating: denies  Bladder Incont AND PLAN:  1. Flank pain  Despite the fact that this is nonpositional, given her history of renal cell carcinoma I think an MRI of the thoracic spine is reasonable looking for occult compression of the right thoracic nerve roots.   - MRI SPINE THORACIC (CPT

## 2021-01-25 NOTE — TELEPHONE ENCOUNTER
MRI SPINE THORACIC (CPT=72146)-APPROVED   Called [facility], spoke to Shaggy ARREDONDO who states no authorization is required for above.    Reference # [id number]     Patient notified via My Chart

## 2021-01-26 LAB
AMB EXT CREATININE: 1.1 MG/DL
AMB EXT GFR: 58
AMB EXT GLUCOSE: 126 MG/DL

## 2021-01-29 RX ORDER — PEN NEEDLE, DIABETIC 32GX 5/32"
NEEDLE, DISPOSABLE MISCELLANEOUS
Qty: 90 EACH | Refills: 3 | Status: SHIPPED | OUTPATIENT
Start: 2021-01-29 | End: 2022-10-31

## 2021-02-05 ENCOUNTER — LAB ENCOUNTER (OUTPATIENT)
Dept: LAB | Facility: REFERENCE LAB | Age: 51
End: 2021-02-05
Attending: INTERNAL MEDICINE
Payer: COMMERCIAL

## 2021-02-05 DIAGNOSIS — B49 FUNGUS INFECTION: ICD-10-CM

## 2021-02-05 DIAGNOSIS — N39.0 UTI (URINARY TRACT INFECTION): Primary | ICD-10-CM

## 2021-02-05 LAB
BILIRUB UR QL: NEGATIVE
COLOR UR: YELLOW
GLUCOSE UR-MCNC: >=500 MG/DL
HGB UR QL STRIP.AUTO: NEGATIVE
KETONES UR-MCNC: NEGATIVE MG/DL
NITRITE UR QL STRIP.AUTO: NEGATIVE
PH UR: 6 [PH] (ref 5–8)
PROT UR-MCNC: NEGATIVE MG/DL
RBC #/AREA URNS AUTO: 3 /HPF
SP GR UR STRIP: 1.03 (ref 1–1.03)
UROBILINOGEN UR STRIP-ACNC: <2
WBC #/AREA URNS AUTO: 57 /HPF

## 2021-02-05 PROCEDURE — 81001 URINALYSIS AUTO W/SCOPE: CPT

## 2021-02-06 ENCOUNTER — HOSPITAL ENCOUNTER (OUTPATIENT)
Dept: MRI IMAGING | Facility: HOSPITAL | Age: 51
Discharge: HOME | End: 2021-02-06
Attending: PHYSICAL MEDICINE & REHABILITATION
Payer: COMMERCIAL

## 2021-02-06 DIAGNOSIS — Z85.528 HISTORY OF RENAL CELL CARCINOMA: ICD-10-CM

## 2021-02-06 DIAGNOSIS — M54.6 THORACIC SPINE PAIN: ICD-10-CM

## 2021-02-06 DIAGNOSIS — R10.9 FLANK PAIN: ICD-10-CM

## 2021-02-06 PROCEDURE — 72146 MRI CHEST SPINE W/O DYE: CPT | Performed by: PHYSICAL MEDICINE & REHABILITATION

## 2021-02-08 ENCOUNTER — TELEPHONE (OUTPATIENT)
Dept: INTERNAL MEDICINE CLINIC | Facility: CLINIC | Age: 51
End: 2021-02-08

## 2021-02-08 ENCOUNTER — TELEPHONE (OUTPATIENT)
Dept: NEUROLOGY | Facility: CLINIC | Age: 51
End: 2021-02-08

## 2021-02-08 RX ORDER — EMPAGLIFLOZIN 25 MG/1
1 TABLET, FILM COATED ORAL DAILY
Qty: 90 TABLET | Refills: 1 | Status: SHIPPED | OUTPATIENT
Start: 2021-02-08 | End: 2021-03-23 | Stop reason: ALTCHOICE

## 2021-02-08 NOTE — TELEPHONE ENCOUNTER
Just got labs today from [facility]. Sugars been high at 166, declining kidney function is better than prior but still declined. Avoid ibuprofen, Advil, Aleve, Motrin.   Other electrolytes and liver enzymes are normal.  UA looks okay except f

## 2021-02-08 NOTE — TELEPHONE ENCOUNTER
----- Message from Kitty Hammer MD sent at 2/8/2021  3:56 PM CST -----  I personally reviewed a thoracic MRI from February 2021 showing T9-T10 foraminal and central canal stenosis with some deflection of the cord but no compression.     Please let the

## 2021-02-09 ENCOUNTER — LAB ENCOUNTER (OUTPATIENT)
Dept: LAB | Facility: REFERENCE LAB | Age: 51
End: 2021-02-09
Attending: INTERNAL MEDICINE
Payer: COMMERCIAL

## 2021-02-09 DIAGNOSIS — N39.0 ACUTE UTI: Primary | ICD-10-CM

## 2021-02-09 DIAGNOSIS — B49 FUNGAL GRANULOMA: ICD-10-CM

## 2021-02-09 PROBLEM — N30.00 ACUTE CYSTITIS WITHOUT HEMATURIA: Status: ACTIVE | Noted: 2021-02-09

## 2021-02-09 PROCEDURE — 87086 URINE CULTURE/COLONY COUNT: CPT

## 2021-02-15 RX ORDER — LEVOTHYROXINE SODIUM 0.1 MG/1
TABLET ORAL
Qty: 90 TABLET | Refills: 1 | Status: SHIPPED | OUTPATIENT
Start: 2021-02-15 | End: 2021-06-27

## 2021-02-16 ENCOUNTER — TELEPHONE (OUTPATIENT)
Dept: NEUROLOGY | Facility: CLINIC | Age: 51
End: 2021-02-16

## 2021-02-16 ENCOUNTER — OFFICE VISIT (OUTPATIENT)
Dept: NEUROLOGY | Facility: CLINIC | Age: 51
End: 2021-02-16
Payer: COMMERCIAL

## 2021-02-16 VITALS — WEIGHT: 240 LBS | OXYGEN SATURATION: 95 % | BODY MASS INDEX: 38.57 KG/M2 | HEIGHT: 66 IN | HEART RATE: 85 BPM

## 2021-02-16 DIAGNOSIS — M48.04 THORACIC STENOSIS: Primary | ICD-10-CM

## 2021-02-16 DIAGNOSIS — N18.2 CHRONIC KIDNEY DISEASE (CKD) STAGE G2/A2, MILDLY DECREASED GLOMERULAR FILTRATION RATE (GFR) BETWEEN 60-89 ML/MIN/1.73 SQUARE METER AND ALBUMINURIA CREATININE RATIO BETWEEN 30-299 MG/G: ICD-10-CM

## 2021-02-16 DIAGNOSIS — Z90.5 STATUS POST NEPHRECTOMY: ICD-10-CM

## 2021-02-16 DIAGNOSIS — E11.9 TYPE 2 DIABETES MELLITUS WITHOUT COMPLICATION, WITHOUT LONG-TERM CURRENT USE OF INSULIN (HCC): ICD-10-CM

## 2021-02-16 PROCEDURE — 3008F BODY MASS INDEX DOCD: CPT | Performed by: PHYSICAL MEDICINE & REHABILITATION

## 2021-02-16 PROCEDURE — 99214 OFFICE O/P EST MOD 30 MIN: CPT | Performed by: PHYSICAL MEDICINE & REHABILITATION

## 2021-02-16 NOTE — TELEPHONE ENCOUNTER
Paige STAHL at [facility] Case/Reference # [id number] to initiate authorization for  T10-T11 interlaminar epidural injection CPT 49671 dx:M48.04 to be done at [facility].   Coverage is active    Status: Approved- no authorization is required per health

## 2021-02-16 NOTE — PROGRESS NOTES
[address]  Progress Note    CHIEF COMPLAINT:  Patient presents with:  Back Pain: LOC: 1/25/21 Patient comes in for R flank back pain denies N/T. F/u MRI Thoracic 2/6/21. Rates pain 4-10.  Denies any taking MAIN OR   • NEPHRECTOMY Right 10/01/2018    Partial nephrectomy   • OTHER Right 2005    arthroscopy bone spur removal    • OTHER Left 01/2019    Status post Left lateral epicondylar release. Done by [facility].    • OTHER Right     toe ligament r 1   • Insulin Pen Needle (BD PEN NEEDLE LINA U/F) 32G X 4 MM Does not apply Misc USE AT BEDTIME 90 each 3   • Phenazopyridine HCl (AZO TABS) 95 MG Oral Tab Take 1 tablet (97.2 mg total) by mouth 3 (three) times daily as needed for Pain.  21 tablet 6   • [place] attentive, comprehention intact, spontaneous speech intact  Strength:  Upper extremities have 5/5 strength  Sensation: Normal upper extremities  Reflexes: Normal upper extremities  Spurling's sign: neg        Data    Radiology Imagin.  I personally rev creatinine ratio between  mg/g  NSAIDs contraindicated due to renal disease. Limits treatment options. 4. Status post nephrectomy  As above    No orders of the defined types were placed in this encounter.       RTC    Return for 2 week post inject

## 2021-02-17 NOTE — TELEPHONE ENCOUNTER
Patient has been scheduled for a T10-T11 interlaminar epidural injection on 2/25/21 at the [facility]. Medications and allergies reviewed.  Patient informed we will need verbal or written authorization from patients Primary Care Physician/Cardiologist to hold blo

## 2021-02-25 ENCOUNTER — OFFICE VISIT (OUTPATIENT)
Dept: SURGERY | Facility: CLINIC | Age: 51
End: 2021-02-25

## 2021-02-25 DIAGNOSIS — M48.04 THORACIC SPINAL STENOSIS: Primary | ICD-10-CM

## 2021-02-25 PROCEDURE — 62321 NJX INTERLAMINAR CRV/THRC: CPT | Performed by: PHYSICAL MEDICINE & REHABILITATION

## 2021-03-14 RX ORDER — ATENOLOL 25 MG/1
25 TABLET ORAL NIGHTLY
Qty: 90 TABLET | Refills: 1 | Status: SHIPPED | OUTPATIENT
Start: 2021-03-14 | End: 2021-10-10

## 2021-03-16 ENCOUNTER — OFFICE VISIT (OUTPATIENT)
Dept: NEUROLOGY | Facility: CLINIC | Age: 51
End: 2021-03-16
Payer: COMMERCIAL

## 2021-03-16 VITALS — WEIGHT: 245 LBS | BODY MASS INDEX: 39.37 KG/M2 | HEIGHT: 66 IN

## 2021-03-16 DIAGNOSIS — E11.9 TYPE 2 DIABETES MELLITUS WITHOUT COMPLICATION, WITHOUT LONG-TERM CURRENT USE OF INSULIN (HCC): ICD-10-CM

## 2021-03-16 DIAGNOSIS — Z90.5 STATUS POST NEPHRECTOMY: ICD-10-CM

## 2021-03-16 DIAGNOSIS — M48.04 THORACIC STENOSIS: Primary | ICD-10-CM

## 2021-03-16 PROCEDURE — 99213 OFFICE O/P EST LOW 20 MIN: CPT | Performed by: PHYSICAL MEDICINE & REHABILITATION

## 2021-03-16 PROCEDURE — 3008F BODY MASS INDEX DOCD: CPT | Performed by: PHYSICAL MEDICINE & REHABILITATION

## 2021-03-16 NOTE — PROGRESS NOTES
[address]  Progress Note    CHIEF COMPLAINT:  Patient presents with:  Back Pain: Patient presents for post T10-11 ITESI Patient reports no change since injection.        History of Present Illness:  Rekha ELLISON • CARPAL TUNNEL RELEASE Right 2014   • CARPAL TUNNEL RELEASE Left 01/2019    [facility].    • CHOLECYSTECTOMY     • COLONOSCOPY  05/2020    normal   • CORRECT BUNION,OTHR METHODS Right    • CYSTOSCOPY RETROGRADE Right 1/14/2021    Performed by Leo Cabrera not apply Misc USE AT BEDTIME 90 each 3   • amLODIPine Besylate 2.5 MG Oral Tab Take 1 tablet (2.5 mg total) by mouth 2 (two) times daily. 180 tablet 1   • Mirabegron ER (MYRBETRIQ) 50 MG Oral Tablet 24 Hr Take by mouth daily.      • Candesartan Cilexetil 8 Body mass index is 39.54 kg/m².       General: No immediate distress  Extremities: No upper extremity edema bilaterally   Neuro:   Cognition: alert & oriented x 3, attentive, comprehention intact, spontaneous speech intact          Data    Radiology [facility] [address]

## 2021-03-17 DIAGNOSIS — Z23 NEED FOR VACCINATION: ICD-10-CM

## 2021-03-22 ENCOUNTER — HOSPITAL ENCOUNTER (OUTPATIENT)
Dept: ULTRASOUND IMAGING | Facility: HOSPITAL | Age: 51
Discharge: HOME | End: 2021-03-22
Attending: UROLOGY
Payer: COMMERCIAL

## 2021-03-22 ENCOUNTER — LAB ENCOUNTER (OUTPATIENT)
Dept: LAB | Facility: HOSPITAL | Age: 51
End: 2021-03-22
Attending: UROLOGY
Payer: COMMERCIAL

## 2021-03-22 ENCOUNTER — LAB ENCOUNTER (OUTPATIENT)
Dept: LAB | Facility: REFERENCE LAB | Age: 51
End: 2021-03-22
Attending: UROLOGY
Payer: COMMERCIAL

## 2021-03-22 DIAGNOSIS — N13.30 HYDRONEPHROSIS: ICD-10-CM

## 2021-03-22 DIAGNOSIS — N13.30 HYDRONEPHROSIS: Primary | ICD-10-CM

## 2021-03-22 LAB — CREAT BLD-MCNC: 1.12 MG/DL

## 2021-03-22 PROCEDURE — 36415 COLL VENOUS BLD VENIPUNCTURE: CPT

## 2021-03-22 PROCEDURE — 82565 ASSAY OF CREATININE: CPT

## 2021-03-22 PROCEDURE — 76775 US EXAM ABDO BACK WALL LIM: CPT | Performed by: UROLOGY

## 2021-03-23 ENCOUNTER — HOSPITAL ENCOUNTER (OUTPATIENT)
Dept: NUCLEAR MEDICINE | Facility: HOSPITAL | Age: 51
Discharge: HOME | End: 2021-03-23
Attending: UROLOGY
Payer: COMMERCIAL

## 2021-03-23 ENCOUNTER — OFFICE VISIT (OUTPATIENT)
Dept: INTERNAL MEDICINE CLINIC | Facility: CLINIC | Age: 51
End: 2021-03-23
Payer: COMMERCIAL

## 2021-03-23 ENCOUNTER — TELEPHONE (OUTPATIENT)
Dept: INTERNAL MEDICINE CLINIC | Facility: CLINIC | Age: 51
End: 2021-03-23

## 2021-03-23 VITALS
SYSTOLIC BLOOD PRESSURE: 120 MMHG | OXYGEN SATURATION: 98 % | HEART RATE: 81 BPM | HEIGHT: 66 IN | TEMPERATURE: 97 F | DIASTOLIC BLOOD PRESSURE: 80 MMHG | WEIGHT: 245.63 LBS | RESPIRATION RATE: 18 BRPM | BODY MASS INDEX: 39.48 KG/M2

## 2021-03-23 DIAGNOSIS — N18.2 CHRONIC KIDNEY DISEASE (CKD) STAGE G2/A2, MILDLY DECREASED GLOMERULAR FILTRATION RATE (GFR) BETWEEN 60-89 ML/MIN/1.73 SQUARE METER AND ALBUMINURIA CREATININE RATIO BETWEEN 30-299 MG/G: Primary | ICD-10-CM

## 2021-03-23 DIAGNOSIS — E11.9 TYPE 2 DIABETES MELLITUS WITHOUT COMPLICATION, WITHOUT LONG-TERM CURRENT USE OF INSULIN (HCC): ICD-10-CM

## 2021-03-23 DIAGNOSIS — N13.30 HYDRONEPHROSIS: ICD-10-CM

## 2021-03-23 DIAGNOSIS — E55.9 VITAMIN D DEFICIENCY: ICD-10-CM

## 2021-03-23 DIAGNOSIS — I10 ESSENTIAL HYPERTENSION: ICD-10-CM

## 2021-03-23 DIAGNOSIS — E03.9 ACQUIRED HYPOTHYROIDISM: ICD-10-CM

## 2021-03-23 DIAGNOSIS — Z71.85 VACCINE COUNSELING: ICD-10-CM

## 2021-03-23 PROBLEM — R14.2 BELCHING SYMPTOM: Status: RESOLVED | Noted: 2018-11-13 | Resolved: 2021-03-23

## 2021-03-23 PROBLEM — N30.00 ACUTE CYSTITIS WITHOUT HEMATURIA: Status: RESOLVED | Noted: 2021-02-09 | Resolved: 2021-03-23

## 2021-03-23 PROBLEM — R10.9 FLANK PAIN: Status: RESOLVED | Noted: 2021-01-25 | Resolved: 2021-03-23

## 2021-03-23 PROCEDURE — 99214 OFFICE O/P EST MOD 30 MIN: CPT | Performed by: INTERNAL MEDICINE

## 2021-03-23 PROCEDURE — 78708 K FLOW/FUNCT IMAGE W/DRUG: CPT | Performed by: UROLOGY

## 2021-03-23 PROCEDURE — 3008F BODY MASS INDEX DOCD: CPT | Performed by: INTERNAL MEDICINE

## 2021-03-23 PROCEDURE — 3079F DIAST BP 80-89 MM HG: CPT | Performed by: INTERNAL MEDICINE

## 2021-03-23 PROCEDURE — 3074F SYST BP LT 130 MM HG: CPT | Performed by: INTERNAL MEDICINE

## 2021-03-23 RX ORDER — FUROSEMIDE 10 MG/ML
40 INJECTION INTRAMUSCULAR; INTRAVENOUS ONCE
Status: COMPLETED | OUTPATIENT
Start: 2021-03-23 | End: 2021-03-23

## 2021-03-23 RX ORDER — INSULIN DETEMIR 100 [IU]/ML
25 INJECTION, SOLUTION SUBCUTANEOUS NIGHTLY
Qty: 45 ML | Refills: 2 | Status: SHIPPED | OUTPATIENT
Start: 2021-03-23 | End: 2021-06-07

## 2021-03-23 RX ORDER — GABAPENTIN 300 MG/1
300 CAPSULE ORAL NIGHTLY
Qty: 30 CAPSULE | Refills: 2 | Status: SHIPPED | OUTPATIENT
Start: 2021-03-23 | End: 2021-06-10

## 2021-03-23 RX ORDER — FUROSEMIDE 10 MG/ML
INJECTION INTRAMUSCULAR; INTRAVENOUS
Status: COMPLETED
Start: 2021-03-23 | End: 2021-03-23

## 2021-03-23 RX ADMIN — FUROSEMIDE 40 MG: 10 INJECTION INTRAMUSCULAR; INTRAVENOUS at 11:07:00

## 2021-03-23 NOTE — PATIENT INSTRUCTIONS
ASSESSMENT/PLAN:   Chronic kidney disease (ckd) stage g2/a2, mildly decreased glomerular filtration rate (gfr) between 60-89 ml/min/1.73 square meter and albuminuria creatinine ratio between  mg/g  (primary encounter diagnosis)No motrin, ibuprof No orders of the defined types were placed in this encounter. Meds This Visit:  Requested Prescriptions     Signed Prescriptions Disp Refills   • gabapentin 300 MG Oral Cap 30 capsule 2     Sig: Take 1 capsule (300 mg total) by mouth nightly.    • i often those with a weak immune system, shingles appear on more than one part of the body at once. 3. After a few days, the blisters become dry and form a crust. The crust falls off in days to weeks. The blisters generally don't leave scars.  But they can i blisters may get infected with bacteria. Depending on the severity of the infection, topical, oral or IV (intravenous) antibiotic medicine is used to treat the infection. · Eye problems.  If you have shingles on the face, see your healthcare provider right be milder than if you hadn’t been vaccinated. Kimberlee Chacon is also advised even if you had the older shingles vaccine in the past. That's because the RZV vaccine works better and protects you from shingles longer.   Talk with your healthcare provider about the best suppress your immune system  · medicines to treat cancer  · steroid medicines like prednisone or cortisone  What if I miss a dose? Keep appointments for follow-up (booster) doses as directed. It is important not to miss your dose.  Call your doctor or heal

## 2021-03-23 NOTE — TELEPHONE ENCOUNTER
Patient is here at the office. Informed her she is do for her mammogram Patient states she schedule for 5/10/2021. She is also due for DM eye exam. Patient states she hasn't done it.

## 2021-03-23 NOTE — IMAGING NOTE
NM RENAL W/LASIX/RODRÍGUEZ    1040 PT IN [facility] FOR RENAL SCAN WITH RODRÍGUEZ AND LASIX, ALLERGIES CONFIRMED, PROCEDURE FOR RODRÍGUEZ EXPLAINED.     [facility] LASIX 40MG IV GIVEN DURING RENAL SCAN

## 2021-03-23 NOTE — PROGRESS NOTES
HPI:    Patient ID: Jacobo Myers is a 44year old female. Patient presents with: Follow - Up: 3 months     Patient here for follow up of Diabetes. Has been taking medications regularly. Checks sugars 3 times daily.   Fasting sugars average 130 CO2 25.0 12/22/2020 08:12 AM    CREATSERUM 1.12 (H) 03/22/2021 04:52 PM    CA 9.5 12/22/2020 08:12 AM    AST 38 (H) 12/22/2020 08:12 AM    ALT 62 (H) 12/22/2020 08:12 AM    TSH 1.560 03/28/2019 06:57 AM        Lab Results   Component Value Date/Time problem has been gradually worsening since onset. The pain is present in the thoracic spine. The quality of the pain is described as aching. The pain does not radiate. Exacerbated by: Better with standing or walking. Feels on that side.   Pertinent negative Outpatient Medications   Medication Sig Dispense Refill   • gabapentin 300 MG Oral Cap Take 1 capsule (300 mg total) by mouth nightly.  30 capsule 2   • insulin detemir (LEVEMIR FLEXTOUCH) 100 UNIT/ML Subcutaneous Solution Pen-injector Inject 25 Units into 1-4-16    Labral tear reapir, iliopsoas burscetomy. (hip)   • ARTHROSCOPY OF JOINT UNLISTED Right     bone spur and torn cartilage shoulder   • CARPAL TUNNEL RELEASE Right 2014   • CARPAL TUNNEL RELEASE Left 01/2019    [facility].    • CHOLECYSTEC Alcohol/week: 0.0 standard drinks      Comment: occassionally     Drug use: No       PHYSICAL EXAM:   /80 (BP Location: Right arm, Patient Position: Sitting, Cuff Size: large)   Pulse 81   Temp 97.3 °F (36.3 °C) (Other)   Resp 18   Ht 5' 6\" (1.67 sounds are normal. There is no distension. Palpations: Abdomen is soft. Abdomen is not rigid. There is no mass. Tenderness: There is no abdominal tenderness. There is no right CVA tenderness, left CVA tenderness, guarding or rebound.    Musculoske severe UTI.). Increase levemir to 25 unist pre bed. Call in 1 week. Careful with diet and excercise at least 30 minutes 3-4 times a week. Check sugars at different times on different dates. Careful with low sugars.  Carry something with you and check sugar

## 2021-03-24 ENCOUNTER — TELEPHONE (OUTPATIENT)
Dept: INTERNAL MEDICINE CLINIC | Facility: CLINIC | Age: 51
End: 2021-03-24

## 2021-03-24 NOTE — TELEPHONE ENCOUNTER
Can we get records of eye exam.  Patient had a full eye exam with [facility] prior to surgery. We need those records. We have consent in chart.

## 2021-03-25 NOTE — TELEPHONE ENCOUNTER
Ermias Lindsay  Patient states exam was [facility] September 2020\"      Kristopher Jacobson reported the following as being completed:    Test name: eye exam  Date test performed: [facility] September 2020\"  Performing Provider/Loc

## 2021-03-25 NOTE — TELEPHONE ENCOUNTER
Message left for Grant Sherman in [facility], Dr. Scar Cabrales's office to please fax us a copy of patients eye exam that she had done in mid September 2020 to our office, fax# given.

## 2021-03-29 RX ORDER — SPIRONOLACTONE 100 MG/1
TABLET, FILM COATED ORAL
Qty: 135 TABLET | Refills: 3 | Status: SHIPPED | OUTPATIENT
Start: 2021-03-29

## 2021-04-17 ENCOUNTER — LAB ENCOUNTER (OUTPATIENT)
Dept: LAB | Facility: REFERENCE LAB | Age: 51
End: 2021-04-17
Attending: INTERNAL MEDICINE
Payer: COMMERCIAL

## 2021-04-17 DIAGNOSIS — N95.1 PERIMENOPAUSE: ICD-10-CM

## 2021-04-17 DIAGNOSIS — E11.9 TYPE 2 DIABETES MELLITUS WITHOUT COMPLICATION, WITHOUT LONG-TERM CURRENT USE OF INSULIN (HCC): ICD-10-CM

## 2021-04-17 DIAGNOSIS — N28.9 RENAL INSUFFICIENCY: ICD-10-CM

## 2021-04-17 PROCEDURE — 83036 HEMOGLOBIN GLYCOSYLATED A1C: CPT

## 2021-04-17 PROCEDURE — 82670 ASSAY OF TOTAL ESTRADIOL: CPT

## 2021-04-17 PROCEDURE — 80053 COMPREHEN METABOLIC PANEL: CPT

## 2021-04-17 PROCEDURE — 83001 ASSAY OF GONADOTROPIN (FSH): CPT

## 2021-04-17 PROCEDURE — 80061 LIPID PANEL: CPT

## 2021-04-17 PROCEDURE — 3052F HG A1C>EQUAL 8.0%<EQUAL 9.0%: CPT | Performed by: INTERNAL MEDICINE

## 2021-04-17 PROCEDURE — 36415 COLL VENOUS BLD VENIPUNCTURE: CPT

## 2021-04-19 ENCOUNTER — PATIENT MESSAGE (OUTPATIENT)
Dept: INTERNAL MEDICINE CLINIC | Facility: CLINIC | Age: 51
End: 2021-04-19

## 2021-04-19 DIAGNOSIS — R07.9 CHEST PAIN, UNSPECIFIED TYPE: Primary | ICD-10-CM

## 2021-04-19 NOTE — TELEPHONE ENCOUNTER
From: Seth Butler  To: Aurelio Lowry. Phil Crawford MD  Sent: 4/19/2021 9:05 AM CDT  Subject: Test Results Adam Crawford-    Saw your notes on my test results and I agree not good.  As for your question on what sugars are running, similar to what

## 2021-04-19 NOTE — PROGRESS NOTES
Telephone Information:  Mobile          [phone]  Mobile          [phone] with pt regarding recent lab results  Pt states she was off [facility] for 30 days before labs    Pt states since being off having HA daily.  Pt wasn't sure what recommendat

## 2021-04-20 ENCOUNTER — PATIENT MESSAGE (OUTPATIENT)
Dept: INTERNAL MEDICINE CLINIC | Facility: CLINIC | Age: 51
End: 2021-04-20

## 2021-04-20 RX ORDER — PEN NEEDLE, DIABETIC 30 GX3/16"
1 NEEDLE, DISPOSABLE MISCELLANEOUS
Qty: 30 EACH | Refills: 0 | Status: SHIPPED | OUTPATIENT
Start: 2021-04-20

## 2021-04-20 RX ORDER — SEMAGLUTIDE 1.34 MG/ML
0.25 INJECTION, SOLUTION SUBCUTANEOUS
Qty: 1 PEN | Refills: 0 | Status: SHIPPED | OUTPATIENT
Start: 2021-04-20 | End: 2021-05-12

## 2021-04-20 NOTE — TELEPHONE ENCOUNTER
1. Stopped the Jardiance a while ago. Wondering if it is worth it to try Ozempic 0.25 once a week with the same dose of insulin given a week or 2 and then if no side effects i.e. nausea etc. with increased 0.5.   Nausea may have been initially but usually

## 2021-04-20 NOTE — TELEPHONE ENCOUNTER
From: Evelin Deluna  To: Ghanshyam Walker. Arnel Rick MD  Sent: 4/20/2021 11:51 AM CDT  Subject: Test Results Question    Dr Arnel Rick-    Following up to your last note. I'm good to try Ozempic and see if it helps.  Assume you'll want to do that through local pharm

## 2021-04-20 NOTE — PROGRESS NOTES
Telephone Information:  Mobile          [phone]  Mobile          [phone]    Pt states she has 3 more sets of the Lo Loestrin she was on. States insurance no longer covering. After completion of this would you recommend the Loestrin?

## 2021-05-12 RX ORDER — SEMAGLUTIDE 1.34 MG/ML
0.5 INJECTION, SOLUTION SUBCUTANEOUS
Qty: 3 PEN | Refills: 1 | Status: SHIPPED | OUTPATIENT
Start: 2021-05-12 | End: 2021-06-07

## 2021-06-08 RX ORDER — SEMAGLUTIDE 1.34 MG/ML
0.5 INJECTION, SOLUTION SUBCUTANEOUS
Qty: 3 EACH | Refills: 1 | Status: SHIPPED | OUTPATIENT
Start: 2021-06-08 | End: 2021-06-10

## 2021-06-08 RX ORDER — INSULIN DETEMIR 100 [IU]/ML
25 INJECTION, SOLUTION SUBCUTANEOUS NIGHTLY
Qty: 45 ML | Refills: 2 | Status: SHIPPED | OUTPATIENT
Start: 2021-06-08 | End: 2022-04-26

## 2021-06-10 RX ORDER — SEMAGLUTIDE 1.34 MG/ML
0.5 INJECTION, SOLUTION SUBCUTANEOUS
Qty: 3 EACH | Refills: 1 | Status: SHIPPED | OUTPATIENT
Start: 2021-06-10 | End: 2021-07-08

## 2021-06-11 ENCOUNTER — HOSPITAL ENCOUNTER (OUTPATIENT)
Dept: CT IMAGING | Facility: HOSPITAL | Age: 51
Discharge: HOME | End: 2021-06-11
Attending: INTERNAL MEDICINE
Payer: COMMERCIAL

## 2021-06-11 DIAGNOSIS — R07.9 CHEST PAIN, UNSPECIFIED TYPE: ICD-10-CM

## 2021-06-11 PROCEDURE — 71260 CT THORAX DX C+: CPT | Performed by: INTERNAL MEDICINE

## 2021-06-11 PROCEDURE — 82565 ASSAY OF CREATININE: CPT

## 2021-06-17 ENCOUNTER — TELEPHONE (OUTPATIENT)
Dept: INTERNAL MEDICINE CLINIC | Facility: CLINIC | Age: 51
End: 2021-06-17

## 2021-06-27 RX ORDER — LEVOTHYROXINE SODIUM 0.1 MG/1
TABLET ORAL
Qty: 90 TABLET | Refills: 2 | Status: SHIPPED | OUTPATIENT
Start: 2021-06-27 | End: 2022-04-30

## 2021-07-02 ENCOUNTER — HOSPITAL ENCOUNTER (OUTPATIENT)
Dept: CT IMAGING | Facility: HOSPITAL | Age: 51
Discharge: HOME | End: 2021-07-02
Attending: UROLOGY
Payer: COMMERCIAL

## 2021-07-02 ENCOUNTER — LAB ENCOUNTER (OUTPATIENT)
Dept: LAB | Facility: HOSPITAL | Age: 51
End: 2021-07-02
Attending: UROLOGY
Payer: COMMERCIAL

## 2021-07-02 DIAGNOSIS — C64.9 ADENOCARCINOMA, RENAL CELL (HCC): Primary | ICD-10-CM

## 2021-07-02 DIAGNOSIS — C64.9 CANCER OF KIDNEY (HCC): ICD-10-CM

## 2021-07-02 DIAGNOSIS — R35.1 NOCTURIA: ICD-10-CM

## 2021-07-02 LAB
CREAT BLD-MCNC: 0.8 MG/DL
CREAT BLD-MCNC: 0.94 MG/DL

## 2021-07-02 PROCEDURE — 82565 ASSAY OF CREATININE: CPT

## 2021-07-02 PROCEDURE — 74178 CT ABD&PLV WO CNTR FLWD CNTR: CPT | Performed by: UROLOGY

## 2021-07-02 PROCEDURE — 36415 COLL VENOUS BLD VENIPUNCTURE: CPT

## 2021-07-11 RX ORDER — CANDESARTAN 8 MG/1
8 TABLET ORAL 2 TIMES DAILY
Qty: 180 TABLET | Refills: 1 | Status: SHIPPED | OUTPATIENT
Start: 2021-07-11 | End: 2021-12-25

## 2021-07-20 ENCOUNTER — TELEPHONE (OUTPATIENT)
Dept: INTERNAL MEDICINE CLINIC | Facility: CLINIC | Age: 51
End: 2021-07-20

## 2021-07-20 RX ORDER — AMLODIPINE BESYLATE 2.5 MG/1
2.5 TABLET ORAL 2 TIMES DAILY
Qty: 180 TABLET | Refills: 0 | Status: SHIPPED | OUTPATIENT
Start: 2021-07-20 | End: 2021-11-03

## 2021-07-20 NOTE — TELEPHONE ENCOUNTER
Pt states she had a diabetic eye exam last month at [address] [facility], she will fax a copy to our office.

## 2021-08-16 LAB
AMB EXT CHOLESTEROL, TOTAL: 148 MG/DL
AMB EXT CMP ALT: 61 U/L
AMB EXT CMP AST: 39 U/L
AMB EXT CREATININE: 1.03 MG/DL
AMB EXT GFR: 56
AMB EXT GLUCOSE URINE: NEGATIVE
AMB EXT GLUCOSE: 165 MG/DL
AMB EXT HDL CHOLESTEROL: 46 MG/DL
AMB EXT HEMATOCRIT: 46.3
AMB EXT HEMOGLOBIN: 15.1
AMB EXT HGBA1C: 7.8 %
AMB EXT KETONES URINE: NEGATIVE
AMB EXT LDL CHOLESTEROL, DIRECT: 82 MG/DL
AMB EXT NITRITE URINE: NEGATIVE
AMB EXT PH URINE: 6
AMB EXT PLATELETS: 235
AMB EXT POSTASSIUM: 4 MMOL/L
AMB EXT PROTEIN URINE: NEGATIVE
AMB EXT SODIUM: 136 MMOL/L
AMB EXT TRIGLYCERIDES: 100 MG/DL
AMB EXT URINE COLOR: YELLOW
AMB EXT URINE SPECIFIC GRAVITY: 1.02
AMB EXT WBC: 8.9 X10(3)UL

## 2021-08-16 PROCEDURE — 3051F HG A1C>EQUAL 7.0%<8.0%: CPT | Performed by: INTERNAL MEDICINE

## 2021-08-25 ENCOUNTER — MED REC SCAN ONLY (OUTPATIENT)
Dept: INTERNAL MEDICINE CLINIC | Facility: CLINIC | Age: 51
End: 2021-08-25

## 2021-08-31 ENCOUNTER — OFFICE VISIT (OUTPATIENT)
Dept: INTERNAL MEDICINE CLINIC | Facility: CLINIC | Age: 51
End: 2021-08-31
Payer: COMMERCIAL

## 2021-08-31 ENCOUNTER — TELEPHONE (OUTPATIENT)
Dept: INTERNAL MEDICINE CLINIC | Facility: CLINIC | Age: 51
End: 2021-08-31

## 2021-08-31 VITALS
DIASTOLIC BLOOD PRESSURE: 76 MMHG | HEIGHT: 66 IN | WEIGHT: 254.63 LBS | RESPIRATION RATE: 17 BRPM | HEART RATE: 68 BPM | BODY MASS INDEX: 40.92 KG/M2 | SYSTOLIC BLOOD PRESSURE: 132 MMHG | TEMPERATURE: 98 F

## 2021-08-31 DIAGNOSIS — Z12.31 ENCOUNTER FOR SCREENING MAMMOGRAM FOR MALIGNANT NEOPLASM OF BREAST: ICD-10-CM

## 2021-08-31 DIAGNOSIS — E55.9 VITAMIN D DEFICIENCY: Primary | ICD-10-CM

## 2021-08-31 DIAGNOSIS — E03.9 ACQUIRED HYPOTHYROIDISM: ICD-10-CM

## 2021-08-31 DIAGNOSIS — E11.9 TYPE 2 DIABETES MELLITUS WITHOUT COMPLICATION, WITHOUT LONG-TERM CURRENT USE OF INSULIN (HCC): ICD-10-CM

## 2021-08-31 DIAGNOSIS — I10 PRIMARY HYPERTENSION: ICD-10-CM

## 2021-08-31 DIAGNOSIS — Z00.00 ADULT GENERAL MEDICAL EXAM: ICD-10-CM

## 2021-08-31 DIAGNOSIS — Z71.85 VACCINE COUNSELING: ICD-10-CM

## 2021-08-31 DIAGNOSIS — N18.2 CHRONIC KIDNEY DISEASE (CKD) STAGE G2/A2, MILDLY DECREASED GLOMERULAR FILTRATION RATE (GFR) BETWEEN 60-89 ML/MIN/1.73 SQUARE METER AND ALBUMINURIA CREATININE RATIO BETWEEN 30-299 MG/G: ICD-10-CM

## 2021-08-31 PROCEDURE — 3078F DIAST BP <80 MM HG: CPT | Performed by: INTERNAL MEDICINE

## 2021-08-31 PROCEDURE — 90750 HZV VACC RECOMBINANT IM: CPT | Performed by: INTERNAL MEDICINE

## 2021-08-31 PROCEDURE — 3075F SYST BP GE 130 - 139MM HG: CPT | Performed by: INTERNAL MEDICINE

## 2021-08-31 PROCEDURE — 90471 IMMUNIZATION ADMIN: CPT | Performed by: INTERNAL MEDICINE

## 2021-08-31 PROCEDURE — 99396 PREV VISIT EST AGE 40-64: CPT | Performed by: INTERNAL MEDICINE

## 2021-08-31 PROCEDURE — 3008F BODY MASS INDEX DOCD: CPT | Performed by: INTERNAL MEDICINE

## 2021-08-31 RX ORDER — FLASH GLUCOSE SENSOR
KIT MISCELLANEOUS
COMMUNITY
Start: 2021-08-16

## 2021-08-31 RX ORDER — FLASH GLUCOSE SCANNING READER
EACH MISCELLANEOUS
COMMUNITY
Start: 2021-08-16

## 2021-08-31 NOTE — PROGRESS NOTES
HPI:    Patient ID: Viviana Mohan is a 44year old female. Viviana Mohan is a 44year old female who presents for a complete physical exam.   HPI:   Patient presents with:  Physical: annual exam       No LMP recorded.  Patient is premen apply Misc      • amLODIPine Besylate 2.5 MG Oral Tab Take 1 tablet (2.5 mg total) by mouth 2 (two) times daily. 180 tablet 0   • Candesartan Cilexetil 8 MG Oral Tab Take 1 tablet (8 mg total) by mouth 2 (two) times daily.  180 tablet 1   • Levothyroxine So arousing       Past Surgical History:   Procedure Laterality Date   • APPENDECTOMY  2008   • ARTHROSCOPY OF JOINT UNLISTED Left 1-4-16    Labral tear reapir, iliopsoas burscetomy.  (hip)   • ARTHROSCOPY OF JOINT UNLISTED Right     bone spur and torn cartila Topics      Concerns:        Caffeine Concern: Yes          5 8oz of coffee and soda daily        Exercise: No        OB History     T0    L0    SAB0  TAB0  Ectopic0  Multiple0  Live Births0      Hx of Pap: all Paps normal         Patient here closed up again. Does do golf. But will try to get back into the gym.   Wt Readings from Last 3 Encounters:  08/31/21 : 254 lb 9.6 oz (115.5 kg)  03/23/21 : 245 lb 9.6 oz (111.4 kg)  03/16/21 : 245 lb (111.1 kg)    BP Readings from Last 3 Encounters:  08/ discharge, redness, itching and visual disturbance. Respiratory: Negative for apnea, cough, choking, chest tightness, shortness of breath, wheezing and stridor. Cardiovascular: Negative for chest pain, palpitations and leg swelling.    Gastrointestinal total) by mouth 2 (two) times daily.  180 tablet 1   • Levothyroxine Sodium 100 MCG Oral Tab TAKE 1 TABLET BEFORE BREAKFAST 90 tablet 2   • insulin detemir (LEVEMIR FLEXTOUCH) 100 UNIT/ML Subcutaneous Solution Pen-injector Inject 25 Units into the skin tino ARTHROSCOPY OF JOINT UNLISTED Left 1-4-16    Labral tear reapir, iliopsoas burscetomy.  (hip)   • ARTHROSCOPY OF JOINT UNLISTED Right     bone spur and torn cartilage shoulder   • CARPAL TUNNEL RELEASE Right 2014   • CARPAL TUNNEL RELEASE Left 01/2019    Hi kg)      Physical Exam  Vitals and nursing note reviewed. Constitutional:       General: She is not in acute distress. Appearance: Normal appearance. She is well-developed and well-groomed. She is not ill-appearing, toxic-appearing or diaphoretic. conjunctiva is not injected. No chemosis, exudate or hemorrhage. Pupils: Pupils are equal, round, and reactive to light. Neck:      Thyroid: No thyroid mass, thyromegaly or thyroid tenderness. Vascular: Normal carotid pulses.  No carotid bruit, h preauricular, posterior auricular or occipital adenopathy. Cervical: No cervical adenopathy. Right cervical: No superficial, deep or posterior cervical adenopathy. Left cervical: No superficial, deep or posterior cervical adenopathy.       Azalia Polio diet and excercise at least 30 minutes 3-4 times a week. Check blood pressures at different times on different days. Can purchase own blood pressure monitor. If not, check at local pharmacy. Bake foods more and grill occasionally. Avoid fried foods.  No sal

## 2021-08-31 NOTE — PATIENT INSTRUCTIONS
ASSESSMENT/PLAN:   Vitamin d deficiency  (primary encounter diagnosis) Stable. Vaccine counseling Shngrix #1 today. 2.  Return to clinic in 3 months.     Type 2 diabetes mellitus without complication, without long-term current use of insulin (hcc) No Creatinine, Serum      Comp Metabolic Panel (14)      Urinalysis, Routine      ZOSTER VACC RECOMBINANT IM NJX      Meds This Visit:  Requested Prescriptions      No prescriptions requested or ordered in this encounter       Imaging & Referrals:  ZOSTER VAC falls off in days to weeks. The blisters generally don't leave scars. But they can in severe cases. Or if someone has a weak immune system. How is shingles treated? For most people, shingles heals on its own in a few days or weeks.  But treatment is adv Eye problems. If you have shingles on the face, see your healthcare provider right away. Shingles can cause serious problems with vision, and even blindness.   In very rare cases, shingles can also lead to pneumonia, hearing problems, brain inflammation, or protects you from shingles longer. Talk with your healthcare provider about the best time to get vaccinated. Ask which vaccine is best for you based on your age and health conditions.   Anamaria last reviewed this educational content on 6/1/2019  © 2000-20 (booster) doses as directed. It is important not to miss your dose. Call your doctor or health care professional if you are unable to keep an appointment. Where should I keep my medicine?   This vaccine is only given in a clinic, pharmacy, doctor's office,

## 2021-09-01 NOTE — TELEPHONE ENCOUNTER
Patient needs a return visit in 3 months with her shingles inj done at the that time as well. No available appointments. Please advise and contact patient to schedule.

## 2021-09-02 NOTE — TELEPHONE ENCOUNTER
Can we check with patient what time she is available and I will see if we can squeeze her in at certain times.

## 2021-09-14 NOTE — INTERVAL H&P NOTE
Pre-op Diagnosis: ureteral stricture    The above referenced H&P was reviewed by Sara Miranda MD on 1/14/2021, the patient was examined and no significant changes have occurred in the patient's condition since the H&P was performed.   I discussed with Fall Risk

## 2021-10-10 RX ORDER — ATENOLOL 25 MG/1
25 TABLET ORAL NIGHTLY
Qty: 90 TABLET | Refills: 2 | Status: SHIPPED | OUTPATIENT
Start: 2021-10-10 | End: 2022-06-14

## 2021-11-03 RX ORDER — AMLODIPINE BESYLATE 2.5 MG/1
2.5 TABLET ORAL 2 TIMES DAILY
Qty: 180 TABLET | Refills: 1 | Status: SHIPPED | OUTPATIENT
Start: 2021-11-03

## 2021-11-03 NOTE — TELEPHONE ENCOUNTER
Refill passed per [facility] protocol    Requested Prescriptions   Pending Prescriptions Disp Refills    AMLODIPINE 2.5 MG Oral Tab [Pharmacy Med Name: AMLODIPINE BESYLATE TABS 2.5MG] 180 tablet 3     Sig: TAKE 1 TABLET TWICE A DAY        Hypertensive

## 2021-12-02 ENCOUNTER — OFFICE VISIT (OUTPATIENT)
Dept: INTERNAL MEDICINE CLINIC | Facility: CLINIC | Age: 51
End: 2021-12-02
Payer: COMMERCIAL

## 2021-12-02 VITALS
WEIGHT: 255.38 LBS | RESPIRATION RATE: 16 BRPM | BODY MASS INDEX: 41.04 KG/M2 | SYSTOLIC BLOOD PRESSURE: 109 MMHG | DIASTOLIC BLOOD PRESSURE: 71 MMHG | HEIGHT: 66 IN | HEART RATE: 63 BPM | TEMPERATURE: 98 F

## 2021-12-02 DIAGNOSIS — E11.9 TYPE 2 DIABETES MELLITUS WITHOUT COMPLICATION, WITHOUT LONG-TERM CURRENT USE OF INSULIN (HCC): Primary | ICD-10-CM

## 2021-12-02 DIAGNOSIS — Z71.85 VACCINE COUNSELING: ICD-10-CM

## 2021-12-02 DIAGNOSIS — I10 PRIMARY HYPERTENSION: ICD-10-CM

## 2021-12-02 PROCEDURE — 3074F SYST BP LT 130 MM HG: CPT | Performed by: INTERNAL MEDICINE

## 2021-12-02 PROCEDURE — 3051F HG A1C>EQUAL 7.0%<8.0%: CPT | Performed by: INTERNAL MEDICINE

## 2021-12-02 PROCEDURE — 3078F DIAST BP <80 MM HG: CPT | Performed by: INTERNAL MEDICINE

## 2021-12-02 PROCEDURE — 3008F BODY MASS INDEX DOCD: CPT | Performed by: INTERNAL MEDICINE

## 2021-12-02 PROCEDURE — 3061F NEG MICROALBUMINURIA REV: CPT | Performed by: INTERNAL MEDICINE

## 2021-12-02 PROCEDURE — 36415 COLL VENOUS BLD VENIPUNCTURE: CPT | Performed by: INTERNAL MEDICINE

## 2021-12-02 PROCEDURE — 99213 OFFICE O/P EST LOW 20 MIN: CPT | Performed by: INTERNAL MEDICINE

## 2021-12-02 NOTE — PROGRESS NOTES
HPI:    Patient ID: Miguel Cisneros is a 44year old female. Patient presents with:  Diabetes: follow up. Patient here for follow up of Diabetes. Has been taking medications regularly. Checks sugars 1 times daily.   Fasting sugars average 08/16/2021 12:00 AM     04/17/2021 10:26 AM    K 4 08/16/2021 12:00 AM     04/17/2021 10:26 AM    CO2 27.0 04/17/2021 10:26 AM    CREATSERUM 1.03 08/16/2021 12:00 AM    CA 8.8 04/17/2021 10:26 AM    AST 39 08/16/2021 12:00 AM    ALT 61 08/16/20 asymmetry, speech difficulty, weakness, light-headedness, numbness and headaches. All other systems reviewed and are negative.         Current Outpatient Medications   Medication Sig Dispense Refill   • amLODIPine 2.5 MG Oral Tab Take 1 tablet (2.5 mg tot every 7 days for 28 days.  3 each 1     Allergies:  Lisinopril              Coughing  Metformin               DIARRHEA    HISTORY:  Past Medical History:   Diagnosis Date   • Calculus of kidney    • [facility]     kidney   • Diabetes ([facility]    • Disorder o Alcohol/week: 0.0 standard drinks      Comment: occassionally     Drug use: No       PHYSICAL EXAM:   /71 (BP Location: Right arm, Patient Position: Sitting, Cuff Size: large)   Pulse 63   Temp 98 °F (36.7 °C) (Tympanic)   Resp 16   Ht 5' 6\" (1.676 Musculoskeletal:      Right lower leg: No edema. Left lower leg: No edema. Skin:     General: Skin is warm and dry. Neurological:      Mental Status: She is alert and oriented to person, place, and time.    Psychiatric:         Behavior: Behavior Hemoglobin A1C      ZOSTER VACC RECOMBINANT IM NJX      Meds This Visit:  Requested Prescriptions      No prescriptions requested or ordered in this encounter       Imaging & Referrals:  ZOSTER VACC RECOMBINANT IM NJX     RTC 4 months for FU.

## 2021-12-02 NOTE — PATIENT INSTRUCTIONS
ASSESSMENT/PLAN:   Type 2 diabetes mellitus without complication, without long-term current use of insulin (hcc)  (primary encounter diagnosis) Better. Check blood. Careful with diet and excercise at least 30 minutes 3-4 times a week.  Check sugars at diff

## 2021-12-18 ENCOUNTER — LAB ENCOUNTER (OUTPATIENT)
Dept: LAB | Facility: HOSPITAL | Age: 51
End: 2021-12-18
Attending: PODIATRIST
Payer: COMMERCIAL

## 2021-12-18 DIAGNOSIS — M72.2 PLANTAR FASCIAL FIBROMATOSIS: Primary | ICD-10-CM

## 2021-12-18 PROCEDURE — 85025 COMPLETE CBC W/AUTO DIFF WBC: CPT

## 2021-12-18 PROCEDURE — 36415 COLL VENOUS BLD VENIPUNCTURE: CPT

## 2021-12-18 PROCEDURE — 80048 BASIC METABOLIC PNL TOTAL CA: CPT

## 2021-12-20 ENCOUNTER — LAB REQUISITION (OUTPATIENT)
Dept: SURGERY | Age: 51
End: 2021-12-20
Payer: COMMERCIAL

## 2021-12-20 DIAGNOSIS — Z01.818 PREOP EXAMINATION: ICD-10-CM

## 2021-12-25 NOTE — TELEPHONE ENCOUNTER
Refill passed per [facility] protocol    Requested Prescriptions   Pending Prescriptions Disp Refills    CANDESARTAN CILEXETIL 8 MG Oral Tab [Pharmacy Med Name: CANDESARTAN TABS 8MG] 180 tablet 3     Sig: TAKE 1 TABLET TWICE A DAY        Hypertensive Medications Protocol Passed - 12/24/2021  7:30 PM        Passed - CMP or BMP in past 12 months        Passed - Appointment in past 6 or next 3 months        Passed - GFR Non- > 50     Lab Results   Component Value Date    [place] [id number] 12/18/2021                       Future Appointments         Provider Department Appt Notes    In 2 days [address], [address] [facility] [address] 2nd Shingrix (Order in [place])    In 4 days [facility] quality scan of kidneys    In 1 month [address] 2 Radiology - [place]     In 4 months Elan Teague MD [place] [employer] [facility], [address] [facility]             Recent Outpatient Visits              3 weeks ago Type 2 diabetes mellitus without complication, without long-term current use of insulin [facility])    [place] [employer] [facility], [address], Renu Hart MD    Office Visit    3 months ago Vitamin D deficiency    Renu Sofia MD    Office Visit    9 months ago Chronic kidney disease (CKD) stage G2/A2, mildly decreased glomerular filtration rate (GFR) between 60-89 mL/min/1.73 square meter and albuminuria creatinine ratio between  mg/g    [place] [employer] [facility], [address], Renu Nickerson., MD    Office Visit    9 months ago Thoracic stenosis    [facility] Joss Blackwood MD    Office Visit    9 months ago Encounter for gynecological examination without abnormal finding    OB-GYN - Alice Reyes MD    Office Visit

## 2021-12-26 RX ORDER — CANDESARTAN 8 MG/1
8 TABLET ORAL 2 TIMES DAILY
Qty: 180 TABLET | Refills: 1 | Status: SHIPPED | OUTPATIENT
Start: 2021-12-26 | End: 2022-06-01

## 2021-12-27 ENCOUNTER — NURSE ONLY (OUTPATIENT)
Dept: INTERNAL MEDICINE CLINIC | Facility: CLINIC | Age: 51
End: 2021-12-27
Payer: COMMERCIAL

## 2021-12-27 DIAGNOSIS — Z23 NEED FOR SHINGLES VACCINE: Primary | ICD-10-CM

## 2021-12-27 PROCEDURE — 90750 HZV VACC RECOMBINANT IM: CPT | Performed by: INTERNAL MEDICINE

## 2021-12-27 PROCEDURE — 90471 IMMUNIZATION ADMIN: CPT | Performed by: INTERNAL MEDICINE

## 2021-12-27 NOTE — PROGRESS NOTES
Pt present to office for Shingles (2nd) vaccine, Name and  verified, Injection given left deltoid, Pt showed no reaction to injection

## 2021-12-29 ENCOUNTER — HOSPITAL ENCOUNTER (OUTPATIENT)
Dept: ULTRASOUND IMAGING | Facility: HOSPITAL | Age: 51
Discharge: HOME | End: 2021-12-29
Attending: UROLOGY
Payer: COMMERCIAL

## 2021-12-29 ENCOUNTER — LAB ENCOUNTER (OUTPATIENT)
Dept: LAB | Facility: HOSPITAL | Age: 51
End: 2021-12-29
Attending: UROLOGY
Payer: COMMERCIAL

## 2021-12-29 DIAGNOSIS — Z85.528 HISTORY OF RENAL CELL CARCINOMA: ICD-10-CM

## 2021-12-29 DIAGNOSIS — D49.519 KIDNEY TUMOR: ICD-10-CM

## 2021-12-29 DIAGNOSIS — D30.01: Primary | ICD-10-CM

## 2021-12-29 DIAGNOSIS — Z85.528 PERSONAL HISTORY OF KIDNEY CANCER: ICD-10-CM

## 2021-12-29 DIAGNOSIS — C64.1 RENAL CELL CARCINOMA, RIGHT (HCC): ICD-10-CM

## 2021-12-29 LAB — CREAT BLD-MCNC: 1 MG/DL

## 2021-12-29 PROCEDURE — 36415 COLL VENOUS BLD VENIPUNCTURE: CPT

## 2021-12-29 PROCEDURE — 82565 ASSAY OF CREATININE: CPT

## 2021-12-29 PROCEDURE — 76775 US EXAM ABDO BACK WALL LIM: CPT | Performed by: UROLOGY

## 2022-03-20 RX ORDER — SPIRONOLACTONE 100 MG/1
TABLET, FILM COATED ORAL
Qty: 135 TABLET | Refills: 1 | Status: SHIPPED | OUTPATIENT
Start: 2022-03-20 | End: 2022-08-29

## 2022-03-20 NOTE — TELEPHONE ENCOUNTER
Refill passed per [facility] protocol. Unable to refill per protocol due to high drug interaction warning. High    Drug-Drug: spironolactone and Candesartan Cilexetil  The risk of hyperkalemia may be increased when potassium-sparing diuretics are co-administered with angiotensin II receptor antagonists.     Details             Requested Prescriptions   Pending Prescriptions Disp Refills    SPIRONOLACTONE 100 MG Oral Tab [Pharmacy Med Name: SPIRONOLACTONE TABS 100MG] 135 tablet 3     Sig: TAKE 1 TABLET EVERY MORNING AND ONE-HALF (1/2) TABLET IN THE EVENING        Hypertensive Medications Protocol Passed - 3/19/2022  4:00 PM        Passed - CMP or BMP in past 12 months        Passed - Appointment in past 6 or next 3 months        Passed - GFR Non- > 50     Lab Results   Component Value Date    GFRNAA 64 12/29/2021                       Recent Outpatient Visits              2 months ago Need for shingles vaccine    [facility], [address], [facility]    Nurse Only    3 months ago Type 2 diabetes mellitus without complication, without long-term current use of insulin ([facility]    [facility], [address], Faby Dalton MD    Office Visit    6 months ago Vitamin D deficiency    [facility], Faby Dalton MD    Office Visit    12 months ago Chronic kidney disease (CKD) stage G2/A2, mildly decreased glomerular filtration rate (GFR) between 60-89 mL/min/1.73 square meter and albuminuria creatinine ratio between  mg/g    [facility], [address], Faby Dalton MD    Office Visit    1 year ago Thoracic stenosis    [facility] Leticia Nam MD    Office Visit            Future Appointments         Provider Department Appt Notes    In 3 weeks Chas Escalona MD [address] [facility] [address], [place] annual exam/bcbs ppo/elm loc    In 1 month Milagro Townsend MD [facility], [address], [place] [address] [place] [address]

## 2022-03-26 ENCOUNTER — HOSPITAL ENCOUNTER (OUTPATIENT)
Age: 52
Discharge: HOME | End: 2022-03-26
Payer: COMMERCIAL

## 2022-03-26 VITALS
HEART RATE: 73 BPM | DIASTOLIC BLOOD PRESSURE: 75 MMHG | SYSTOLIC BLOOD PRESSURE: 118 MMHG | TEMPERATURE: 98 F | OXYGEN SATURATION: 97 % | RESPIRATION RATE: 18 BRPM

## 2022-03-26 DIAGNOSIS — J06.9 VIRAL URI WITH COUGH: Primary | ICD-10-CM

## 2022-03-26 PROCEDURE — 99213 OFFICE O/P EST LOW 20 MIN: CPT | Performed by: NURSE PRACTITIONER

## 2022-03-26 RX ORDER — PREDNISONE 20 MG/1
40 TABLET ORAL DAILY
Qty: 10 TABLET | Refills: 0 | Status: SHIPPED | OUTPATIENT
Start: 2022-03-26 | End: 2022-03-31

## 2022-04-26 ENCOUNTER — OFFICE VISIT (OUTPATIENT)
Dept: INTERNAL MEDICINE CLINIC | Facility: CLINIC | Age: 52
End: 2022-04-26
Payer: COMMERCIAL

## 2022-04-26 VITALS
RESPIRATION RATE: 16 BRPM | OXYGEN SATURATION: 98 % | WEIGHT: 259.19 LBS | TEMPERATURE: 98 F | DIASTOLIC BLOOD PRESSURE: 72 MMHG | SYSTOLIC BLOOD PRESSURE: 118 MMHG | HEART RATE: 87 BPM | HEIGHT: 66 IN | BODY MASS INDEX: 41.66 KG/M2

## 2022-04-26 DIAGNOSIS — E55.9 VITAMIN D DEFICIENCY: ICD-10-CM

## 2022-04-26 DIAGNOSIS — I10 PRIMARY HYPERTENSION: ICD-10-CM

## 2022-04-26 DIAGNOSIS — Z12.31 ENCOUNTER FOR SCREENING MAMMOGRAM FOR MALIGNANT NEOPLASM OF BREAST: ICD-10-CM

## 2022-04-26 DIAGNOSIS — Z71.85 VACCINE COUNSELING: ICD-10-CM

## 2022-04-26 DIAGNOSIS — N18.2 CHRONIC KIDNEY DISEASE (CKD) STAGE G2/A2, MILDLY DECREASED GLOMERULAR FILTRATION RATE (GFR) BETWEEN 60-89 ML/MIN/1.73 SQUARE METER AND ALBUMINURIA CREATININE RATIO BETWEEN 30-299 MG/G: Primary | ICD-10-CM

## 2022-04-26 DIAGNOSIS — E11.9 TYPE 2 DIABETES MELLITUS WITHOUT COMPLICATION, WITHOUT LONG-TERM CURRENT USE OF INSULIN (HCC): ICD-10-CM

## 2022-04-26 DIAGNOSIS — E03.9 ACQUIRED HYPOTHYROIDISM: ICD-10-CM

## 2022-04-26 LAB
EST. AVERAGE GLUCOSE BLD GHB EST-MCNC: 163 MG/DL (ref 68–126)
HBA1C MFR BLD: 7.3 % (ref ?–5.7)
TSI SER-ACNC: 0.89 MIU/ML (ref 0.36–3.74)
VIT D+METAB SERPL-MCNC: 26 NG/ML (ref 30–100)

## 2022-04-26 PROCEDURE — 3078F DIAST BP <80 MM HG: CPT | Performed by: INTERNAL MEDICINE

## 2022-04-26 PROCEDURE — 3051F HG A1C>EQUAL 7.0%<8.0%: CPT | Performed by: INTERNAL MEDICINE

## 2022-04-26 PROCEDURE — 3008F BODY MASS INDEX DOCD: CPT | Performed by: INTERNAL MEDICINE

## 2022-04-26 PROCEDURE — 3074F SYST BP LT 130 MM HG: CPT | Performed by: INTERNAL MEDICINE

## 2022-04-26 PROCEDURE — 36415 COLL VENOUS BLD VENIPUNCTURE: CPT | Performed by: INTERNAL MEDICINE

## 2022-04-26 PROCEDURE — 99214 OFFICE O/P EST MOD 30 MIN: CPT | Performed by: INTERNAL MEDICINE

## 2022-04-26 RX ORDER — SEMAGLUTIDE 1.34 MG/ML
1 INJECTION, SOLUTION SUBCUTANEOUS
Qty: 5 EACH | Refills: 1 | Status: SHIPPED | OUTPATIENT
Start: 2022-04-26 | End: 2022-04-27 | Stop reason: DRUGHIGH

## 2022-04-26 RX ORDER — INSULIN DETEMIR 100 [IU]/ML
31 INJECTION, SOLUTION SUBCUTANEOUS NIGHTLY
Qty: 45 ML | Refills: 2 | Status: SHIPPED | OUTPATIENT
Start: 2022-04-26

## 2022-04-26 RX ORDER — FLASH GLUCOSE SENSOR
KIT MISCELLANEOUS
Qty: 7 EACH | Refills: 3 | Status: SHIPPED | OUTPATIENT
Start: 2022-04-26

## 2022-04-27 ENCOUNTER — PATIENT MESSAGE (OUTPATIENT)
Dept: INTERNAL MEDICINE CLINIC | Facility: CLINIC | Age: 52
End: 2022-04-27

## 2022-04-27 RX ORDER — PEN NEEDLE, DIABETIC 30 GX3/16"
1 NEEDLE, DISPOSABLE MISCELLANEOUS
Qty: 30 EACH | Refills: 1 | Status: SHIPPED | OUTPATIENT
Start: 2022-04-27

## 2022-04-27 RX ORDER — SEMAGLUTIDE 1.34 MG/ML
1 INJECTION, SOLUTION SUBCUTANEOUS
Qty: 6 EACH | Refills: 1 | Status: SHIPPED | OUTPATIENT
Start: 2022-04-27

## 2022-04-27 NOTE — TELEPHONE ENCOUNTER
From: Russ Joseph  To: Shanta Figueredo. Chris Mejia MD  Sent: 4/27/2022 11:27 AM CDT  Subject: Ozempic Refill to wrong pharmacy and wrong dosage    Hi Dr Myrtle Bower sent me a message that [facility] is currently out of stock, caused me to look at the order. You ordered the 0.25/0.5mg quantity but I've been taking 1.0mg. Did you mean to change the dosage or was the 0.25/0.5 sent in error? If in error, would you please send the corrected dosage to [facility] rather than [facility]?   Thanks  [facility]

## 2022-04-27 NOTE — PROGRESS NOTES
Thyroid is good. Vitamin D level is lower than prior. Goal is between 30-60. Would recommend increasing vitamin D to 400 twice a day over-the-counter and recheck vitamin D level in 3 months. Orders written for future labs in 3 months. Hemoglobin A1c which is a 3-month average of sugars is slightly better than prior but still elevated. Goal is 6.5 or less. Recheck in 3 months. Call in 2 weeks with sugars. Orders written for future labs in 3 months.

## 2022-04-30 RX ORDER — AMLODIPINE BESYLATE 2.5 MG/1
2.5 TABLET ORAL 2 TIMES DAILY
Qty: 180 TABLET | Refills: 1 | Status: SHIPPED | OUTPATIENT
Start: 2022-04-30 | End: 2022-10-25

## 2022-04-30 RX ORDER — LEVOTHYROXINE SODIUM 0.1 MG/1
TABLET ORAL
Qty: 90 TABLET | Refills: 1 | Status: SHIPPED | OUTPATIENT
Start: 2022-04-30 | End: 2022-10-10

## 2022-04-30 NOTE — TELEPHONE ENCOUNTER
Refill passed per [employer] [facility] protocol.     Requested Prescriptions   Pending Prescriptions Disp Refills    AMLODIPINE 2.5 MG Oral Tab [Pharmacy Med Name: AMLODIPINE BESYLATE TABS 2.5MG] 180 tablet 3     Sig: TAKE 1 TABLET TWICE A DAY        Hypertensive Medications Protocol Passed - 4/30/2022 10:58 AM        Passed - CMP or BMP in past 12 months        Passed - Appointment in past 6 or next 3 months        Passed - GFR Non- > 50     Lab Results   Component Value Date    GFRNAA 64 12/29/2021                     Recent Outpatient Visits              4 days ago Chronic kidney disease (CKD) stage G2/A2, mildly decreased glomerular filtration rate (GFR) between 60-89 mL/min/1.73 square meter and albuminuria creatinine ratio between  mg/g    [place] [email or web address] [facility], [address], Bynum Gitelman., MD    Office Visit    4 months ago Need for shingles vaccine    [facility], [address], [employer]    Nurse Only    4 months ago Type 2 diabetes mellitus without complication, without long-term current use of insulin ([address]    [place] [email or web address] [facility], [address], Bynum Gitelman., MD    Office Visit    8 months ago Vitamin D deficiency    [address] [facility], Bynum Gitelman., MD    Office Visit    1 year ago Chronic kidney disease (CKD) stage G2/A2, mildly decreased glomerular filtration rate (GFR) between 60-89 mL/min/1.73 square meter and albuminuria creatinine ratio between  mg/g    [address] [facility], Bynum Gitelman., MD    Office Visit          Future Appointments         Provider Department Appt Notes    In 3 weeks [facility] Annual scan of thyroid nodules    In 3 months Santosh Lima  [facility], [place] [address] [address]

## 2022-04-30 NOTE — TELEPHONE ENCOUNTER
Refill passed per [facility] protocol.     Requested Prescriptions   Pending Prescriptions Disp Refills    LEVOTHYROXINE 100 MCG Oral Tab [Pharmacy Med Name: L-THYROXINE (SYNTHROID) TABS 100MCG] 90 tablet 3     Sig: TAKE 1 TABLET BEFORE BREAKFAST        Thyroid Medication Protocol Passed - 4/30/2022 10:59 AM        Passed - TSH in past 12 months        Passed - Last TSH value is normal     Lab Results   Component Value Date    TSH 0.893 04/26/2022    THYROIDFUNC 1.59 06/22/2016                 Passed - Appointment in past 12 or next 3 months            Recent Outpatient Visits              4 days ago Chronic kidney disease (CKD) stage G2/A2, mildly decreased glomerular filtration rate (GFR) between 60-89 mL/min/1.73 square meter and albuminuria creatinine ratio between  mg/g    [place], [place], Collin Gonzalez MD    Office Visit    4 months ago Need for shingles vaccine    [place], [place], [address]    Nurse Only    4 months ago Type 2 diabetes mellitus without complication, without long-term current use of insulin ([facility] [address]    [place], [place], Collin Gonzalez MD    Office Visit    8 months ago Vitamin D deficiency    Collin Urrutia MD    Office Visit    1 year ago Chronic kidney disease (CKD) stage G2/A2, mildly decreased glomerular filtration rate (GFR) between 60-89 mL/min/1.73 square meter and albuminuria creatinine ratio between  mg/g    Collin Urrutia MD    Office Visit          Future Appointments         Provider Department Appt Notes    In 3 weeks [address] [facility] Annual scan of thyroid nodules    In 3 months MD Doretha Malik [place] [address]

## 2022-05-31 ENCOUNTER — HOSPITAL ENCOUNTER (OUTPATIENT)
Dept: ULTRASOUND IMAGING | Facility: HOSPITAL | Age: 52
Discharge: HOME | End: 2022-05-31
Attending: INTERNAL MEDICINE
Payer: COMMERCIAL

## 2022-05-31 DIAGNOSIS — E03.9 ACQUIRED HYPOTHYROIDISM: ICD-10-CM

## 2022-05-31 PROCEDURE — 76536 US EXAM OF HEAD AND NECK: CPT | Performed by: INTERNAL MEDICINE

## 2022-06-01 PROBLEM — E04.2 MULTINODULAR GOITER: Status: ACTIVE | Noted: 2022-06-01

## 2022-06-01 RX ORDER — CANDESARTAN 8 MG/1
8 TABLET ORAL 2 TIMES DAILY
Qty: 180 TABLET | Refills: 1 | Status: SHIPPED | OUTPATIENT
Start: 2022-06-01 | End: 2022-11-29

## 2022-06-01 NOTE — TELEPHONE ENCOUNTER
Refill passed per [address] protocol. Routing to Dr. Ye Vargas to review HIGH interaction/allergy warning.    Requested Prescriptions   Pending Prescriptions Disp Refills    CANDESARTAN CILEXETIL 8 MG Oral Tab [Pharmacy Med Name: CANDESARTAN TABS 8MG] 180 tablet 3     Sig: TAKE 1 TABLET TWICE A DAY        Hypertensive Medications Protocol Passed - 5/31/2022 11:59 PM        Passed - CMP or BMP in past 12 months        Passed - Appointment in past 6 or next 3 months        Passed - GFR Non- > 50     Lab Results   Component Value Date    GFRNAA 64 12/29/2021                      Recent Outpatient Visits              1 month ago Chronic kidney disease (CKD) stage G2/A2, mildly decreased glomerular filtration rate (GFR) between 60-89 mL/min/1.73 square meter and albuminuria creatinine ratio between  mg/g    [address] [place], [address], Dafne Martinez MD    Office Visit    5 months ago Need for shingles vaccine    [address] [place], [address], [place]    Nurse Only    6 months ago Type 2 diabetes mellitus without complication, without long-term current use of insulin ([facility] [address]    [address] [place], [address], Dafne Martinez MD    Office Visit    9 months ago Vitamin D deficiency    Dafne Cochran MD    Office Visit    1 year ago Chronic kidney disease (CKD) stage G2/A2, mildly decreased glomerular filtration rate (GFR) between 60-89 mL/min/1.73 square meter and albuminuria creatinine ratio between  mg/g    [address] [place], [address], Dafne Martinez MD    Office Visit           Future Appointments         Provider Department Appt Notes    In 2 months MD David Castro [place] [address]

## 2022-06-14 RX ORDER — ATENOLOL 25 MG/1
25 TABLET ORAL NIGHTLY
Qty: 90 TABLET | Refills: 1 | Status: SHIPPED | OUTPATIENT
Start: 2022-06-14

## 2022-06-14 NOTE — TELEPHONE ENCOUNTER
Refill passed per [employer] [facility] protocol.   Requested Prescriptions   Pending Prescriptions Disp Refills    ATENOLOL 25 MG Oral Tab [Pharmacy Med Name: ATENOLOL TABS 25MG] 90 tablet 3     Sig: TAKE 1 TABLET NIGHTLY        Hypertensive Medications Protocol Passed - 6/14/2022 12:17 AM        Passed - CMP or BMP in past 12 months        Passed - Appointment in past 6 or next 3 months        Passed - GFR Non- > 50     Lab Results   Component Value Date    GFRNAA 64 12/29/2021                     Recent Outpatient Visits              1 month ago Chronic kidney disease (CKD) stage G2/A2, mildly decreased glomerular filtration rate (GFR) between 60-89 mL/min/1.73 square meter and albuminuria creatinine ratio between  mg/g    [facility], [address], Waldo Ng MD    Office Visit    5 months ago Need for shingles vaccine    [place] [employer] [facility], [address], [place]    Nurse Only    6 months ago Type 2 diabetes mellitus without complication, without long-term current use of insulin ([facility] [address] [facility]    [facility], [address], Waldo Ng MD    Office Visit    9 months ago Vitamin D deficiency    Waldo Hernandez MD    Office Visit    1 year ago Chronic kidney disease (CKD) stage G2/A2, mildly decreased glomerular filtration rate (GFR) between 60-89 mL/min/1.73 square meter and albuminuria creatinine ratio between  mg/g    [facility], [address], Waldo Ng MD    Office Visit          Future Appointments         Provider Department Appt Notes    In 2 months MD Chase Villarreal [place] [address]

## 2022-07-15 ENCOUNTER — HOSPITAL ENCOUNTER (OUTPATIENT)
Dept: MRI IMAGING | Facility: HOSPITAL | Age: 52
Discharge: HOME | End: 2022-07-15
Attending: UROLOGY
Payer: COMMERCIAL

## 2022-07-15 ENCOUNTER — LAB ENCOUNTER (OUTPATIENT)
Dept: LAB | Facility: HOSPITAL | Age: 52
End: 2022-07-15
Attending: UROLOGY
Payer: COMMERCIAL

## 2022-07-15 DIAGNOSIS — N20.0 KIDNEY STONE: ICD-10-CM

## 2022-07-15 DIAGNOSIS — N39.0 URINARY TRACT INFECTION, SITE NOT SPECIFIED: Primary | ICD-10-CM

## 2022-07-15 DIAGNOSIS — N28.89 RENAL MASS: ICD-10-CM

## 2022-07-15 DIAGNOSIS — Z87.448 H/O HYDRONEPHROSIS: ICD-10-CM

## 2022-07-15 DIAGNOSIS — R35.1 NOCTURIA: ICD-10-CM

## 2022-07-15 DIAGNOSIS — Z90.5 H/O RIGHT NEPHRECTOMY: ICD-10-CM

## 2022-07-15 DIAGNOSIS — N43.3 HYDROCELE: ICD-10-CM

## 2022-07-15 DIAGNOSIS — Z87.448 HISTORY OF HYDRONEPHROSIS: ICD-10-CM

## 2022-07-15 DIAGNOSIS — I10 HTN (HYPERTENSION): ICD-10-CM

## 2022-07-15 LAB — CREAT BLD-MCNC: 1.09 MG/DL

## 2022-07-15 PROCEDURE — 36415 COLL VENOUS BLD VENIPUNCTURE: CPT

## 2022-07-15 PROCEDURE — A9575 INJ GADOTERATE MEGLUMI 0.1ML: HCPCS | Performed by: UROLOGY

## 2022-07-15 PROCEDURE — 74183 MRI ABD W/O CNTR FLWD CNTR: CPT | Performed by: UROLOGY

## 2022-07-15 PROCEDURE — 82565 ASSAY OF CREATININE: CPT

## 2022-07-15 RX ORDER — PEN NEEDLE, DIABETIC 32GX 5/32"
NEEDLE, DISPOSABLE MISCELLANEOUS
Qty: 12 EACH | Refills: 11 | Status: SHIPPED | OUTPATIENT
Start: 2022-07-15 | End: 2022-07-20

## 2022-07-15 NOTE — TELEPHONE ENCOUNTER
Refill passed per [address] [place] [address] [facility] protocol.     Requested Prescriptions   Pending Prescriptions Disp Refills    BD PEN NEEDLE LINA U/F 32G X 4 MM Does not apply Misc [Pharmacy Med Name: BD PEN VANESSA UF LINA 4MM 90'S 32G5/32] 80 each 0     Sig: USE 1 PEN NEEDLE EVERY 7 DAYS        Diabetic Supplies Protocol Passed - 7/14/2022  9:41 PM        Passed - In person appointment or virtual visit in the past 12 mos or appointment in next 3 mos       Recent Outpatient Visits              2 months ago Chronic kidney disease (CKD) stage G2/A2, mildly decreased glomerular filtration rate (GFR) between 60-89 mL/min/1.73 square meter and albuminuria creatinine ratio between  mg/g    [address] [place] [address], [address], Hilda Her MD    Office Visit    6 months ago Need for shingles vaccine    [address] [facility] [place], [address], [address]    Nurse Only    7 months ago Type 2 diabetes mellitus without complication, without long-term current use of insulin ([facility] [address]    [address] [facility] [place], [address], Hilda Her MD    Office Visit    10 months ago Vitamin D deficiency    [address] [facility], Hilda Her MD    Office Visit    1 year ago Chronic kidney disease (CKD) stage G2/A2, mildly decreased glomerular filtration rate (GFR) between 60-89 mL/min/1.73 square meter and albuminuria creatinine ratio between  mg/g    [address], [address] [facility] [address], Hilda Her MD    Office Visit     Future Appointments         Provider Department Appt Notes    In 1 month Martha Cherry MD [facility] [facility] [place], [address] PER  [facility]                    Future Appointments         Provider Department Appt Notes    In 1 month Martha Cherry MD [address], [address] PER  [facility]           Recent Outpatient Visits              2 months ago Chronic kidney disease (CKD) stage G2/A2, mildly decreased glomerular filtration rate (GFR) between 60-89 mL/min/1.73 square meter and albuminuria creatinine ratio between  mg/g    [facility], [address], Cayetano Montes MD    Office Visit    6 months ago Need for shingles vaccine    [facility] [place], [address], [place]    Nurse Only    7 months ago Type 2 diabetes mellitus without complication, without long-term current use of insulin [facility])    [facility] [place], [address], Cayetano Montes MD    Office Visit    10 months ago Vitamin D deficiency    Cayetano Ryder MD    Office Visit    1 year ago Chronic kidney disease (CKD) stage G2/A2, mildly decreased glomerular filtration rate (GFR) between 60-89 mL/min/1.73 square meter and albuminuria creatinine ratio between  mg/g    [facility], [address], Cayetano Montes MD    Office Visit

## 2022-07-20 RX ORDER — PEN NEEDLE, DIABETIC 32GX 5/32"
NEEDLE, DISPOSABLE MISCELLANEOUS
Qty: 90 EACH | Refills: 11 | Status: SHIPPED | OUTPATIENT
Start: 2022-07-20 | End: 2023-07-29

## 2022-07-25 ENCOUNTER — TELEPHONE (OUTPATIENT)
Dept: INTERNAL MEDICINE CLINIC | Facility: CLINIC | Age: 52
End: 2022-07-25

## 2022-07-25 RX ORDER — INSULIN DETEMIR 100 [IU]/ML
25 INJECTION, SOLUTION SUBCUTANEOUS NIGHTLY
Qty: 45 ML | Refills: 5 | Status: SHIPPED | OUTPATIENT
Start: 2022-07-25 | End: 2022-07-29

## 2022-07-25 NOTE — TELEPHONE ENCOUNTER
Patient is requesting a 4 month follow up appointment with PCP only. .  Patient is scheduled for 8/16/22 yet requesting an appointment as soon as PCP has appointments available as she will need to cancel this appointment soon. Please advise.

## 2022-07-29 ENCOUNTER — OFFICE VISIT (OUTPATIENT)
Dept: INTERNAL MEDICINE CLINIC | Facility: CLINIC | Age: 52
End: 2022-07-29
Payer: COMMERCIAL

## 2022-07-29 VITALS
SYSTOLIC BLOOD PRESSURE: 118 MMHG | OXYGEN SATURATION: 97 % | BODY MASS INDEX: 41.78 KG/M2 | TEMPERATURE: 98 F | RESPIRATION RATE: 17 BRPM | WEIGHT: 260 LBS | HEIGHT: 66 IN | DIASTOLIC BLOOD PRESSURE: 70 MMHG | HEART RATE: 83 BPM

## 2022-07-29 DIAGNOSIS — R74.8 ELEVATED LIVER ENZYMES: Primary | ICD-10-CM

## 2022-07-29 DIAGNOSIS — K76.0 FATTY LIVER DISEASE, NONALCOHOLIC: ICD-10-CM

## 2022-07-29 DIAGNOSIS — Z71.85 VACCINE COUNSELING: ICD-10-CM

## 2022-07-29 DIAGNOSIS — E11.9 TYPE 2 DIABETES MELLITUS WITHOUT COMPLICATION, WITHOUT LONG-TERM CURRENT USE OF INSULIN (HCC): ICD-10-CM

## 2022-07-29 DIAGNOSIS — N18.2 CHRONIC KIDNEY DISEASE (CKD) STAGE G2/A2, MILDLY DECREASED GLOMERULAR FILTRATION RATE (GFR) BETWEEN 60-89 ML/MIN/1.73 SQUARE METER AND ALBUMINURIA CREATININE RATIO BETWEEN 30-299 MG/G: ICD-10-CM

## 2022-07-29 DIAGNOSIS — E28.2 PCOS (POLYCYSTIC OVARIAN SYNDROME): ICD-10-CM

## 2022-07-29 DIAGNOSIS — E03.9 ACQUIRED HYPOTHYROIDISM: ICD-10-CM

## 2022-07-29 DIAGNOSIS — E55.9 VITAMIN D DEFICIENCY: ICD-10-CM

## 2022-07-29 DIAGNOSIS — Z12.31 ENCOUNTER FOR SCREENING MAMMOGRAM FOR MALIGNANT NEOPLASM OF BREAST: ICD-10-CM

## 2022-07-29 DIAGNOSIS — I10 PRIMARY HYPERTENSION: ICD-10-CM

## 2022-07-29 PROCEDURE — 3074F SYST BP LT 130 MM HG: CPT | Performed by: INTERNAL MEDICINE

## 2022-07-29 PROCEDURE — 99214 OFFICE O/P EST MOD 30 MIN: CPT | Performed by: INTERNAL MEDICINE

## 2022-07-29 PROCEDURE — 3078F DIAST BP <80 MM HG: CPT | Performed by: INTERNAL MEDICINE

## 2022-07-29 PROCEDURE — 3008F BODY MASS INDEX DOCD: CPT | Performed by: INTERNAL MEDICINE

## 2022-07-29 RX ORDER — INSULIN DETEMIR 100 [IU]/ML
40 INJECTION, SOLUTION SUBCUTANEOUS NIGHTLY
Qty: 45 ML | Refills: 5 | Status: SHIPPED | OUTPATIENT
Start: 2022-07-29

## 2022-08-15 LAB
AMB EXT ANION GAP: 9
AMB EXT BILIRUBIN, TOTAL: 0.7 MG/DL
AMB EXT BUN: 16 MG/DL
AMB EXT CALCIUM: 9.5
AMB EXT CARBON DIOXIDE: 27
AMB EXT CHLORIDE: 101
AMB EXT CHOLESTEROL, TOTAL: 174 MG/DL
AMB EXT CMP ALT: 55 U/L
AMB EXT CMP AST: 57 U/L
AMB EXT CREATININE: 1.07 MG/DL
AMB EXT GLUCOSE: 197 MG/DL
AMB EXT HDL CHOLESTEROL: 40 MG/DL
AMB EXT HEMATOCRIT: 44
AMB EXT HEMOGLOBIN: 15.4
AMB EXT HGBA1C: 7.8 %
AMB EXT LDL CHOLESTEROL, DIRECT: 103 MG/DL
AMB EXT MALB URINE CALC: 3 MG/24HR
AMB EXT MALB/CRE CALC: 3 UG/MG
AMB EXT MCV: 90.3
AMB EXT PLATELETS: 282
AMB EXT POSTASSIUM: 5 MMOL/L
AMB EXT SODIUM: 137 MMOL/L
AMB EXT TOTAL PROTEIN: 7.4
AMB EXT TRIGLYCERIDES: 156 MG/DL
AMB EXT WBC: 8.3 X10(3)UL

## 2022-08-15 PROCEDURE — 3051F HG A1C>EQUAL 7.0%<8.0%: CPT | Performed by: INTERNAL MEDICINE

## 2022-08-15 PROCEDURE — 3061F NEG MICROALBUMINURIA REV: CPT | Performed by: INTERNAL MEDICINE

## 2022-08-19 ENCOUNTER — PATIENT MESSAGE (OUTPATIENT)
Dept: INTERNAL MEDICINE CLINIC | Facility: CLINIC | Age: 52
End: 2022-08-19

## 2022-08-19 NOTE — TELEPHONE ENCOUNTER
From: Akanksha Mejias  To: Andrew Sandy. Nilsa Ingram MD  Sent: 8/19/2022 7:37 AM CDT  Subject: Leidy Ingram-  On Monday I had my annual executive physical at [address] [place] [address]. When i saw you last you requested to receive a copy of the report. Please see the attached. It includes result of many blood tests, hearing, diabetes eye exam, and stress echo.    Deidra Sanford

## 2022-08-29 RX ORDER — SPIRONOLACTONE 100 MG/1
TABLET, FILM COATED ORAL
Qty: 135 TABLET | Refills: 1 | Status: SHIPPED | OUTPATIENT
Start: 2022-08-29 | End: 2023-02-27

## 2022-08-29 NOTE — TELEPHONE ENCOUNTER
Refill passed per [facility] protocol, however, please review pended refill request as unable to refill due to high/very high interaction warning copied here:    Drug-Drug: Candesartan Cilexetil and spironolactone  The risk of hyperkalemia may be increased when potassium-sparing diuretics are co-administered with angiotensin II receptor antagonists. spironolactone 100 MG Oral Tab [Pharmacy Med Name: SPIRONOLACTONE TABS 100MG]  Prescription. Reordered. Long-term. Candesartan Cilexetil 8 MG Oral Tab  Prescription. Active. Long-term.       .  Requested Prescriptions   Pending Prescriptions Disp Refills    SPIRONOLACTONE 100 MG Oral Tab [Pharmacy Med Name: SPIRONOLACTONE TABS 100MG] 135 tablet 3     Sig: TAKE 1 TABLET EVERY MORNING AND ONE-HALF (1/2) TABLET IN THE EVENING        Hypertensive Medications Protocol Passed - 8/28/2022  8:36 PM        Passed - In person appointment in the past 12 or next 3 months       Recent Outpatient Visits              1 month ago Elevated liver enzymes    [place] [facility] [facility], Michelle Reyes MD    Office Visit    4 months ago Chronic kidney disease (CKD) stage G2/A2, mildly decreased glomerular filtration rate (GFR) between 60-89 mL/min/1.73 square meter and albuminuria creatinine ratio between  mg/g    [place] [employer] [facility] [facility], [address], Royer Armijo MD    Office Visit    8 months ago Need for shingles vaccine    [facility], [address], [address]    Nurse Only    9 months ago Type 2 diabetes mellitus without complication, without long-term current use of insulin [facility])    [place] [employer] [facility], [address], Royer Armijo MD    Office Visit    12 months ago Vitamin D deficiency    [facility], Royer Armijo MD    Office Visit     Future Appointments         Provider Department Appt Notes    In 2 months Roland Ge MD [place] [employer] [facility], [address], [place]                Passed - Last BP reading less than 140/90     BP Readings from Last 1 Encounters:  07/29/22 : 118/70                Passed - CMP or BMP in past 6 months     Recent Results (from the past 4392 hour(s))   COMP METABOLIC PANEL (14)    Collection Time: 08/15/22 12:00 AM   Result Value Ref Range    Glucose 197 mg/dL    BUN 16 mg/dL    Bilirubin, Total 0.7 mg/dL    SODIUM 137 mmol/L    POTASSIUM 5 mmol/L    AST 57 U/L    ALT 55 U/L    CHLORIDE 101     CO2 27     CALCIUM 9.5     ANION GAP 9     TP 7.4      *Note: Due to a large number of results and/or encounters for the requested time period, some results have not been displayed. A complete set of results can be found in Results Review.                  Passed - In person appointment or virtual visit in the past 6 months       Recent Outpatient Visits              1 month ago Elevated liver enzymes    [place] [facility], Rivka Rainey MD    Office Visit    4 months ago Chronic kidney disease (CKD) stage G2/A2, mildly decreased glomerular filtration rate (GFR) between 60-89 mL/min/1.73 square meter and albuminuria creatinine ratio between  mg/g    [place] [employer] [facility], [address], Dony Paula MD    Office Visit    8 months ago Need for shingles vaccine    [facility], [address], [place]    Nurse Only    9 months ago Type 2 diabetes mellitus without complication, without long-term current use of insulin [facility])    [facility], [address]Dony MD    Office Visit    12 months ago Vitamin D deficiency    [facility], [address]Dony MD    Office Visit     Future Appointments         Provider Department Appt Notes    In 2 months Dustin Titus MD [place] [employer] [facility], [address], [place]                Passed - GFR > 50     No results found for: [facility]                    Recent Outpatient Visits              1 month ago Elevated liver enzymes    [place] [facility], [facility] Denton Epperson MD    Office Visit    4 months ago Chronic kidney disease (CKD) stage G2/A2, mildly decreased glomerular filtration rate (GFR) between 60-89 mL/min/1.73 square meter and albuminuria creatinine ratio between  mg/g    [address], [address], Cayetano Montes MD    Office Visit    8 months ago Need for shingles vaccine    [address], [address], [facility]    Nurse Only    9 months ago Type 2 diabetes mellitus without complication, without long-term current use of insulin [facility])    [address], [address], Cayetano Montes MD    Office Visit    12 months ago Vitamin D deficiency    [address] [place], [address], Cayetano Montes MD    Office Visit            Future Appointments         Provider Department Appt Notes    In 2 months Denton Epperson MD [address], [facility]

## 2022-09-12 RX ORDER — SEMAGLUTIDE 1.34 MG/ML
1 INJECTION, SOLUTION SUBCUTANEOUS
Qty: 6 ML | Refills: 5 | Status: SHIPPED | OUTPATIENT
Start: 2022-09-12 | End: 2023-06-06

## 2022-09-12 NOTE — TELEPHONE ENCOUNTER
Protocol failed or has No Protocol, please review  Requested Prescriptions   Pending Prescriptions Disp Refills    OZEMPIC, 1 MG/DOSE, 4 MG/3ML Subcutaneous Solution Pen-injector [Pharmacy Med Name: Abril Presley PEN (1MG/DOSE) 3ML 4MG/3ML] 6 mL 5     Sig: INJECT 1 MG UNDER THE SKIN EVERY 7 DAYS        Diabetes Medication Protocol Failed - 9/11/2022  7:37 PM        Failed - Last A1C < 7.5 and within past 6 months     Lab Results   Component Value Date    A1C 7.8 08/15/2022               Failed - GFR in the past 12 months        Passed - In person appointment or virtual visit in the past 6 mos or appointment in next 3 mos       Recent Outpatient Visits              1 month ago Elevated liver [place]    Office Visit    4 months ago Chronic kidney disease (CKD) stage G2/A2, mildly decreased glomerular filtration rate (GFR) between 60-89 mL/min/1.73 square meter and albuminuria creatinine ratio between  mg/g    [address] [place] [address], Lakesha Lai MD    Office Visit    8 months ago Need for shingles vaccine    [place] [facility] [facility], [address], [employer]    Nurse Only    9 months ago Type 2 diabetes mellitus without complication, without long-term current use of insulin [facility])    [place] [facility], [address]Lakesha MD    Office Visit    1 year ago Vitamin D deficiency    [facility], [address], Lakesha Lai MD    Office Visit     Future Appointments         Provider Department Appt Notes    In 1 month Vnai Alvarez MD [place] [employer] [facility], [address], [place]                Passed - GFR > 50     No results found for: [facility]                  Future Appointments         Provider Department Appt Notes    In 1 month Vani Alvarez MD [place] [employer] [facility], [address], [employer]           Recent Outpatient Visits              1 month ago Elevated liver enzymes    [facility], [facility], Gayle Griggs MD    Office Visit    4 months ago Chronic kidney disease (CKD) stage G2/A2, mildly decreased glomerular filtration rate (GFR) between 60-89 mL/min/1.73 square meter and albuminuria creatinine ratio between  mg/g    [facility], [address], Robert Waddell MD    Office Visit    8 months ago Need for shingles vaccine    [facility], [address], [address]    Nurse Only    9 months ago Type 2 diabetes mellitus without complication, without long-term current use of insulin [facility])    [facility], [address], Robert Waddell MD    Office Visit    1 year ago Vitamin D deficiency    [facility], Robert Waddell MD    Office Visit

## 2022-09-13 ENCOUNTER — PATIENT MESSAGE (OUTPATIENT)
Dept: INTERNAL MEDICINE CLINIC | Facility: CLINIC | Age: 52
End: 2022-09-13

## 2022-09-13 RX ORDER — INSULIN DETEMIR 100 [IU]/ML
40 INJECTION, SOLUTION SUBCUTANEOUS NIGHTLY
Qty: 45 ML | Refills: 0 | Status: SHIPPED | OUTPATIENT
Start: 2022-09-13

## 2022-09-13 NOTE — TELEPHONE ENCOUNTER
From: Naz Ayaal  To: Nisha Dowd. Kobe Bailey MD  Sent: 9/13/2022 9:17 AM CDT  Subject: Temporary prescription to local pharmacy    Dr Kobe Bailey-  Would like to ask if you would please send a prescription to my [facility] for Levemir? I forgot to place an order with my mail order pharmacy and am completely out (thought it was on auto-reorder but figured out its not). I have placed the mail order request but it says i wont receive for another 8-10 days. Would you send a prescription to [place] for an 8-10 day supply (I currently take 40 clicks/units per day)? [facility] is located at [address] [place] [address]. Thank you.   Rolando Chaparro

## 2022-09-19 NOTE — TELEPHONE ENCOUNTER
[facility] Tradjenta 5 mg twice a day dosing sent out 3/10/18.    Amlodipine 5 mg twice a day dosing sent out 3/13/18 5

## 2022-09-22 ENCOUNTER — MED REC SCAN ONLY (OUTPATIENT)
Dept: INTERNAL MEDICINE CLINIC | Facility: CLINIC | Age: 52
End: 2022-09-22

## 2022-09-22 PROBLEM — K86.2 PANCREATIC CYST (HCC): Status: ACTIVE | Noted: 2022-09-22

## 2022-09-22 PROBLEM — K86.2 PANCREATIC CYST: Status: ACTIVE | Noted: 2022-09-22

## 2022-09-26 ENCOUNTER — APPOINTMENT (OUTPATIENT)
Dept: URBAN - METROPOLITAN AREA CLINIC 244 | Age: 52
Setting detail: DERMATOLOGY
End: 2022-09-27

## 2022-09-26 DIAGNOSIS — D22 MELANOCYTIC NEVI: ICD-10-CM

## 2022-09-26 DIAGNOSIS — L81.4 OTHER MELANIN HYPERPIGMENTATION: ICD-10-CM

## 2022-09-26 DIAGNOSIS — L82.1 OTHER SEBORRHEIC KERATOSIS: ICD-10-CM

## 2022-09-26 DIAGNOSIS — L28.1 PRURIGO NODULARIS: ICD-10-CM

## 2022-09-26 PROBLEM — D22.5 MELANOCYTIC NEVI OF TRUNK: Status: ACTIVE | Noted: 2022-09-26

## 2022-09-26 PROCEDURE — OTHER PRESCRIPTION: OTHER

## 2022-09-26 PROCEDURE — OTHER MEDICATION COUNSELING: OTHER

## 2022-09-26 PROCEDURE — OTHER COUNSELING: OTHER

## 2022-09-26 PROCEDURE — 99214 OFFICE O/P EST MOD 30 MIN: CPT

## 2022-09-26 PROCEDURE — OTHER PRESCRIPTION MEDICATION MANAGEMENT: OTHER

## 2022-09-26 RX ORDER — FLUOCINONIDE 0.5 MG/G
OINTMENT TOPICAL
Qty: 30 | Refills: 0 | Status: ERX | COMMUNITY
Start: 2022-09-26

## 2022-09-26 ASSESSMENT — LOCATION SIMPLE DESCRIPTION DERM
LOCATION SIMPLE: RIGHT WRIST
LOCATION SIMPLE: CHEST
LOCATION SIMPLE: LEFT UPPER BACK
LOCATION SIMPLE: LEFT WRIST
LOCATION SIMPLE: RIGHT UPPER BACK

## 2022-09-26 ASSESSMENT — LOCATION ZONE DERM
LOCATION ZONE: ARM
LOCATION ZONE: TRUNK

## 2022-09-26 ASSESSMENT — LOCATION DETAILED DESCRIPTION DERM
LOCATION DETAILED: RIGHT DORSAL WRIST
LOCATION DETAILED: LEFT DORSAL WRIST
LOCATION DETAILED: LEFT MEDIAL UPPER BACK
LOCATION DETAILED: UPPER STERNUM
LOCATION DETAILED: RIGHT SUPERIOR MEDIAL UPPER BACK

## 2022-09-26 NOTE — PROCEDURE: MEDICATION COUNSELING
Consult to quit smoking, explained risks and benefits, we will order a nicotine patch  Minoxidil Counseling: Minoxidil is a topical medication which can increase blood flow where it is applied. It is uncertain how this medication increases hair growth. Side effects are uncommon and include stinging and allergic reactions.

## 2022-09-26 NOTE — PROCEDURE: MEDICATION COUNSELING
[facility] Odomzo Counseling- I discussed with the patient the risks of Odomzo including but not limited to nausea, vomiting, diarrhea, constipation, weight loss, changes in the sense of taste, decreased appetite, muscle spasms, and hair loss.  The patient verbalized understanding of the proper use and possible adverse effects of Odomzo.  All of the patient's questions and concerns were addressed.

## 2022-09-26 NOTE — PROCEDURE: PRESCRIPTION MEDICATION MANAGEMENT
Render In Strict Bullet Format?: No
Detail Level: Simple
Initiate Treatment: Fluocinonide, Blend Derm tape
Plan: Apply Fluocinonide QAM then put blend derm tape over two week max per flare

## 2022-09-26 NOTE — PROCEDURE: MEDICATION COUNSELING
Xelmeloniez Pregnancy And Lactation Text: This medication is Pregnancy Category D and is not considered safe during pregnancy.  The risk during breast feeding is also uncertain.

## 2022-09-26 NOTE — PROCEDURE: MEDICATION COUNSELING
Waiting for transportation to arrive. Cosentyx Counseling:  I discussed with the patient the risks of Cosentyx including but not limited to worsening of Crohn's disease, immunosuppression, allergic reactions and infections.  The patient understands that monitoring is required including a PPD at baseline and must alert us or the primary physician if symptoms of infection or other concerning signs are noted.

## 2022-10-10 RX ORDER — LEVOTHYROXINE SODIUM 0.1 MG/1
TABLET ORAL
Qty: 90 TABLET | Refills: 1 | Status: SHIPPED | OUTPATIENT
Start: 2022-10-10

## 2022-10-10 NOTE — TELEPHONE ENCOUNTER
Refill passed per [facility] protocol.    Requested Prescriptions   Pending Prescriptions Disp Refills    LEVOTHYROXINE 100 MCG Oral Tab [Pharmacy Med Name: L-THYROXINE (SYNTHROID) TABS 100MCG] 90 tablet 3     Sig: TAKE 1 TABLET BEFORE BREAKFAST        Thyroid Medication Protocol Passed - 10/9/2022 11:40 PM        Passed - TSH in past 12 months        Passed - Last TSH value is normal     Lab Results   Component Value Date    TSH 0.893 04/26/2022    [facility] 1.59 06/22/2016                 Passed - In person appointment or virtual visit in the past 12 mos or appointment in next 3 mos       Recent Outpatient Visits              2 months ago Elevated liver Dameon Boo Cornel Cutting., MD    Office Visit    5 months ago Chronic kidney disease (CKD) stage G2/A2, mildly decreased glomerular filtration rate (GFR) between 60-89 mL/min/1.73 square meter and albuminuria creatinine ratio between  mg/g    [place] [employer] [facility], [address], Luis Olivarez., MD    Office Visit    9 months ago Need for shingles vaccine    [place] [employer] [facility], [address], [place]    Nurse Only    10 months ago Type 2 diabetes mellitus without complication, without long-term current use of insulin [facility])    [place] [employer] [email or web address] [facility], [address], Luis Arana MD    Office Visit    1 year ago Vitamin D deficiency    Luis White MD    Office Visit     Future Appointments         Provider Department Appt Notes    In 3 weeks Shaye Gilliam MD [facility], [facility]                     Recent Outpatient Visits              2 months ago Elevated liver enzymes    [facility], Evreardo Xiao MD    Office Visit    5 months ago Chronic kidney disease (CKD) stage G2/A2, mildly decreased glomerular filtration rate (GFR) between 60-89 mL/min/1.73 square meter and albuminuria creatinine ratio between  mg/g    [address], [address], Sonia Martin MD    Office Visit    9 months ago Need for shingles vaccine    [address], [address], [place]    Nurse Only    10 months ago Type 2 diabetes mellitus without complication, without long-term current use of insulin [facility])    [address], [address], Sonia Martin MD    Office Visit    1 year ago Vitamin D deficiency    [address], [address], Sonia Martin MD    Office Visit           Future Appointments         Provider Department Appt Notes    In 3 weeks Honey Yao MD [address], [address] [facility]

## 2022-11-01 ENCOUNTER — OFFICE VISIT (OUTPATIENT)
Dept: INTERNAL MEDICINE CLINIC | Facility: CLINIC | Age: 52
End: 2022-11-01
Payer: COMMERCIAL

## 2022-11-01 VITALS
WEIGHT: 258.38 LBS | HEART RATE: 68 BPM | HEIGHT: 66 IN | BODY MASS INDEX: 41.52 KG/M2 | DIASTOLIC BLOOD PRESSURE: 78 MMHG | TEMPERATURE: 98 F | OXYGEN SATURATION: 97 % | SYSTOLIC BLOOD PRESSURE: 120 MMHG | RESPIRATION RATE: 16 BRPM

## 2022-11-01 DIAGNOSIS — N18.2 CHRONIC KIDNEY DISEASE (CKD) STAGE G2/A2, MILDLY DECREASED GLOMERULAR FILTRATION RATE (GFR) BETWEEN 60-89 ML/MIN/1.73 SQUARE METER AND ALBUMINURIA CREATININE RATIO BETWEEN 30-299 MG/G: ICD-10-CM

## 2022-11-01 DIAGNOSIS — M79.641 PAIN IN BOTH HANDS: ICD-10-CM

## 2022-11-01 DIAGNOSIS — I10 PRIMARY HYPERTENSION: ICD-10-CM

## 2022-11-01 DIAGNOSIS — E11.9 TYPE 2 DIABETES MELLITUS WITHOUT COMPLICATION, WITHOUT LONG-TERM CURRENT USE OF INSULIN (HCC): Primary | ICD-10-CM

## 2022-11-01 DIAGNOSIS — Z71.85 VACCINE COUNSELING: ICD-10-CM

## 2022-11-01 DIAGNOSIS — M79.642 PAIN IN BOTH HANDS: ICD-10-CM

## 2022-11-01 DIAGNOSIS — E55.9 VITAMIN D DEFICIENCY: ICD-10-CM

## 2022-11-01 DIAGNOSIS — E03.9 ACQUIRED HYPOTHYROIDISM: ICD-10-CM

## 2022-11-01 PROBLEM — H04.123 DRY EYES: Status: ACTIVE | Noted: 2022-08-15

## 2022-11-01 PROCEDURE — 3008F BODY MASS INDEX DOCD: CPT | Performed by: INTERNAL MEDICINE

## 2022-11-01 PROCEDURE — 3074F SYST BP LT 130 MM HG: CPT | Performed by: INTERNAL MEDICINE

## 2022-11-01 PROCEDURE — 99214 OFFICE O/P EST MOD 30 MIN: CPT | Performed by: INTERNAL MEDICINE

## 2022-11-01 PROCEDURE — 3078F DIAST BP <80 MM HG: CPT | Performed by: INTERNAL MEDICINE

## 2022-11-01 RX ORDER — FLUOCINONIDE 0.5 MG/G
OINTMENT TOPICAL
COMMUNITY
Start: 2022-09-26

## 2022-11-01 RX ORDER — INSULIN DETEMIR 100 [IU]/ML
INJECTION, SOLUTION SUBCUTANEOUS
Qty: 45 ML | Refills: 0 | Status: SHIPPED | OUTPATIENT
Start: 2022-11-01

## 2023-01-01 NOTE — PROCEDURES
Preoperative Diagnosis:  (M48.04) Thoracic spinal stenosis  (primary encounter diagnosis)       Postoperative Diagnosis:  (M48.04) Thoracic spinal stenosis  (primary encounter diagnosis)       Procedures:   T10-11 interlaminar epidural steroid injection un
negative

## 2023-01-01 NOTE — TELEPHONE ENCOUNTER
From: Pratibha Knox  To: Robert Madrigal., MD  Sent: 2/14/2017 9:26 AM CST  Subject: Test Results Question    Good Morning Dr Liv Robb-    I received your update on yesterday's test results, but I'm not sure I understand your comment.  You wrote:    [place] 1. No limitations

## 2023-04-24 ENCOUNTER — PATIENT MESSAGE (OUTPATIENT)
Dept: INTERNAL MEDICINE CLINIC | Facility: CLINIC | Age: 53
End: 2023-04-24

## 2023-04-24 RX ORDER — AMLODIPINE BESYLATE 2.5 MG/1
2.5 TABLET ORAL 2 TIMES DAILY
Qty: 180 TABLET | Refills: 1 | Status: SHIPPED | OUTPATIENT
Start: 2023-04-24

## 2023-04-25 ENCOUNTER — OFFICE VISIT (OUTPATIENT)
Dept: INTERNAL MEDICINE CLINIC | Facility: CLINIC | Age: 53
End: 2023-04-25

## 2023-04-25 ENCOUNTER — TELEPHONE (OUTPATIENT)
Dept: INTERNAL MEDICINE CLINIC | Facility: CLINIC | Age: 53
End: 2023-04-25

## 2023-04-25 VITALS
HEART RATE: 70 BPM | DIASTOLIC BLOOD PRESSURE: 72 MMHG | OXYGEN SATURATION: 96 % | SYSTOLIC BLOOD PRESSURE: 113 MMHG | WEIGHT: 263 LBS | BODY MASS INDEX: 42.27 KG/M2 | TEMPERATURE: 99 F | RESPIRATION RATE: 14 BRPM | HEIGHT: 66 IN

## 2023-04-25 DIAGNOSIS — E55.9 VITAMIN D DEFICIENCY: ICD-10-CM

## 2023-04-25 DIAGNOSIS — I10 PRIMARY HYPERTENSION: ICD-10-CM

## 2023-04-25 DIAGNOSIS — K59.00 CONSTIPATION, UNSPECIFIED CONSTIPATION TYPE: ICD-10-CM

## 2023-04-25 DIAGNOSIS — E04.2 MULTINODULAR GOITER: ICD-10-CM

## 2023-04-25 DIAGNOSIS — H04.123 DRY EYES: ICD-10-CM

## 2023-04-25 DIAGNOSIS — Z12.31 ENCOUNTER FOR SCREENING MAMMOGRAM FOR MALIGNANT NEOPLASM OF BREAST: ICD-10-CM

## 2023-04-25 DIAGNOSIS — Z71.85 VACCINE COUNSELING: ICD-10-CM

## 2023-04-25 DIAGNOSIS — M48.04 THORACIC SPINAL STENOSIS: ICD-10-CM

## 2023-04-25 DIAGNOSIS — G57.61 MORTON NEUROMA, RIGHT: ICD-10-CM

## 2023-04-25 DIAGNOSIS — E11.9 TYPE 2 DIABETES MELLITUS WITHOUT COMPLICATION, WITHOUT LONG-TERM CURRENT USE OF INSULIN (HCC): Primary | ICD-10-CM

## 2023-04-25 DIAGNOSIS — Z12.11 COLON CANCER SCREENING: ICD-10-CM

## 2023-04-25 DIAGNOSIS — Z90.5 STATUS POST NEPHRECTOMY: ICD-10-CM

## 2023-04-25 DIAGNOSIS — K86.2 PANCREATIC CYST: ICD-10-CM

## 2023-04-25 DIAGNOSIS — K76.0 FATTY LIVER DISEASE, NONALCOHOLIC: ICD-10-CM

## 2023-04-25 DIAGNOSIS — M25.552 PAIN OF LEFT HIP JOINT: ICD-10-CM

## 2023-04-25 DIAGNOSIS — N13.39 OTHER HYDRONEPHROSIS: ICD-10-CM

## 2023-04-25 DIAGNOSIS — E28.2 PCOS (POLYCYSTIC OVARIAN SYNDROME): ICD-10-CM

## 2023-04-25 DIAGNOSIS — C64.1 RENAL CELL CARCINOMA OF RIGHT KIDNEY (HCC): ICD-10-CM

## 2023-04-25 DIAGNOSIS — R74.8 ELEVATED LIVER ENZYMES: ICD-10-CM

## 2023-04-25 DIAGNOSIS — E03.9 ACQUIRED HYPOTHYROIDISM: ICD-10-CM

## 2023-04-25 DIAGNOSIS — Z00.00 ADULT GENERAL MEDICAL EXAM: ICD-10-CM

## 2023-04-25 DIAGNOSIS — N18.2 CHRONIC KIDNEY DISEASE (CKD) STAGE G2/A2, MILDLY DECREASED GLOMERULAR FILTRATION RATE (GFR) BETWEEN 60-89 ML/MIN/1.73 SQUARE METER AND ALBUMINURIA CREATININE RATIO BETWEEN 30-299 MG/G: ICD-10-CM

## 2023-04-25 PROCEDURE — 90471 IMMUNIZATION ADMIN: CPT | Performed by: INTERNAL MEDICINE

## 2023-04-25 PROCEDURE — 99396 PREV VISIT EST AGE 40-64: CPT | Performed by: INTERNAL MEDICINE

## 2023-04-25 PROCEDURE — 3078F DIAST BP <80 MM HG: CPT | Performed by: INTERNAL MEDICINE

## 2023-04-25 PROCEDURE — 3074F SYST BP LT 130 MM HG: CPT | Performed by: INTERNAL MEDICINE

## 2023-04-25 PROCEDURE — 90715 TDAP VACCINE 7 YRS/> IM: CPT | Performed by: INTERNAL MEDICINE

## 2023-04-25 PROCEDURE — 3008F BODY MASS INDEX DOCD: CPT | Performed by: INTERNAL MEDICINE

## 2023-04-25 RX ORDER — INSULIN DETEMIR 100 [IU]/ML
INJECTION, SOLUTION SUBCUTANEOUS
Qty: 45 ML | Refills: 0 | Status: SHIPPED | OUTPATIENT
Start: 2023-04-25

## 2023-04-25 RX ORDER — AMLODIPINE BESYLATE 2.5 MG/1
2.5 TABLET ORAL 2 TIMES DAILY
Qty: 180 TABLET | Refills: 1 | Status: SHIPPED | OUTPATIENT
Start: 2023-04-25

## 2023-04-25 RX ORDER — ZOLPIDEM TARTRATE 5 MG/1
5 TABLET ORAL NIGHTLY PRN
Qty: 15 TABLET | Refills: 0 | Status: SHIPPED | OUTPATIENT
Start: 2023-04-25

## 2023-04-25 NOTE — TELEPHONE ENCOUNTER
From: Nicole Price  To: Alec Payan. Jazmin Mccabe MD  Sent: 4/24/2023 1:07 PM CDT  Subject: Refill sent to wrong pharmacy    [facility]-   Your office responded to a request for refill of Amlodipine 2.5 that came from my mail order pharmacy however the refill was sent to the local [place] [address] pharmacy. Requesting that you resend the refill for Amlodipine 2.5 to my mail order pharmacy - [facility].   Thank you  Rekha

## 2023-04-25 NOTE — TELEPHONE ENCOUNTER
I left a message to patient to let her know that she is schedule today for 3 pm for a Physical but she is not due for her Physical until 8/15/2023. We will like to know if she wants to see for something acute or as a follow up. To please let us know.

## 2023-04-26 ENCOUNTER — LAB ENCOUNTER (OUTPATIENT)
Dept: LAB | Facility: HOSPITAL | Age: 53
End: 2023-04-26
Attending: INTERNAL MEDICINE
Payer: COMMERCIAL

## 2023-04-26 DIAGNOSIS — E11.9 TYPE 2 DIABETES MELLITUS WITHOUT COMPLICATION, WITHOUT LONG-TERM CURRENT USE OF INSULIN (HCC): ICD-10-CM

## 2023-04-26 LAB
ALBUMIN SERPL-MCNC: 3.3 G/DL (ref 3.4–5)
ALBUMIN/GLOB SERPL: 0.9 {RATIO} (ref 1–2)
ALP LIVER SERPL-CCNC: 90 U/L
ALT SERPL-CCNC: 80 U/L
ANION GAP SERPL CALC-SCNC: 10 MMOL/L (ref 0–18)
AST SERPL-CCNC: 49 U/L (ref 15–37)
BILIRUB SERPL-MCNC: 0.5 MG/DL (ref 0.1–2)
BUN BLD-MCNC: 21 MG/DL (ref 7–18)
BUN/CREAT SERPL: 21.9 (ref 10–20)
CALCIUM BLD-MCNC: 9.2 MG/DL (ref 8.5–10.1)
CHLORIDE SERPL-SCNC: 106 MMOL/L (ref 98–112)
CHOLEST SERPL-MCNC: 140 MG/DL (ref ?–200)
CO2 SERPL-SCNC: 24 MMOL/L (ref 21–32)
CREAT BLD-MCNC: 0.96 MG/DL
EST. AVERAGE GLUCOSE BLD GHB EST-MCNC: 186 MG/DL (ref 68–126)
FASTING PATIENT LIPID ANSWER: YES
FASTING STATUS PATIENT QL REPORTED: YES
GFR SERPLBLD BASED ON 1.73 SQ M-ARVRAT: 70 ML/MIN/1.73M2 (ref 60–?)
GLOBULIN PLAS-MCNC: 3.7 G/DL (ref 2.8–4.4)
GLUCOSE BLD-MCNC: 179 MG/DL (ref 70–99)
HBA1C MFR BLD: 8.1 % (ref ?–5.7)
HDLC SERPL-MCNC: 49 MG/DL (ref 40–59)
LDLC SERPL CALC-MCNC: 70 MG/DL (ref ?–100)
NONHDLC SERPL-MCNC: 91 MG/DL (ref ?–130)
OSMOLALITY SERPL CALC.SUM OF ELEC: 297 MOSM/KG (ref 275–295)
POTASSIUM SERPL-SCNC: 4 MMOL/L (ref 3.5–5.1)
PROT SERPL-MCNC: 7 G/DL (ref 6.4–8.2)
SODIUM SERPL-SCNC: 140 MMOL/L (ref 136–145)
TRIGL SERPL-MCNC: 114 MG/DL (ref 30–149)
VLDLC SERPL CALC-MCNC: 17 MG/DL (ref 0–30)

## 2023-04-26 PROCEDURE — 3052F HG A1C>EQUAL 8.0%<EQUAL 9.0%: CPT | Performed by: INTERNAL MEDICINE

## 2023-04-26 PROCEDURE — 80053 COMPREHEN METABOLIC PANEL: CPT

## 2023-04-26 PROCEDURE — 83036 HEMOGLOBIN GLYCOSYLATED A1C: CPT

## 2023-04-26 PROCEDURE — 80061 LIPID PANEL: CPT

## 2023-04-26 PROCEDURE — 82728 ASSAY OF FERRITIN: CPT | Performed by: INTERNAL MEDICINE

## 2023-04-26 PROCEDURE — 36415 COLL VENOUS BLD VENIPUNCTURE: CPT

## 2023-04-27 NOTE — PROGRESS NOTES
CMP Normal (electrolytes, kidney and liver functions), but liver enzymes are elevated. May be from the sugars being elevated with fatty liver. Lipid (choilesterol) is good,   Hemoglobin A1c which is a 3-month average of sugars is worse than prior. Goal is 6.5 or less. Careful with diet and excercise at least 30 minutes 3-4 times a week. Check sugars at different times on different dates. Careful with low sugars. Carry something with you and check sugar if can. Can carry aristides cracker, etc. Decrease carbohydrates. But also, careful with fruits and natural sugars. One serving a day and no more than 1 handful every day. Check feet  every AM and careful with sores and ulcers on feet bilaterally. Check eyes every year with dilated eye exam.  Check sugars. 2-hour postmeal should be less than 140s. Pre-meal should be 's. Both equally affected A1c. Recheck A1c in 3 months. Orders written for future labs in 3 months.

## 2023-05-01 ENCOUNTER — PATIENT MESSAGE (OUTPATIENT)
Dept: INTERNAL MEDICINE CLINIC | Facility: CLINIC | Age: 53
End: 2023-05-01

## 2023-05-02 RX ORDER — INSULIN DETEMIR 100 [IU]/ML
INJECTION, SOLUTION SUBCUTANEOUS
Qty: 45 ML | Refills: 0 | Status: SHIPPED | OUTPATIENT
Start: 2023-05-02

## 2023-05-02 RX ORDER — FLASH GLUCOSE SENSOR
KIT MISCELLANEOUS
Qty: 7 EACH | Refills: 3 | Status: SHIPPED | OUTPATIENT
Start: 2023-05-02

## 2023-05-02 NOTE — TELEPHONE ENCOUNTER
From: Quintella Baumgarten  To: Penelope Guadarrama. Rashel Corona MD  Sent: 5/1/2023 8:36 AM CDT  Subject: Change in Levimir dosage & sugars    Hello Dr Rashel Corona-  Sending you a note as requested following my visit last Tues 4/25 and the change to my Levimir dosage. We upped the dosage from 15 units in am/30 units in pm to 25 am/ 35 pm. So far the result has been that morning/pre meal readings are down into the 125-135 range which is better but 2 hour post meal readings are still up around 180-200 (or sometimes more). I have not had any low sugar issues with the higher doses.   Stacy Franz

## 2023-05-03 NOTE — TELEPHONE ENCOUNTER
Refill passed per [facility] protocol   Requested Prescriptions   Pending Prescriptions Disp Refills    FREESTYLE MARCELLE 15 DAY SENSOR Does not apply Misc [Pharmacy Med Name: FREESTYLE MARCELLE SENSOR KIT 14 DAY] 7 each 3     Sig: USE AS DIRECTED       Diabetic Supplies Protocol Passed - 5/1/2023  9:46 PM        Passed - In person appointment or virtual visit in the past 12 mos or appointment in next 3 mos     Recent Outpatient Visits              1 week ago Type 2 diabetes mellitus without complication, without long-term current use of insulin ([address]    [facility], [address], Sheldon Isidro MD    Office Visit    6 months ago Type 2 diabetes mellitus without complication, without long-term current use of insulin ([address]    [facility], [address], Sheldon Isidro MD    Office Visit    9 months ago Elevated liver enzymes    Sruthi Jones MD    Office Visit    1 year ago Chronic kidney disease (CKD) stage G2/A2, mildly decreased glomerular filtration rate (GFR) between 60-89 mL/min/1.73 square meter and albuminuria creatinine ratio between  mg/g    [facility], [address], Sheldon Isidro MD    Office Visit    1 year ago Need for shingles vaccine    [facility], [address], [place]    Nurse Only          Future Appointments         Provider Department Appt Notes    In 1 month [address] [facility] Check of thyroid nodules    In 3 months David West MD [facility], [address], [place] 3 mth

## 2023-05-12 ENCOUNTER — PATIENT MESSAGE (OUTPATIENT)
Dept: INTERNAL MEDICINE CLINIC | Facility: CLINIC | Age: 53
End: 2023-05-12

## 2023-06-01 ENCOUNTER — HOSPITAL ENCOUNTER (OUTPATIENT)
Dept: ULTRASOUND IMAGING | Facility: HOSPITAL | Age: 53
Discharge: HOME | End: 2023-06-01
Attending: INTERNAL MEDICINE
Payer: COMMERCIAL

## 2023-06-01 DIAGNOSIS — E03.9 ACQUIRED HYPOTHYROIDISM: ICD-10-CM

## 2023-06-01 PROCEDURE — 76536 US EXAM OF HEAD AND NECK: CPT | Performed by: INTERNAL MEDICINE

## 2023-06-03 ENCOUNTER — TELEPHONE (OUTPATIENT)
Dept: INTERNAL MEDICINE CLINIC | Facility: CLINIC | Age: 53
End: 2023-06-03

## 2023-06-06 RX ORDER — DULAGLUTIDE 0.75 MG/.5ML
0.75 INJECTION, SOLUTION SUBCUTANEOUS
Qty: 4 EACH | Refills: 1 | Status: SHIPPED | OUTPATIENT
Start: 2023-06-06

## 2023-06-06 NOTE — TELEPHONE ENCOUNTER
Can check with patient if she wants to switch. She has been on Ozempic and very well controlled I am not sure why this is an issue now versus its been filled at the pharmacy before and her insurance is authorized it before.

## 2023-06-12 NOTE — TELEPHONE ENCOUNTER
Approved    Prior authorization approved Case ID: [id number]      Payer:  [address]  [id number] [facility]  [phone]    TGIOIC:[id number];DANIELRIV:[id number]; Review Type:Prior Auth; Coverage Start Date:05/13/2023; Coverage End Date:06/11/2024;    Approval Details    Authorized from May 13, 2023 to June 11, 2024      Electronic appeal:  Not supported   View History

## 2023-07-12 ENCOUNTER — WALK IN (OUTPATIENT)
Dept: URGENT CARE | Age: 53
End: 2023-07-12

## 2023-07-12 ENCOUNTER — IMAGING SERVICES (OUTPATIENT)
Dept: GENERAL RADIOLOGY | Age: 53
End: 2023-07-12
Attending: STUDENT IN AN ORGANIZED HEALTH CARE EDUCATION/TRAINING PROGRAM

## 2023-07-12 VITALS
OXYGEN SATURATION: 97 % | TEMPERATURE: 97.5 F | BODY MASS INDEX: 42.29 KG/M2 | WEIGHT: 262 LBS | HEART RATE: 63 BPM | RESPIRATION RATE: 15 BRPM | SYSTOLIC BLOOD PRESSURE: 124 MMHG | DIASTOLIC BLOOD PRESSURE: 72 MMHG

## 2023-07-12 DIAGNOSIS — S99.911A INJURY OF RIGHT ANKLE, INITIAL ENCOUNTER: ICD-10-CM

## 2023-07-12 DIAGNOSIS — S82.831A OTHER CLOSED FRACTURE OF DISTAL END OF RIGHT FIBULA, INITIAL ENCOUNTER: Primary | ICD-10-CM

## 2023-07-12 PROCEDURE — 73630 X-RAY EXAM OF FOOT: CPT | Performed by: RADIOLOGY

## 2023-07-12 PROCEDURE — 73610 X-RAY EXAM OF ANKLE: CPT | Performed by: RADIOLOGY

## 2023-07-12 PROCEDURE — 99204 OFFICE O/P NEW MOD 45 MIN: CPT | Performed by: STUDENT IN AN ORGANIZED HEALTH CARE EDUCATION/TRAINING PROGRAM

## 2023-07-12 PROCEDURE — E0114 CRUTCH UNDERARM PAIR NO WOOD: HCPCS | Performed by: STUDENT IN AN ORGANIZED HEALTH CARE EDUCATION/TRAINING PROGRAM

## 2023-07-12 RX ORDER — PEN NEEDLE, DIABETIC 32GX 5/32"
NEEDLE, DISPOSABLE MISCELLANEOUS
COMMUNITY
Start: 2023-06-17

## 2023-07-12 RX ORDER — INSULIN DETEMIR 100 [IU]/ML
INJECTION, SOLUTION SUBCUTANEOUS
COMMUNITY
Start: 2023-05-24

## 2023-07-12 RX ORDER — SPIRONOLACTONE 100 MG/1
150 TABLET, FILM COATED ORAL 2 TIMES DAILY
COMMUNITY
Start: 2023-05-26

## 2023-07-12 RX ORDER — FLASH GLUCOSE SENSOR
KIT MISCELLANEOUS
COMMUNITY
Start: 2023-05-03

## 2023-07-12 RX ORDER — CANDESARTAN 8 MG/1
TABLET ORAL
COMMUNITY
Start: 2023-05-29

## 2023-07-12 RX ORDER — SEMAGLUTIDE 1.34 MG/ML
INJECTION, SOLUTION SUBCUTANEOUS
COMMUNITY
Start: 2023-05-09

## 2023-07-12 RX ORDER — AMLODIPINE BESYLATE 2.5 MG/1
TABLET ORAL
COMMUNITY
Start: 2023-04-25

## 2023-07-12 RX ORDER — ATENOLOL 25 MG/1
TABLET ORAL
COMMUNITY
Start: 2023-06-10

## 2023-07-12 RX ORDER — LEVOTHYROXINE SODIUM 100 MCG
TABLET ORAL
COMMUNITY
Start: 2023-07-06

## 2023-07-12 RX ORDER — ZOLPIDEM TARTRATE 5 MG/1
TABLET ORAL
COMMUNITY
Start: 2023-04-25

## 2023-07-12 ASSESSMENT — PAIN SCALES - GENERAL: PAINLEVEL: 6

## 2023-07-17 ENCOUNTER — MED REC SCAN ONLY (OUTPATIENT)
Dept: INTERNAL MEDICINE CLINIC | Facility: CLINIC | Age: 53
End: 2023-07-17

## 2023-07-19 ENCOUNTER — HOSPITAL ENCOUNTER (OUTPATIENT)
Dept: MRI IMAGING | Facility: HOSPITAL | Age: 53
Discharge: HOME | End: 2023-07-19
Attending: UROLOGY
Payer: COMMERCIAL

## 2023-07-19 DIAGNOSIS — C64.9 CANCER OF KIDNEY (HCC): ICD-10-CM

## 2023-07-19 PROCEDURE — 74183 MRI ABD W/O CNTR FLWD CNTR: CPT | Performed by: UROLOGY

## 2023-07-19 PROCEDURE — A9575 INJ GADOTERATE MEGLUMI 0.1ML: HCPCS | Performed by: UROLOGY

## 2023-07-19 RX ORDER — GADOTERATE MEGLUMINE 376.9 MG/ML
20 INJECTION INTRAVENOUS
Status: COMPLETED | OUTPATIENT
Start: 2023-07-19 | End: 2023-07-19

## 2023-07-19 RX ADMIN — GADOTERATE MEGLUMINE 20 ML: 376.9 INJECTION INTRAVENOUS at 17:11:00

## 2023-08-07 PROBLEM — S82.839A CLOSED FRACTURE OF DISTAL FIBULA: Status: ACTIVE | Noted: 2023-07-17

## 2023-08-08 ENCOUNTER — OFFICE VISIT (OUTPATIENT)
Dept: INTERNAL MEDICINE CLINIC | Facility: CLINIC | Age: 53
End: 2023-08-08

## 2023-08-08 VITALS
HEIGHT: 66 IN | BODY MASS INDEX: 42.27 KG/M2 | WEIGHT: 263 LBS | DIASTOLIC BLOOD PRESSURE: 70 MMHG | SYSTOLIC BLOOD PRESSURE: 112 MMHG | TEMPERATURE: 98 F | HEART RATE: 66 BPM

## 2023-08-08 DIAGNOSIS — E03.4 HYPOTHYROIDISM DUE TO ACQUIRED ATROPHY OF THYROID: ICD-10-CM

## 2023-08-08 DIAGNOSIS — E11.9 TYPE 2 DIABETES MELLITUS WITHOUT COMPLICATION, WITHOUT LONG-TERM CURRENT USE OF INSULIN (HCC): Primary | ICD-10-CM

## 2023-08-08 DIAGNOSIS — Z12.31 ENCOUNTER FOR SCREENING MAMMOGRAM FOR MALIGNANT NEOPLASM OF BREAST: ICD-10-CM

## 2023-08-08 DIAGNOSIS — N18.2 CHRONIC KIDNEY DISEASE (CKD) STAGE G2/A2, MILDLY DECREASED GLOMERULAR FILTRATION RATE (GFR) BETWEEN 60-89 ML/MIN/1.73 SQUARE METER AND ALBUMINURIA CREATININE RATIO BETWEEN 30-299 MG/G: ICD-10-CM

## 2023-08-08 DIAGNOSIS — Z71.85 VACCINE COUNSELING: ICD-10-CM

## 2023-08-08 DIAGNOSIS — E04.2 MULTINODULAR GOITER: ICD-10-CM

## 2023-08-08 DIAGNOSIS — C64.1 RENAL CELL CARCINOMA OF RIGHT KIDNEY (HCC): ICD-10-CM

## 2023-08-08 DIAGNOSIS — E55.9 VITAMIN D DEFICIENCY: ICD-10-CM

## 2023-08-08 DIAGNOSIS — R05.3 CHRONIC COUGH: ICD-10-CM

## 2023-08-08 DIAGNOSIS — I10 PRIMARY HYPERTENSION: ICD-10-CM

## 2023-08-08 PROCEDURE — 3008F BODY MASS INDEX DOCD: CPT | Performed by: INTERNAL MEDICINE

## 2023-08-08 PROCEDURE — 3074F SYST BP LT 130 MM HG: CPT | Performed by: INTERNAL MEDICINE

## 2023-08-08 PROCEDURE — 99214 OFFICE O/P EST MOD 30 MIN: CPT | Performed by: INTERNAL MEDICINE

## 2023-08-08 PROCEDURE — 3078F DIAST BP <80 MM HG: CPT | Performed by: INTERNAL MEDICINE

## 2023-08-08 NOTE — PATIENT INSTRUCTIONS
ASSESSMENT/PLAN:   Type 2 diabetes mellitus without complication, without long-term current use of insulin (hcc)  (primary encounter diagnosis) Higher. Increase ozempic. Call in 2 weeks. Fu endoc. Careful with diet and excercise at least 30 minutes 3-4 times a week. Check sugars at different times on different dates. Careful with low sugars. Carry something with you and check sugar if can. Can carry aristides cracker, etc. Decrease carbohydrates. But also, careful with fruits and natural sugars. One serving a day and no more than 1 handful every day. Check feet  every AM and careful with sores and ulcers on feet bilaterally. Check eyes every year with dilated eye exam.  Check sugars. 2-hour postmeal should be less than 140s. Pre-meal should be 's. Both equally affected A1c. Discussed importance of glycemic control to prevent complications of diabetes  -Discussed complications of diabetes include retinopathy, neuropathy, nephropathy and cardiovascular disease  -Discussed ABCs of DM  -Discussed importance of SBGM  -Discussed importance of low CHO diet, recommend 45gm per meal or 135gm per day  -UTD with [employer], has ashleigh't. [facility]. -Normotensive     Primary hypertension Stable. Careful with diet and excercise at least 30 minutes 3-4 times a week. Check blood pressures at different times on different days. Can purchase own blood pressure monitor. If not, check at local pharmacy. Bake foods more and grill occasionally. Avoid fried foods. No salt. Use other seasonings. Hypothyroidism due to acquired atrophy of thyroid Check blood. [place] [address] once a week. Chronic kidney disease (ckd) stage g2/a2, mildly decreased glomerular filtration rate (gfr) between 60-89 ml/min/1.73 square meter and albuminuria creatinine ratio between  mg/g No motrin, ibuprofen, advil, alleve, naprosyn  with these medications.       Vaccine counseling Up to date. Multinodular goiter Stable. Encounter for screening mammogram for malignant neoplasm of breast Normal mammogram. Continue self breast exam every month. Renal cell carcinoma of right kidney (hcc)Sp Sx. stable. Follow-up with urology. Chronic cough Stable. Maybe some PND on exam. Patient states hold therapy for now. Patient states not bothersome. Orders Placed This Encounter      TSH W Reflex To Free T4      Free T4, (Free Thyroxine)      Vitamin D      Meds This Visit:  Requested Prescriptions     Signed Prescriptions Disp Refills    semaglutide 8 MG/3ML Subcutaneous Solution Pen-injector 3 each 1     Sig: Inject 2 mg into the skin once a week. Imaging & Referrals:  None      RTC 3 months FU.

## 2023-08-14 LAB
AMB EXT ANION GAP: 7
AMB EXT BILIRUBIN, TOTAL: 0.5 MG/DL
AMB EXT BUN: 18 MG/DL
AMB EXT CALCIUM: 9.3
AMB EXT CARBON DIOXIDE: 26
AMB EXT CHLORIDE: 103
AMB EXT CHOLESTEROL, TOTAL: 159 MG/DL
AMB EXT CMP ALT: 49 U/L
AMB EXT CMP AST: 36 U/L
AMB EXT CREATININE: 0.99 MG/DL
AMB EXT EGFR NON-AA: 68
AMB EXT GLUCOSE: 178 MG/DL
AMB EXT HDL CHOLESTEROL: 46 MG/DL
AMB EXT HEMATOCRIT: 47
AMB EXT HEMOGLOBIN: 16.9
AMB EXT HGBA1C: 8 %
AMB EXT LDL CHOLESTEROL, DIRECT: 91 MG/DL
AMB EXT MALB URINE CALC: 1 MG/24HR
AMB EXT MCV: 91.3
AMB EXT PLATELETS: 242
AMB EXT POSTASSIUM: 4.4 MMOL/L
AMB EXT SODIUM: 136 MMOL/L
AMB EXT TOTAL PROTEIN: 7.4
AMB EXT TRIGLYCERIDES: 119 MG/DL
AMB EXT TSH: 1.81 MIU/ML
AMB EXT VITAMIN D, 25-HYDROXY: 46.8
AMB EXT WBC: 12.8 X10(3)UL

## 2023-08-15 ENCOUNTER — MED REC SCAN ONLY (OUTPATIENT)
Dept: INTERNAL MEDICINE CLINIC | Facility: CLINIC | Age: 53
End: 2023-08-15

## 2023-08-15 ENCOUNTER — TELEPHONE (OUTPATIENT)
Dept: INTERNAL MEDICINE CLINIC | Facility: CLINIC | Age: 53
End: 2023-08-15

## 2023-08-15 ENCOUNTER — PATIENT MESSAGE (OUTPATIENT)
Dept: INTERNAL MEDICINE CLINIC | Facility: CLINIC | Age: 53
End: 2023-08-15

## 2023-08-15 DIAGNOSIS — D72.820 LYMPHOCYTOSIS: ICD-10-CM

## 2023-08-15 DIAGNOSIS — E11.9 TYPE 2 DIABETES MELLITUS WITHOUT COMPLICATION, WITHOUT LONG-TERM CURRENT USE OF INSULIN (HCC): Primary | ICD-10-CM

## 2023-08-15 NOTE — TELEPHONE ENCOUNTER
Message left for patient that PA for Mark Anthony Mcfadden has been approved from 07/16/2023 to 08/14/2024.

## 2023-08-16 NOTE — TELEPHONE ENCOUNTER
Dr Kobe Bailey  =see attached test results, thanks. From: Naz Ayala  To: Nisha Dowd. Kobe Bailey MD  Sent: 8/15/2023  2:21 PM CDT  Subject: Test Results from [facility] visit 8-    Dr Kobe Bailey-    Attached please find the various test results from my Executive Physical [place] (8[place]14[place] [facility]. These are their standard tests plus any in the \"orders\" you had shared with me to also have them do instead of doing them when I saw you last week. I still have the visit to Endocrinologist coming up on 8/21.     Sharath Gomez

## 2023-08-21 ENCOUNTER — PATIENT MESSAGE (OUTPATIENT)
Dept: INTERNAL MEDICINE CLINIC | Facility: CLINIC | Age: 53
End: 2023-08-21

## 2023-08-21 DIAGNOSIS — E11.9 TYPE 2 DIABETES MELLITUS WITHOUT COMPLICATION, WITHOUT LONG-TERM CURRENT USE OF INSULIN (HCC): Primary | ICD-10-CM

## 2023-08-22 NOTE — TELEPHONE ENCOUNTER
From: Anna Castellanos  To: Letitia Barajas. Kamini Cowart MD  Sent: 8/21/2023 3:15 PM CDT  Subject: Endocrinologist Visit    Dr Kamini Cowart-  I had the Endocrinologist visit this morning associated with my [facility] [place] [facility] [place] [facility] physical. Just thought I would pass on his recommendations  1. Move to the 2MG dose of Ozempic  2. Drop Levimir from 35 units morning /45 units evening to 32/32. If higher Ozempic causes sugar lows to drop Levimir by 2 units at a time until no more lows. 3. Add low dose Crestor (said good for all diabetics to take this kind of drug even if cholesterol only modestly high). 4. Do Fructosamine & Hb A1C again when next I do any bloodwork for you (gave me orders). 5. Wants a bone density test in 6 mo (not sure if because he saw my broken ankle or if normal test he does). Wait 6 months to do test because of extra vitamin D i'm taking for ankle right now. You may be able to see his notes in MyChart as I think I linked to [facility], but just passing along the summary.   Von Reyna

## 2023-08-24 RX ORDER — ROSUVASTATIN CALCIUM 5 MG/1
5 TABLET, COATED ORAL NIGHTLY
COMMUNITY
Start: 2023-08-21

## 2023-08-24 RX ORDER — INSULIN DETEMIR 100 [IU]/ML
INJECTION, SOLUTION SUBCUTANEOUS
Qty: 45 ML | Refills: 3 | Status: SHIPPED | OUTPATIENT
Start: 2023-08-24

## 2023-08-24 RX ORDER — SPIRONOLACTONE 100 MG/1
TABLET, FILM COATED ORAL
Qty: 135 TABLET | Refills: 3 | Status: SHIPPED | OUTPATIENT
Start: 2023-08-24

## 2023-08-24 NOTE — TELEPHONE ENCOUNTER
Refill passed per [place] [employer] [facility] protocol. Please review pended refill request as unable to refill due to high/very high interaction warning copied here:      High  Drug-Drug: spironolactone and Candesartan CilexetilThe risk of hyperkalemia may be increased when potassium-sparing diuretics are co-administered with angiotensin II receptor antagonists.     Requested Prescriptions   Pending Prescriptions Disp Refills    SPIRONOLACTONE 100 MG Oral Tab [Pharmacy Med Name: SPIRONOLACTONE TABS 100MG] 135 tablet 3     Sig: TAKE 1 TABLET EVERY MORNING AND ONE-HALF (1/2) TABLET IN THE EVENING       Hypertensive Medications Protocol Passed - 8/24/2023 12:14 AM        Passed - In person appointment in the past 12 or next 3 months     Recent Outpatient Visits              2 weeks ago Type 2 diabetes mellitus without complication, without long-term current use of insulin ([facility]    [address], [address], Divya Chavarria MD    Office Visit    4 months ago Type 2 diabetes mellitus without complication, without long-term current use of insulin ([facility]    [address], [address], Divya Chavarria MD    Office Visit    9 months ago Type 2 diabetes mellitus without complication, without long-term current use of insulin ([facility]    [address], [address], Divya Chavarria MD    Office Visit    1 year ago Elevated liver enzymes    [address], Wilbur Cordero MD    Office Visit    1 year ago Chronic kidney disease (CKD) stage G2/A2, mildly decreased glomerular filtration rate (GFR) between 60-89 mL/min/1.73 square meter and albuminuria creatinine ratio between  mg/g    [address], [address], Divya Chavarria MD    Office Visit          Future Appointments         Provider Department Appt Notes    In 4 months MD [facility] [place] Sebastián Serrato 3 MTH FOLLOW UP               Passed - Last BP reading less than 140/90     BP Readings from Last 1 Encounters:  08/08/23 : 112/70              Passed - CMP or BMP in past 6 months     Recent Results (from the past 4392 hour(s))   COMP METABOLIC PANEL (14)    Collection Time: 08/14/23 12:00 AM   Result Value Ref Range    Bilirubin, Total 0.5 mg/dL    AST 36 U/L    ALT 49 U/L    eGFR non- 68     TP 7.4      *Note: Due to a large number of results and/or encounters for the requested time period, some results have not been displayed. A complete set of results can be found in Results Review.                Passed - In person appointment or virtual visit in the past 6 months     Recent Outpatient Visits              2 weeks ago Type 2 diabetes mellitus without complication, without long-term current use of insulin ([address]    [address], [address] [facility] MD    Office Visit    4 months ago Type 2 diabetes mellitus without complication, without long-term current use of insulin ([address]    [address], [address] [facility] MD    Office Visit    9 months ago Type 2 diabetes mellitus without complication, without long-term current use of insulin ([address]    [address], [address] [facility] MD    Office Visit    1 year ago Elevated liver enzymes    [address], Lacretipat Roger., MD    Office Visit    1 year ago Chronic kidney disease (CKD) stage G2/A2, mildly decreased glomerular filtration rate (GFR) between 60-89 mL/min/1.73 square meter and albuminuria creatinine ratio between  mg/g    [address], [address] [facility] MD    Office Visit          Future Appointments         Provider Department Appt Notes    In 4 months Elan Teague MD [address], [address] [facility], [place] 3 MTH FOLLOW UP               Passed - EGFRCR or GFRNAA > 50     GFR Evaluation  EGFRCR: 70 , resulted on 4/26/2023             Recent Outpatient Visits              2 weeks ago Type 2 diabetes mellitus without complication, without long-term current use of insulin ([facility])    Kit Castillo [address], Nav Miller MD    Office Visit    4 months ago Type 2 diabetes mellitus without complication, without long-term current use of insulin ([address]    Kit Castillo [address], Nav Miller MD    Office Visit    9 months ago Type 2 diabetes mellitus without complication, without long-term current use of insulin ([address]    Kit Castillo [address] MD    Office Visit    1 year ago Elevated liver enzymes    Shannan Mejía MD    Office Visit    1 year ago Chronic kidney disease (CKD) stage G2/A2, mildly decreased glomerular filtration rate (GFR) between 60-89 mL/min/1.73 square meter and albuminuria creatinine ratio between  mg/g    [address]Nav MD    Office Visit          Future Appointments         Provider Department Appt Notes    In 4 months Alicia Stark MD [address] [place] [address] FOLLOW UP

## 2023-08-28 ENCOUNTER — APPOINTMENT (OUTPATIENT)
Dept: URBAN - METROPOLITAN AREA CLINIC 244 | Age: 53
Setting detail: DERMATOLOGY
End: 2023-08-31

## 2023-08-28 DIAGNOSIS — L21.8 OTHER SEBORRHEIC DERMATITIS: ICD-10-CM

## 2023-08-28 DIAGNOSIS — R21 RASH AND OTHER NONSPECIFIC SKIN ERUPTION: ICD-10-CM

## 2023-08-28 DIAGNOSIS — L82.1 OTHER SEBORRHEIC KERATOSIS: ICD-10-CM

## 2023-08-28 DIAGNOSIS — D22 MELANOCYTIC NEVI: ICD-10-CM

## 2023-08-28 DIAGNOSIS — L81.4 OTHER MELANIN HYPERPIGMENTATION: ICD-10-CM

## 2023-08-28 PROBLEM — D22.5 MELANOCYTIC NEVI OF TRUNK: Status: ACTIVE | Noted: 2023-08-28

## 2023-08-28 PROCEDURE — OTHER PRESCRIPTION: OTHER

## 2023-08-28 PROCEDURE — OTHER ADDITIONAL NOTES: OTHER

## 2023-08-28 PROCEDURE — 99204 OFFICE O/P NEW MOD 45 MIN: CPT

## 2023-08-28 PROCEDURE — OTHER COUNSELING: OTHER

## 2023-08-28 RX ORDER — KETOCONAZOLE 20 MG/G
CREAM TOPICAL BID
Qty: 60 | Refills: 3 | Status: ERX | COMMUNITY
Start: 2023-08-28

## 2023-08-28 ASSESSMENT — LOCATION SIMPLE DESCRIPTION DERM
LOCATION SIMPLE: ABDOMEN
LOCATION SIMPLE: RIGHT EAR
LOCATION SIMPLE: LEFT INFERIOR EYELID
LOCATION SIMPLE: LEFT EAR

## 2023-08-28 ASSESSMENT — LOCATION DETAILED DESCRIPTION DERM
LOCATION DETAILED: LEFT POSTERIOR EAR
LOCATION DETAILED: PERIUMBILICAL SKIN
LOCATION DETAILED: LEFT LATERAL INFERIOR EYELID
LOCATION DETAILED: RIGHT POSTERIOR EAR

## 2023-08-28 ASSESSMENT — LOCATION ZONE DERM
LOCATION ZONE: TRUNK
LOCATION ZONE: EAR
LOCATION ZONE: EYELID

## 2023-08-28 NOTE — PROCEDURE: ADDITIONAL NOTES
Detail Level: Simple
Render Risk Assessment In Note?: no
Additional Notes: Photo taken.  rtc if not resolved in 1 mo

## 2023-09-13 RX ORDER — ATENOLOL 25 MG/1
25 TABLET ORAL NIGHTLY
Qty: 90 TABLET | Refills: 1 | Status: SHIPPED | OUTPATIENT
Start: 2023-09-13 | End: 2023-09-14

## 2023-09-14 RX ORDER — ATENOLOL 25 MG/1
25 TABLET ORAL NIGHTLY
Qty: 90 TABLET | Refills: 3 | Status: SHIPPED | OUTPATIENT
Start: 2023-09-14 | End: 2023-09-15

## 2023-09-15 RX ORDER — ATENOLOL 25 MG/1
25 TABLET ORAL NIGHTLY
Qty: 90 TABLET | Refills: 3 | Status: SHIPPED | OUTPATIENT
Start: 2023-09-15

## 2023-10-20 RX ORDER — AMLODIPINE BESYLATE 2.5 MG/1
2.5 TABLET ORAL 2 TIMES DAILY
Qty: 180 TABLET | Refills: 3 | Status: SHIPPED | OUTPATIENT
Start: 2023-10-20

## 2023-10-20 NOTE — TELEPHONE ENCOUNTER
Refill passed per [email or web address] protocol.     Requested Prescriptions   Pending Prescriptions Disp Refills    AMLODIPINE 2.5 MG Oral Tab [Pharmacy Med Name: AMLODIPINE BESYLATE TABS 2.5MG] 180 tablet 3     Sig: TAKE 1 TABLET TWICE A DAY       Hypertensive Medications Protocol Passed - 10/20/2023 12:16 AM        Passed - In person appointment in the past 12 or next 3 months     Recent Outpatient Visits              2 months ago Type 2 diabetes mellitus without complication, without long-term current use of insulin ([facility]    [address], [address], Matias Wade MD    Office Visit    5 months ago Type 2 diabetes mellitus without complication, without long-term current use of insulin ([facility]    [address], [address], Matias Wade MD    Office Visit    11 months ago Type 2 diabetes mellitus without complication, without long-term current use of insulin ([facility]    [address], [address], Matias Wade MD    Office Visit    1 year ago Elevated liver enzymes    [address], [address] [place] , Bobby Horn MD    Office Visit    1 year ago Chronic kidney disease (CKD) stage G2/A2, mildly decreased glomerular filtration rate (GFR) between 60-89 mL/min/1.73 square meter and albuminuria creatinine ratio between  mg/g    [address], [address], Matias Wade MD    Office Visit          Future Appointments         Provider Department Appt Notes    In 2 months Bartolo Spencer.[facility], [address], [place] 3 [facility] FOLLOW UP                      Passed - Last BP reading less than 140/90     BP Readings from Last 1 Encounters:  08/08/23 : 112/70              Passed - CMP or BMP in past 6 months     Recent Results (from the past 4392 hour(s))   Comp Metabolic Panel (14)    Collection Time: 08/14/23 12:00 AM   Result Value Ref Range Bilirubin, Total 0.5 mg/dL    AST 36 U/L    ALT 49 U/L    eGFR non- 68     TP 7.4      *Note: Due to a large number of results and/or encounters for the requested time period, some results have not been displayed. A complete set of results can be found in Results Review.                Passed - In person appointment or virtual visit in the past 6 months     Recent Outpatient Visits              2 months ago Type 2 diabetes mellitus without complication, without long-term current use of insulin ([facility] [address]    [address], [address], Hermon Backbone., MD    Office Visit    5 months ago Type 2 diabetes mellitus without complication, without long-term current use of insulin ([facility]    [address], [address], Hermon Backbone., MD    Office Visit    11 months ago Type 2 diabetes mellitus without complication, without long-term current use of insulin ([facility] [address]    [address], [address], Dony Grimaldo., MD    Office Visit    1 year ago Elevated liver enzymes    [address], Rivka Rainey MD    Office Visit    1 year ago Chronic kidney disease (CKD) stage G2/A2, mildly decreased glomerular filtration rate (GFR) between 60-89 mL/min/1.73 square meter and albuminuria creatinine ratio between  mg/g    [address], [address], Dony Grimaldo., MD    Office Visit          Future Appointments         Provider Department Appt Notes    In 2 months MD [facility], [address], [place] 3 [facility] FOLLOW UP                      Passed - EGFRCR or GFRNAA > 50     GFR Evaluation  EGFRCR: 70 , resulted on 4/26/2023             Future Appointments         Provider Department Appt Notes    In 2 months Dustin Titus MD [address], [address], [place] 3 [facility] FOLLOW UP          Recent Outpatient Visits 2 months ago Type 2 diabetes mellitus without complication, without long-term current use of insulin ([facility])    Rudy Patel, [address], Bynum Gitelman., MD    Office Visit    5 months ago Type 2 diabetes mellitus without complication, without long-term current use of insulin ([facility] [address] [facility]    Rudy Patel, [address], Bynum Gitelman., MD    Office Visit    11 months ago Type 2 diabetes mellitus without complication, without long-term current use of insulin ([facility] [address]    Rudy Patel, [address], Bynum Gitelman., MD    Office Visit    1 year ago Elevated liver enzymes    Ayaz Barros., MD    Office Visit    1 year ago Chronic kidney disease (CKD) stage G2/A2, mildly decreased glomerular filtration rate (GFR) between 60-89 mL/min/1.73 square meter and albuminuria creatinine ratio between  mg/g    Rudy Patel [address], Bynum Gitelman., MD    Office Visit

## 2023-10-31 ENCOUNTER — MED REC SCAN ONLY (OUTPATIENT)
Dept: INTERNAL MEDICINE CLINIC | Facility: CLINIC | Age: 53
End: 2023-10-31

## 2023-12-05 RX ORDER — SEMAGLUTIDE 1.34 MG/ML
1 INJECTION, SOLUTION SUBCUTANEOUS
Qty: 9 ML | Refills: 3 | OUTPATIENT
Start: 2023-12-05

## 2023-12-06 ENCOUNTER — MED REC SCAN ONLY (OUTPATIENT)
Dept: INTERNAL MEDICINE CLINIC | Facility: CLINIC | Age: 53
End: 2023-12-06

## 2023-12-17 ENCOUNTER — LAB ENCOUNTER (OUTPATIENT)
Dept: LAB | Facility: HOSPITAL | Age: 53
End: 2023-12-17
Attending: ORTHOPAEDIC SURGERY
Payer: COMMERCIAL

## 2023-12-17 DIAGNOSIS — S82.64XK: Primary | ICD-10-CM

## 2023-12-17 DIAGNOSIS — S82.64XK: ICD-10-CM

## 2023-12-17 LAB
ANION GAP SERPL CALC-SCNC: 6 MMOL/L (ref 0–18)
ATRIAL RATE: 57 BPM
BASOPHILS # BLD AUTO: 0.03 X10(3) UL (ref 0–0.2)
BASOPHILS NFR BLD AUTO: 0.5 %
BILIRUB UR QL: NEGATIVE
BUN BLD-MCNC: 15 MG/DL (ref 9–23)
BUN/CREAT SERPL: 14.3 (ref 10–20)
CALCIUM BLD-MCNC: 9.7 MG/DL (ref 8.7–10.4)
CHLORIDE SERPL-SCNC: 103 MMOL/L (ref 98–112)
CLARITY UR: CLEAR
CO2 SERPL-SCNC: 26 MMOL/L (ref 21–32)
COLOR UR: YELLOW
CREAT BLD-MCNC: 1.05 MG/DL
DEPRECATED RDW RBC AUTO: 43.7 FL (ref 35.1–46.3)
EGFRCR SERPLBLD CKD-EPI 2021: 63 ML/MIN/1.73M2 (ref 60–?)
EOSINOPHIL # BLD AUTO: 0.1 X10(3) UL (ref 0–0.7)
EOSINOPHIL NFR BLD AUTO: 1.5 %
ERYTHROCYTE [DISTWIDTH] IN BLOOD BY AUTOMATED COUNT: 12.7 % (ref 11–15)
FASTING STATUS PATIENT QL REPORTED: YES
GLUCOSE BLD-MCNC: 182 MG/DL (ref 70–99)
GLUCOSE UR-MCNC: NORMAL MG/DL
HCT VFR BLD AUTO: 47.8 %
HGB BLD-MCNC: 15.7 G/DL
HYALINE CASTS #/AREA URNS AUTO: PRESENT /LPF
IMM GRANULOCYTES # BLD AUTO: 0.01 X10(3) UL (ref 0–1)
IMM GRANULOCYTES NFR BLD: 0.2 %
INR BLD: 0.99 (ref 0.8–1.2)
KETONES UR-MCNC: NEGATIVE MG/DL
LEUKOCYTE ESTERASE UR QL STRIP.AUTO: 250
LYMPHOCYTES # BLD AUTO: 2 X10(3) UL (ref 1–4)
LYMPHOCYTES NFR BLD AUTO: 30.2 %
MCH RBC QN AUTO: 30.5 PG (ref 26–34)
MCHC RBC AUTO-ENTMCNC: 32.8 G/DL (ref 31–37)
MCV RBC AUTO: 93 FL
MONOCYTES # BLD AUTO: 0.61 X10(3) UL (ref 0.1–1)
MONOCYTES NFR BLD AUTO: 9.2 %
NEUTROPHILS # BLD AUTO: 3.88 X10 (3) UL (ref 1.5–7.7)
NEUTROPHILS # BLD AUTO: 3.88 X10(3) UL (ref 1.5–7.7)
NEUTROPHILS NFR BLD AUTO: 58.4 %
NITRITE UR QL STRIP.AUTO: NEGATIVE
OSMOLALITY SERPL CALC.SUM OF ELEC: 285 MOSM/KG (ref 275–295)
P AXIS: 35 DEGREES
P-R INTERVAL: 146 MS
PH UR: 5.5 [PH] (ref 5–8)
PLATELET # BLD AUTO: 259 10(3)UL (ref 150–450)
POTASSIUM SERPL-SCNC: 4.3 MMOL/L (ref 3.5–5.1)
PROT UR-MCNC: 20 MG/DL
PROTHROMBIN TIME: 13.7 SECONDS (ref 11.6–14.8)
Q-T INTERVAL: 412 MS
QRS DURATION: 80 MS
QTC CALCULATION (BEZET): 401 MS
R AXIS: 38 DEGREES
RBC # BLD AUTO: 5.14 X10(6)UL
SODIUM SERPL-SCNC: 135 MMOL/L (ref 136–145)
SP GR UR STRIP: 1.03 (ref 1–1.03)
T AXIS: 27 DEGREES
UROBILINOGEN UR STRIP-ACNC: NORMAL
VENTRICULAR RATE: 57 BPM
WBC # BLD AUTO: 6.6 X10(3) UL (ref 4–11)

## 2023-12-17 PROCEDURE — 36415 COLL VENOUS BLD VENIPUNCTURE: CPT

## 2023-12-17 PROCEDURE — 85025 COMPLETE CBC W/AUTO DIFF WBC: CPT

## 2023-12-17 PROCEDURE — 93010 ELECTROCARDIOGRAM REPORT: CPT | Performed by: INTERNAL MEDICINE

## 2023-12-17 PROCEDURE — 93005 ELECTROCARDIOGRAM TRACING: CPT

## 2023-12-17 PROCEDURE — 85610 PROTHROMBIN TIME: CPT

## 2023-12-17 PROCEDURE — 81001 URINALYSIS AUTO W/SCOPE: CPT

## 2023-12-17 PROCEDURE — 80048 BASIC METABOLIC PNL TOTAL CA: CPT

## 2023-12-19 ENCOUNTER — HOSPITAL ENCOUNTER (OUTPATIENT)
Dept: GENERAL RADIOLOGY | Facility: HOSPITAL | Age: 53
Discharge: HOME | End: 2023-12-19
Attending: ORTHOPAEDIC SURGERY
Payer: COMMERCIAL

## 2023-12-19 DIAGNOSIS — S82.891A: ICD-10-CM

## 2023-12-19 PROCEDURE — 71046 X-RAY EXAM CHEST 2 VIEWS: CPT | Performed by: ORTHOPAEDIC SURGERY

## 2024-01-18 DIAGNOSIS — E11.9 TYPE 2 DIABETES MELLITUS WITHOUT COMPLICATION, WITHOUT LONG-TERM CURRENT USE OF INSULIN (HCC): ICD-10-CM

## 2024-01-19 RX ORDER — SEMAGLUTIDE 2.68 MG/ML
2 INJECTION, SOLUTION SUBCUTANEOUS WEEKLY
Qty: 9 ML | Refills: 3 | Status: SHIPPED | OUTPATIENT
Start: 2024-01-19

## 2024-01-19 NOTE — TELEPHONE ENCOUNTER
Please review. Protocol failed / No Protocol.    Requested Prescriptions   Pending Prescriptions Disp Refills    OZEMPIC, 2 MG/DOSE, 8 MG/3ML Subcutaneous Solution Pen-injector [Pharmacy Med Name: OZEMPIC PEN (2MG/DOSE) 3ML 8MG/3ML] 9 mL 3     Sig: INJECT 2 MG UNDER THE SKIN WEEKLY       Diabetes Medication Protocol Failed - 1/18/2024 12:04 AM        Failed - Last A1C < 7.5 and within past 6 months     Lab Results   Component Value Date    A1C 8.0 08/14/2023             Passed - In person appointment or virtual visit in the past 6 mos or appointment in next 3 mos     Recent Outpatient Visits              5 months ago Type 2 diabetes mellitus without complication, without long-term current use of insulin ([facility])    [facility]    Office Visit    8 months ago Type 2 diabetes mellitus without complication, without long-term current use of insulin ([facility])    [facility] [place]ila Villalpando MD    Office Visit    1 year ago Type 2 diabetes mellitus without complication, without long-term current use of insulin ([facility])    [facility]    Office Visit    1 year ago Elevated liver enzymes    [facility]    Office Visit    1 year ago Chronic kidney disease (CKD) stage G2/A2, mildly decreased glomerular filtration rate (GFR) between 60-89 mL/min/1.73 square meter and albuminuria creatinine ratio between  mg/g    [facility]    Office Visit          Future Appointments         Provider Department Appt Notes    In 2 weeks Leonila Maradiaga MD [facility] [place] [facility] MTH FOLLOW UP               Passed - EGFRCR or GFRNAA > 50     GFR Evaluation  EGFRCR: 63 , resulted on 12/17/2023          Passed - GFR in the  past 12 months

## 2024-02-04 PROBLEM — H02.403 PTOSIS OF BOTH EYELIDS: Status: ACTIVE | Noted: 2023-08-14

## 2024-02-04 PROBLEM — S82.63XA: Status: ACTIVE | Noted: 2023-10-30

## 2024-02-05 ENCOUNTER — OFFICE VISIT (OUTPATIENT)
Dept: INTERNAL MEDICINE CLINIC | Facility: CLINIC | Age: 54
End: 2024-02-05
Payer: COMMERCIAL

## 2024-02-05 VITALS
DIASTOLIC BLOOD PRESSURE: 85 MMHG | HEIGHT: 66 IN | WEIGHT: 255 LBS | TEMPERATURE: 98 F | SYSTOLIC BLOOD PRESSURE: 123 MMHG | OXYGEN SATURATION: 99 % | HEART RATE: 84 BPM | BODY MASS INDEX: 40.98 KG/M2

## 2024-02-05 DIAGNOSIS — M25.551 PAIN OF RIGHT HIP: ICD-10-CM

## 2024-02-05 DIAGNOSIS — E03.4 HYPOTHYROIDISM DUE TO ACQUIRED ATROPHY OF THYROID: ICD-10-CM

## 2024-02-05 DIAGNOSIS — I10 PRIMARY HYPERTENSION: ICD-10-CM

## 2024-02-05 DIAGNOSIS — E55.9 VITAMIN D DEFICIENCY: ICD-10-CM

## 2024-02-05 DIAGNOSIS — Z71.85 VACCINE COUNSELING: ICD-10-CM

## 2024-02-05 DIAGNOSIS — E11.9 TYPE 2 DIABETES MELLITUS WITHOUT COMPLICATION, WITHOUT LONG-TERM CURRENT USE OF INSULIN (HCC): ICD-10-CM

## 2024-02-05 DIAGNOSIS — E04.2 MULTINODULAR GOITER: Primary | ICD-10-CM

## 2024-02-05 DIAGNOSIS — M79.18 RIGHT BUTTOCK PAIN: ICD-10-CM

## 2024-02-05 DIAGNOSIS — C64.1 RENAL CELL CARCINOMA OF RIGHT KIDNEY (HCC): ICD-10-CM

## 2024-02-05 PROCEDURE — 3074F SYST BP LT 130 MM HG: CPT | Performed by: INTERNAL MEDICINE

## 2024-02-05 PROCEDURE — 3008F BODY MASS INDEX DOCD: CPT | Performed by: INTERNAL MEDICINE

## 2024-02-05 PROCEDURE — 99215 OFFICE O/P EST HI 40 MIN: CPT | Performed by: INTERNAL MEDICINE

## 2024-02-05 PROCEDURE — 3079F DIAST BP 80-89 MM HG: CPT | Performed by: INTERNAL MEDICINE

## 2024-02-05 RX ORDER — INSULIN DEGLUDEC 200 U/ML
50 INJECTION, SOLUTION SUBCUTANEOUS EVERY 12 HOURS
Qty: 36 EACH | Refills: 1 | Status: SHIPPED | OUTPATIENT
Start: 2024-02-05

## 2024-02-05 NOTE — PATIENT INSTRUCTIONS
ASSESSMENT/PLAN:     Encounter Diagnoses   Name Primary?    Multinodular goiter Stable.    Yes    Renal cell carcinoma of right kidney (HCC) SP Sx.        Type 2 diabetes mellitus without complication, without long-term current use of insulin (HCC) Higher.  Increase insulin to 38 units 2 times a day.  Insurance will be changing and not cover Levemir anymore.  Will cover Tresiba.  Call in 5 days. Careful with diet and excercise at least 30 minutes 3-4 times a week. Check sugars at different times on different dates. Careful with low sugars. Carry something with you and check sugar if can. Can carry aristides cracker, etc. Decrease carbohydrates. But also, careful with fruits and natural sugars.One serving a day and no more than 1 handful every day. Check feet  every AM and careful with sores and ulcers on feet bilaterally. Check eyes every year with dilated eye exam.  Check sugars.  2-hour postmeal should be less than 140s.  Pre-meal should be 's.  Both equally affected A1c.  Discussed importance of glycemic control to prevent complications of diabetes  -Discussed complications of diabetes include retinopathy, neuropathy, nephropathy and cardiovascular disease  -Discussed ABCs of DM  -Discussed importance of SBGM  -Discussed importance of low CHO diet, recommend 45gm per meal or 135gm per day  -UTD with optho  -Normotensive   Check blood.        Vaccine counseling Recc. PCV 20.        Vitamin D deficiency Stable.         Primary hypertension Stable. Careful with diet and excercise at least 30 minutes 3-4 times a week. Check blood pressures at different times on different days. Can purchase own blood pressure monitor. If not, check at local pharmacy. Bake foods more and grill occasionally. Avoid fried foods. No salt. Use other seasonings.         Hypothyroidism due to acquired atrophy of thyroid Stable.        Pain of right hip may be from osteoarthritis of the hip.  Hold imaging.  Follow-up with orthopedics.  Hold  medications.       Right buttock pain may be from back due to wearing the boot.  Hopefully boot will be out in 3 days.  Has a follow-up with orthopedics.  If still persistent will follow-up with orthopedics.  Hold physical therapy.  Hold imaging.      Pap smear for cervical cancer screening.  Follow-up with GYN after March 11, 2024 for Pap    No orders of the defined types were placed in this encounter.      Meds This Visit:  Requested Prescriptions     Signed Prescriptions Disp Refills    Insulin Degludec (TRESIBA FLEXTOUCH) 200 UNIT/ML Subcutaneous Solution Pen-injector 36 each 1     Sig: Inject 50 Units into the skin Q12H.       Imaging & Referrals:  ORTHOPEDIC - INTERNAL        Return to clinic after April 25, 2024 for full physical.

## 2024-02-05 NOTE — PROGRESS NOTES
HPI:    Patient ID: Rekha Moreno is a 53 year old female.    Chief Complaint   Patient presents with    Follow - Up     Patient states she is here for a 3 month follow up       Patient here for follow up of Diabetes.  Has been taking medications regularly.  Endocrinologist is going to be retiring.  Insurance will no longer cover Lantus there are options of Tresiba etc.  Checks sugars 1 times daily.  Fasting sugars average 140's.  No low sugars nor symptoms of low sugars were not able to check.  Watches diabetic diet, low salt.  Diabetic Flow sheet      2/5/2024     4:55 PM 2/5/2024     3:38 PM 2/5/2024     3:33 PM 8/18/2023     6:54 AM   DIABETIC FLOWSHEET ([facility])   BMI  41.16 kg/m2     Candesartan Cilexetil 8 mg BID OR    8 mg BID OR     8 mg BID OR     8 mg BID OR       Weight (enc vitals)  255 lb     BP (enc vitals)  123/85     Last Eye Exam    8/14/2023   Eye Doctor Name    Selina Cain MD   Eye Exam Location    [facility]   Eye Exam Retinopathy    No   PHQ2 Score   0        Medication marked as long-term     HISTORY OF DIABETES COMPLICATIONS: :  History of Retinopathy: No.   History of Neuropathy: No.   History of Nephropathy: Yes.      ASSOCIATED COMPLICATIONS:   HTN: Yes.   Hyperlipidemia: Yes.   Coronary Artery Disease:  CAD,  HF, PAD  Cerebrovascular Disease: No.      MEALS:  3 meals a day  Cooks at home    Hypertension  Patient is here for follow up of hypertension. BP at home: not check.   Has been compliant with medications.  Exercise level: not active and has been following low salt diet.  Weight has been down.  Wt Readings from Last 3 Encounters:   02/05/24 255 lb (115.7 kg)   08/08/23 263 lb (119.3 kg)   04/25/23 263 lb (119.3 kg)     BP Readings from Last 3 Encounters:   02/05/24 123/85   08/08/23 112/70   04/25/23 113/72     Labs:   Lab Results   Component Value Date/Time     (H) 12/17/2023 10:52 AM     (L) 12/17/2023 10:52 AM    K 4.3 12/17/2023 10:52 AM     12/17/2023 10:52  AM    CO2 26.0 12/17/2023 10:52 AM    CREATSERUM 1.05 (H) 12/17/2023 10:52 AM    CA 9.7 12/17/2023 10:52 AM    AST 36 08/14/2023 12:00 AM    ALT 49 08/14/2023 12:00 AM    TSH 1.810 08/14/2023 12:00 AM        Lab Results   Component Value Date/Time    CHOLEST 159 08/14/2023 12:00 AM    HDL 46 08/14/2023 12:00 AM    TRIG 119 08/14/2023 12:00 AM    LDL 70 04/26/2023 07:41 AM    NONHDLC 91 04/26/2023 07:41 AM            Wt Readings from Last 3 Encounters:   02/05/24 255 lb (115.7 kg)   08/08/23 263 lb (119.3 kg)   04/25/23 263 lb (119.3 kg)     BP Readings from Last 3 Encounters:   02/05/24 123/85   08/08/23 112/70   04/25/23 113/72     Labs:   Lab Results   Component Value Date/Time     (H) 12/17/2023 10:52 AM     (L) 12/17/2023 10:52 AM    K 4.3 12/17/2023 10:52 AM     12/17/2023 10:52 AM    CO2 26.0 12/17/2023 10:52 AM    CREATSERUM 1.05 (H) 12/17/2023 10:52 AM    CA 9.7 12/17/2023 10:52 AM    AST 36 08/14/2023 12:00 AM    ALT 49 08/14/2023 12:00 AM    TSH 1.810 08/14/2023 12:00 AM        Lab Results   Component Value Date/Time    CHOLEST 159 08/14/2023 12:00 AM    HDL 46 08/14/2023 12:00 AM    TRIG 119 08/14/2023 12:00 AM    LDL 70 04/26/2023 07:41 AM    NONHDLC 91 04/26/2023 07:41 AM          Had right foot fracture and was wearing a brace.  After wearing the brace repeated x-rays were done and showed was not healing so ended up going for R foot and screw. Ortho. Constant pain.  Now improved pain but now has to wear a boot again.'s activity has been very limited.  Since cannot bear weight for long peers of time.  Since wearing the boot and walking differently.  Has noticed right hip and right buttocks pain.  One-time right buttocks pain went all the way down the lateral side of the leg to the knee.  That has gotten better.    Hip Pain   The pain is present in the right hip. This is a new problem. The current episode started more than 1 month ago. There has been no history of extremity trauma (Has  been wearing a boot on the right side and boot is heavy.  Thus walking differently.  Feels a deep-seated right hip pain.  Similar to the left labral tear hip pain.). The problem occurs constantly. The problem has been gradually worsening. The quality of the pain is described as aching. The pain is moderate. Associated symptoms include an inability to bear weight, a limited range of motion and stiffness. Pertinent negatives include no fever, itching, joint locking, joint swelling, numbness or tingling. The symptoms are aggravated by activity and standing. She has tried acetaminophen for the symptoms. The treatment provided mild relief. Family history does not include gout or rheumatoid arthritis. Her past medical history is significant for diabetes and osteoarthritis. There is no history of gout or rheumatoid arthritis.         Review of Systems   Constitutional:  Positive for activity change. Negative for appetite change, chills, diaphoresis, fatigue, fever and unexpected weight change.   Respiratory:  Negative for apnea, cough, choking, chest tightness, shortness of breath, wheezing and stridor.    Cardiovascular:  Negative for chest pain, palpitations and leg swelling.   Gastrointestinal:  Negative for abdominal distention and abdominal pain.   Endocrine: Negative for cold intolerance, heat intolerance, polydipsia, polyphagia and polyuria.   Musculoskeletal:  Positive for stiffness. Negative for gout.   Skin:  Negative for itching.   Neurological:  Negative for dizziness, tingling, tremors, seizures, syncope, facial asymmetry, speech difficulty, weakness, light-headedness, numbness and headaches.   All other systems reviewed and are negative.        Current Outpatient Medications   Medication Sig Dispense Refill    Insulin Degludec (TRESIBA FLEXTOUCH) 200 UNIT/ML Subcutaneous Solution Pen-injector Inject 50 Units into the skin Q12H. 36 each 1    semaglutide (OZEMPIC, 2 MG/DOSE,) 8 MG/3ML Subcutaneous Solution  Pen-injector Inject 2 mg into the skin once a week. 9 mL 3    amLODIPine 2.5 MG Oral Tab Take 1 tablet (2.5 mg total) by mouth 2 (two) times daily. 180 tablet 3    atenolol 25 MG Oral Tab Take 1 tablet (25 mg total) by mouth nightly. 90 tablet 3    spironolactone 100 MG Oral Tab TAKE 1 TABLET EVERY MORNING AND ONE-HALF (1/2) TABLET IN THE EVENING 135 tablet 3    rosuvastatin 5 MG Oral Tab Take 1 tablet (5 mg total) by mouth nightly.      insulin detemir (LEVEMIR FLEXTOUCH) 100 UNIT/ML Subcutaneous Solution Pen-injector 32 units q12 hrs. 45 mL 3    Insulin Pen Needle (BD PEN NEEDLE LINA U/F) 32G X 4 MM Does not apply Misc Use as directed. 90 each 10    Candesartan Cilexetil 8 MG Oral Tab Take 1 tablet (8 mg total) by mouth 2 (two) times daily. 180 tablet 3    Continuous Blood Gluc Sensor (FREESTYLE MARCELLE 14 DAY SENSOR) Does not apply Misc USE AS DIRECTED 7 each 3    zolpidem (AMBIEN) 5 MG Oral Tab Take 1 tablet (5 mg total) by mouth nightly as needed for Sleep. 15 tablet 0    levothyroxine 100 MCG Oral Tab TAKE 1 TABLET BEFORE BREAKFAST 90 tablet 3    KRILL OIL OR Take 1 capsule by mouth daily.      Calcium Carbonate-Vitamin D (CALCIUM + D OR) Take by mouth.      famotidine 10 MG Oral Tab Take 1 tablet (10 mg total) by mouth 2 (two) times daily. 60 tablet 1    Multiple Vitamins-Minerals (MULTIVITAMIN OR) Take by mouth daily.        Olopatadine HCl 0.1 % Ophthalmic Solution Place 1 drop into both eyes 2 (two) times daily as needed.       Allergies:  Allergies   Allergen Reactions    Lisinopril Coughing    Metformin DIARRHEA       HISTORY:  Past Medical History:   Diagnosis Date    Calculus of kidney     Cancer (HCC)     kidney    Diabetes (HCC)     Disorder of thyroid     Epicondylitis, lateral 06/22/2018    [facility].     Essential hypertension     High blood pressure     PONV (postoperative nausea and vomiting)     difficulty arousing       Past Surgical History:   Procedure Laterality Date     APPENDECTOMY  2008    ARTHROSCOPY OF JOINT UNLISTED Left 1-4-16    Labral tear reapir, iliopsoas burscetomy. (hip)    ARTHROSCOPY OF JOINT UNLISTED Right     bone spur and torn cartilage shoulder    CARPAL TUNNEL RELEASE Right 2014    CARPAL TUNNEL RELEASE Left 01/2019    [facility].    CHOLECYSTECTOMY      COLONOSCOPY  05/2020    normal    CORRECT BUNION,OTHR METHODS Right     FOOT SURGERY Right     torn ligament repaired    NEPHRECTOMY Right 10/01/2018    Partial nephrectomy    OTHER Right 2005    arthroscopy bone spur removal     OTHER Left 01/2019    Status post Left lateral epicondylar release.  Done by [facility].    OTHER Right     toe ligament repair      Family History   Problem Relation Age of Onset    Diabetes Father     Arthritis Father     Hypertension Father     Heart Disease Father 75        aortic valve disease    Cancer Father         Skin cancer.     Pacemaker Father     Arthritis Mother     Cancer Mother         lung, skin and MDS    Hypertension Mother     Heart Disorder Mother 60        CAD S/P MI.     Cancer Brother         Hodgkin's    Hypertension Brother     Cancer Maternal Grandmother         Hepatoma.     Cancer Paternal Uncle         Stomach.       Social History:   Social History     Socioeconomic History    Marital status:     Number of children: 0   Occupational History    Occupation: Finance.    Tobacco Use    Smoking status: Never    Smokeless tobacco: Never   Vaping Use    Vaping Use: Never used   Substance and Sexual Activity    Alcohol use: Yes     Alcohol/week: 0.0 standard drinks of alcohol     Comment: occassionally     Drug use: No    Sexual activity: Yes     Partners: Male     Birth control/protection: Pill   Other Topics Concern    Caffeine Concern Yes     Comment: 5 8oz of coffee and soda daily    Exercise No   Social History Narrative    The patient does not use an assistive device..      The patient does live in a home with stairs.         PHYSICAL EXAM:   /85 (BP Location: Right arm, Patient Position: Sitting, Cuff Size: adult)   Pulse 84   Temp 98.3 °F (36.8 °C) (Oral)   Ht 5' 6\" (1.676 m)   Wt 255 lb (115.7 kg)   SpO2 99%   BMI 41.16 kg/m²   BP Readings from Last 3 Encounters:   02/05/24 123/85   08/08/23 112/70   04/25/23 113/72     Wt Readings from Last 3 Encounters:   02/05/24 255 lb (115.7 kg)   08/08/23 263 lb (119.3 kg)   04/25/23 263 lb (119.3 kg)       Physical Exam  Vitals and nursing note reviewed.   Constitutional:       General: She is not in acute distress.     Appearance: Normal appearance. She is well-developed. She is not ill-appearing, toxic-appearing or diaphoretic.      Interventions: She is not intubated.  Neck:      Thyroid: No thyroid mass or thyromegaly.      Trachea: Trachea and phonation normal.   Cardiovascular:      Rate and Rhythm: Normal rate and regular rhythm.      Pulses: Normal pulses. No decreased pulses.           Carotid pulses are 2+ on the right side and 2+ on the left side.       Radial pulses are 2+ on the right side and 2+ on the left side.        Dorsalis pedis pulses are 2+ on the right side and 2+ on the left side.        Posterior tibial pulses are 2+ on the right side and 2+ on the left side.      Heart sounds: Normal heart sounds, S1 normal and S2 normal.   Pulmonary:      Effort: Pulmonary effort is normal. No tachypnea, bradypnea, accessory muscle usage, prolonged expiration, respiratory distress or retractions. She is not intubated.      Breath sounds: Normal breath sounds and air entry. No stridor, decreased air movement or transmitted upper airway sounds. No decreased breath sounds, wheezing, rhonchi or rales.   Chest:      Chest wall: No tenderness.   Abdominal:      General: There is no distension.      Palpations: Abdomen is soft.      Tenderness: There is no abdominal tenderness.   Musculoskeletal:      Lumbar back: Tenderness present. No swelling, edema, deformity, signs of  trauma, lacerations, spasms or bony tenderness. Decreased range of motion. Positive right straight leg raise test. Negative left straight leg raise test. No scoliosis.      Right hip: Tenderness and bony tenderness present. No deformity, lacerations or crepitus. Decreased range of motion. Normal strength.      Left hip: No deformity, lacerations, tenderness, bony tenderness or crepitus. Normal range of motion. Normal strength.      Right lower leg: No edema.      Left lower leg: No edema.   Skin:     General: Skin is warm and dry.   Neurological:      Mental Status: She is alert and oriented to person, place, and time.      Motor: No tremor or seizure activity.   Psychiatric:         Behavior: Behavior normal. Behavior is cooperative.         Thought Content: Thought content normal.              ASSESSMENT/PLAN:     Encounter Diagnoses   Name Primary?    Multinodular goiter Stable.    Yes    Renal cell carcinoma of right kidney (HCC) SP Sx.        Type 2 diabetes mellitus without complication, without long-term current use of insulin (HCC) Higher.  Increase insulin to 38 units 2 times a day.  Insurance will be changing and not cover Levemir anymore.  Will cover Tresiba.  Call in 5 days. Careful with diet and excercise at least 30 minutes 3-4 times a week. Check sugars at different times on different dates. Careful with low sugars. Carry something with you and check sugar if can. Can carry aristides cracker, etc. Decrease carbohydrates. But also, careful with fruits and natural sugars.One serving a day and no more than 1 handful every day. Check feet  every AM and careful with sores and ulcers on feet bilaterally. Check eyes every year with dilated eye exam.  Check sugars.  2-hour postmeal should be less than 140s.  Pre-meal should be 's.  Both equally affected A1c.  Discussed importance of glycemic control to prevent complications of diabetes  -Discussed complications of diabetes include retinopathy, neuropathy,  nephropathy and cardiovascular disease  -Discussed ABCs of DM  -Discussed importance of SBGM  -Discussed importance of low CHO diet, recommend 45gm per meal or 135gm per day  -UTD with optho  -Normotensive   Check blood.        Vaccine counseling Recc. PCV 20.        Vitamin D deficiency Stable.         Primary hypertension Stable. Careful with diet and excercise at least 30 minutes 3-4 times a week. Check blood pressures at different times on different days. Can purchase own blood pressure monitor. If not, check at local pharmacy. Bake foods more and grill occasionally. Avoid fried foods. No salt. Use other seasonings.         Hypothyroidism due to acquired atrophy of thyroid Stable.        Pain of right hip may be from osteoarthritis of the hip.  Hold imaging.  Follow-up with orthopedics.  Hold medications.       Right buttock pain may be from back due to wearing the boot.  Hopefully boot will be out in 3 days.  Has a follow-up with orthopedics.  If still persistent will follow-up with orthopedics.  Hold physical therapy.  Hold imaging.      Pap smear for cervical cancer screening.  Follow-up with GYN after March 11, 2024 for Pap    No orders of the defined types were placed in this encounter.      Meds This Visit:  Requested Prescriptions     Signed Prescriptions Disp Refills    Insulin Degludec (TRESIBA FLEXTOUCH) 200 UNIT/ML Subcutaneous Solution Pen-injector 36 each 1     Sig: Inject 50 Units into the skin Q12H.       Imaging & Referrals:  ORTHOPEDIC - INTERNAL        Return to clinic after April 25, 2024 for full physical.

## 2024-02-09 ENCOUNTER — LAB ENCOUNTER (OUTPATIENT)
Dept: LAB | Facility: HOSPITAL | Age: 54
End: 2024-02-09
Attending: INTERNAL MEDICINE
Payer: COMMERCIAL

## 2024-02-09 DIAGNOSIS — E11.9 TYPE 2 DIABETES MELLITUS WITHOUT COMPLICATION, WITHOUT LONG-TERM CURRENT USE OF INSULIN (HCC): ICD-10-CM

## 2024-02-09 DIAGNOSIS — E03.4 HYPOTHYROIDISM DUE TO ACQUIRED ATROPHY OF THYROID: ICD-10-CM

## 2024-02-09 DIAGNOSIS — C64.1 RENAL CELL CARCINOMA OF RIGHT KIDNEY (HCC): ICD-10-CM

## 2024-02-09 DIAGNOSIS — D72.820 LYMPHOCYTOSIS: ICD-10-CM

## 2024-02-09 DIAGNOSIS — E04.2 MULTINODULAR GOITER: ICD-10-CM

## 2024-02-09 LAB
ALBUMIN SERPL-MCNC: 4 G/DL (ref 3.2–4.8)
ALBUMIN/GLOB SERPL: 1.3 {RATIO} (ref 1–2)
ALP LIVER SERPL-CCNC: 81 U/L
ALT SERPL-CCNC: 63 U/L
ANION GAP SERPL CALC-SCNC: 7 MMOL/L (ref 0–18)
AST SERPL-CCNC: 42 U/L (ref ?–34)
BASOPHILS # BLD AUTO: 0.05 X10(3) UL (ref 0–0.2)
BASOPHILS NFR BLD AUTO: 0.5 %
BILIRUB SERPL-MCNC: 0.6 MG/DL (ref 0.3–1.2)
BUN BLD-MCNC: 16 MG/DL (ref 9–23)
BUN/CREAT SERPL: 15.2 (ref 10–20)
CALCIUM BLD-MCNC: 9.7 MG/DL (ref 8.7–10.4)
CHLORIDE SERPL-SCNC: 105 MMOL/L (ref 98–112)
CHOLEST SERPL-MCNC: 104 MG/DL (ref ?–200)
CO2 SERPL-SCNC: 27 MMOL/L (ref 21–32)
CREAT BLD-MCNC: 1.05 MG/DL
DEPRECATED RDW RBC AUTO: 41.6 FL (ref 35.1–46.3)
EGFRCR SERPLBLD CKD-EPI 2021: 63 ML/MIN/1.73M2 (ref 60–?)
EOSINOPHIL # BLD AUTO: 0.09 X10(3) UL (ref 0–0.7)
EOSINOPHIL NFR BLD AUTO: 1 %
ERYTHROCYTE [DISTWIDTH] IN BLOOD BY AUTOMATED COUNT: 12.5 % (ref 11–15)
EST. AVERAGE GLUCOSE BLD GHB EST-MCNC: 183 MG/DL (ref 68–126)
FASTING PATIENT LIPID ANSWER: YES
FASTING STATUS PATIENT QL REPORTED: YES
GLOBULIN PLAS-MCNC: 3.1 G/DL (ref 2.8–4.4)
GLUCOSE BLD-MCNC: 163 MG/DL (ref 70–99)
HBA1C MFR BLD: 8 % (ref ?–5.7)
HCT VFR BLD AUTO: 46.9 %
HDLC SERPL-MCNC: 47 MG/DL (ref 40–59)
HGB BLD-MCNC: 15.6 G/DL
IMM GRANULOCYTES # BLD AUTO: 0.03 X10(3) UL (ref 0–1)
IMM GRANULOCYTES NFR BLD: 0.3 %
LDLC SERPL CALC-MCNC: 42 MG/DL (ref ?–100)
LYMPHOCYTES # BLD AUTO: 2.38 X10(3) UL (ref 1–4)
LYMPHOCYTES NFR BLD AUTO: 25.5 %
MCH RBC QN AUTO: 30.2 PG (ref 26–34)
MCHC RBC AUTO-ENTMCNC: 33.3 G/DL (ref 31–37)
MCV RBC AUTO: 90.7 FL
MONOCYTES # BLD AUTO: 0.79 X10(3) UL (ref 0.1–1)
MONOCYTES NFR BLD AUTO: 8.5 %
NEUTROPHILS # BLD AUTO: 5.99 X10 (3) UL (ref 1.5–7.7)
NEUTROPHILS # BLD AUTO: 5.99 X10(3) UL (ref 1.5–7.7)
NEUTROPHILS NFR BLD AUTO: 64.2 %
NONHDLC SERPL-MCNC: 57 MG/DL (ref ?–130)
OSMOLALITY SERPL CALC.SUM OF ELEC: 293 MOSM/KG (ref 275–295)
PLATELET # BLD AUTO: 241 10(3)UL (ref 150–450)
POTASSIUM SERPL-SCNC: 4.6 MMOL/L (ref 3.5–5.1)
PROT SERPL-MCNC: 7.1 G/DL (ref 5.7–8.2)
RBC # BLD AUTO: 5.17 X10(6)UL
SODIUM SERPL-SCNC: 139 MMOL/L (ref 136–145)
T4 FREE SERPL-MCNC: 1.1 NG/DL (ref 0.8–1.7)
TRIGL SERPL-MCNC: 74 MG/DL (ref 30–149)
TSI SER-ACNC: 1.71 MIU/ML (ref 0.55–4.78)
VIT D+METAB SERPL-MCNC: 39.3 NG/ML (ref 30–100)
VLDLC SERPL CALC-MCNC: 10 MG/DL (ref 0–30)
WBC # BLD AUTO: 9.3 X10(3) UL (ref 4–11)

## 2024-02-09 PROCEDURE — 83036 HEMOGLOBIN GLYCOSYLATED A1C: CPT

## 2024-02-09 PROCEDURE — 36415 COLL VENOUS BLD VENIPUNCTURE: CPT

## 2024-02-09 PROCEDURE — 86708 HEPATITIS A ANTIBODY: CPT | Performed by: INTERNAL MEDICINE

## 2024-02-09 PROCEDURE — 80503 PATH CLIN CONSLTJ SF 5-20: CPT | Performed by: INTERNAL MEDICINE

## 2024-02-09 PROCEDURE — 80053 COMPREHEN METABOLIC PANEL: CPT

## 2024-02-09 PROCEDURE — 85025 COMPLETE CBC W/AUTO DIFF WBC: CPT

## 2024-02-09 PROCEDURE — 84439 ASSAY OF FREE THYROXINE: CPT

## 2024-02-09 PROCEDURE — 86706 HEP B SURFACE ANTIBODY: CPT | Performed by: INTERNAL MEDICINE

## 2024-02-09 PROCEDURE — 86709 HEPATITIS A IGM ANTIBODY: CPT | Performed by: INTERNAL MEDICINE

## 2024-02-09 PROCEDURE — 86803 HEPATITIS C AB TEST: CPT | Performed by: INTERNAL MEDICINE

## 2024-02-09 PROCEDURE — 82306 VITAMIN D 25 HYDROXY: CPT

## 2024-02-09 PROCEDURE — 84443 ASSAY THYROID STIM HORMONE: CPT

## 2024-02-09 PROCEDURE — 82985 ASSAY OF GLYCATED PROTEIN: CPT

## 2024-02-09 PROCEDURE — 80061 LIPID PANEL: CPT

## 2024-02-09 PROCEDURE — 87340 HEPATITIS B SURFACE AG IA: CPT | Performed by: INTERNAL MEDICINE

## 2024-02-09 PROCEDURE — 86704 HEP B CORE ANTIBODY TOTAL: CPT | Performed by: INTERNAL MEDICINE

## 2024-02-10 LAB — FRUCTOSAMINE: 245 UMOL/L

## 2024-02-12 ENCOUNTER — HOSPITAL ENCOUNTER (OUTPATIENT)
Age: 54
Discharge: HOME | End: 2024-02-12
Payer: COMMERCIAL

## 2024-02-12 VITALS
RESPIRATION RATE: 14 BRPM | HEART RATE: 94 BPM | TEMPERATURE: 98 F | OXYGEN SATURATION: 97 % | DIASTOLIC BLOOD PRESSURE: 67 MMHG | SYSTOLIC BLOOD PRESSURE: 122 MMHG

## 2024-02-12 DIAGNOSIS — J01.40 ACUTE NON-RECURRENT PANSINUSITIS: Primary | ICD-10-CM

## 2024-02-12 PROCEDURE — 99213 OFFICE O/P EST LOW 20 MIN: CPT | Performed by: NURSE PRACTITIONER

## 2024-02-12 RX ORDER — DOXYCYCLINE HYCLATE 100 MG/1
100 CAPSULE ORAL 2 TIMES DAILY
Qty: 20 CAPSULE | Refills: 0 | Status: SHIPPED | OUTPATIENT
Start: 2024-02-12 | End: 2024-02-22

## 2024-02-12 RX ORDER — AMOXICILLIN AND CLAVULANATE POTASSIUM 875; 125 MG/1; MG/1
1 TABLET, FILM COATED ORAL 2 TIMES DAILY
Qty: 20 TABLET | Refills: 0 | Status: SHIPPED | OUTPATIENT
Start: 2024-02-12 | End: 2024-02-12

## 2024-02-12 NOTE — PROGRESS NOTES
CBC Normal (white blood cells and red blood cells and platelets),   CMP Normal (electrolytes, and liver functions), liver enzymes are elevated like prior.  Awaiting some more testing.  Would also recommend to do ultrasound of the liver.  But not as high as prior.  Also there is a slight decline in kidney function from prior.No motrin, ibuprofen, advil, alleve, naprosyn  with these medications.    Lipid (choilesterol) is good,   Thyroid is good.   Vitamin D level looks good.  Continue the same.  Hemoglobin A1c which is a 3-month average of sugars looks the same as prior.  Goal is 6.5 or less.Careful with diet and excercise at least 30 minutes 3-4 times a week. Check sugars at different times on different dates. Careful with low sugars. Carry something with you and check sugar if can. Can carry aristides cracker, etc. Decrease carbohydrates. But also, careful with fruits and natural sugars.One serving a day and no more than 1 handful every day. Check feet  every AM and careful with sores and ulcers on feet bilaterally. Check eyes every year with dilated eye exam.  Check sugars.  2-hour postmeal should be less than 140s.  Pre-meal should be 's.  Both equally affected A1c.  Recheck A1c in 3 months.  Orders written.

## 2024-02-12 NOTE — DISCHARGE INSTRUCTIONS
Steam, Flonase, Allegra  Monitor for any new or worsening symptoms if symptoms persist or worsen start Augmentin as discussed return for concerns

## 2024-02-12 NOTE — ED PROVIDER NOTES
Patient Seen in: [facility]      History   No chief complaint on file.    Stated Complaint: Sinus Problem    Subjective:   HPI    53-year-old female, with history of high blood pressure and diabetes presents with nasal congestion postnasal drip sinus pain and pressure for the last 3 days.  No fever.  No chest pain.  No difficulty breathing.  She does report postnasal drip which will cause a cough.    Objective:   Past Medical History:   Diagnosis Date    Calculus of kidney     Cancer (HCC)     kidney    Diabetes (HCC)     Disorder of thyroid     Epicondylitis, lateral 06/22/2018    [facility].     Essential hypertension     High blood pressure     PONV (postoperative nausea and vomiting)     difficulty arousing               Past Surgical History:   Procedure Laterality Date    APPENDECTOMY  2008    ARTHROSCOPY OF JOINT UNLISTED Left 1-4-16    Labral tear reapir, iliopsoas burscetomy. (hip)    ARTHROSCOPY OF JOINT UNLISTED Right     bone spur and torn cartilage shoulder    CARPAL TUNNEL RELEASE Right 2014    CARPAL TUNNEL RELEASE Left 01/2019    [facility].    CHOLECYSTECTOMY      COLONOSCOPY  05/2020    normal    CORRECT BUNION,OTHR METHODS Right     FOOT SURGERY Right     torn ligament repaired    NEPHRECTOMY Right 10/01/2018    Partial nephrectomy    OTHER Right 2005    arthroscopy bone spur removal     OTHER Left 01/2019    Status post Left lateral epicondylar release.  Done by [facility].    OTHER Right     toe ligament repair                Social History     Socioeconomic History    Marital status:     Number of children: 0   Occupational History    Occupation: Finance.    Tobacco Use    Smoking status: Never    Smokeless tobacco: Never   Vaping Use    Vaping Use: Never used   Substance and Sexual Activity    Alcohol use: Yes     Alcohol/week: 0.0 standard drinks of alcohol     Comment: occassionally     Drug use: No    Sexual activity: Yes      Partners: Male     Birth control/protection: Pill   Other Topics Concern    Caffeine Concern Yes     Comment: 5 8oz of coffee and soda daily    Exercise No   Social History Narrative    The patient does not use an assistive device..      The patient does live in a home with stairs.              Review of Systems    Positive for stated complaint: Sinus Problem  Other systems are as noted in HPI.  Constitutional and vital signs reviewed.      All other systems reviewed and negative except as noted above.    Physical Exam     ED Triage Vitals [02/12/24 1213]   /67   Pulse 94   Resp 14   Temp 97.8 °F (36.6 °C)   Temp src Temporal   SpO2 97 %   O2 Device None (Room air)       Current:/67   Pulse 94   Temp 97.8 °F (36.6 °C) (Temporal)   Resp 14   SpO2 97%         Physical Exam  Vitals and nursing note reviewed.   Constitutional:       Appearance: Normal appearance.   HENT:      Right Ear: Tympanic membrane normal.      Ears:      Comments: Left effusion     Nose: Congestion and rhinorrhea present.      Comments: Nares boggy mild frontal tenderness  Eyes:      Extraocular Movements: Extraocular movements intact.      Pupils: Pupils are equal, round, and reactive to light.   Cardiovascular:      Rate and Rhythm: Normal rate and regular rhythm.   Pulmonary:      Effort: Pulmonary effort is normal.      Breath sounds: Normal breath sounds.   Skin:     General: Skin is warm and dry.   Neurological:      Mental Status: She is alert.               ED Course   Labs Reviewed - No data to display                   MDM      Viral versus bacterial sinusitis considered URI as well as COVID  Negative COVID test x 2 at home declines additional testing here  Sinusitis symptoms x 3 days no fever suspect viral at this time discussed supportive care measures close PMD follow-up if symptoms persist fever Augmentin was prescribed with a watch and wait protocol.  Strict return precautions discussed all vital signs stable at  discharge                                   Medical Decision Making  Problems Addressed:  Acute non-recurrent pansinusitis: acute illness or injury with systemic symptoms    Risk  OTC drugs.  Prescription drug management.        Disposition and Plan     Clinical Impression:  1. Acute non-recurrent pansinusitis         Disposition:  Discharge  2/12/2024 12:33 pm    Follow-up:  Leonila Maradiaga MD  [address] [place] [id number]  [phone]    Schedule an appointment as soon as possible for a visit             Medications Prescribed:  Discharge Medication List as of 2/12/2024 12:33 PM        START taking these medications    Details   amoxicillin clavulanate 875-125 MG Oral Tab Take 1 tablet by mouth 2 (two) times daily for 10 days., Normal, Disp-20 tablet, R-0

## 2024-03-26 ENCOUNTER — HOSPITAL ENCOUNTER (OUTPATIENT)
Dept: ULTRASOUND IMAGING | Facility: HOSPITAL | Age: 54
Discharge: HOME | End: 2024-03-26
Attending: INTERNAL MEDICINE
Payer: COMMERCIAL

## 2024-03-26 DIAGNOSIS — R74.8 ELEVATED LIVER ENZYMES: ICD-10-CM

## 2024-03-26 PROCEDURE — 76981 USE PARENCHYMA: CPT | Performed by: INTERNAL MEDICINE

## 2024-03-26 PROCEDURE — 76705 ECHO EXAM OF ABDOMEN: CPT | Performed by: INTERNAL MEDICINE

## 2024-03-27 RX ORDER — FLASH GLUCOSE SENSOR
1 KIT MISCELLANEOUS
Qty: 7 EACH | Refills: 3 | Status: SHIPPED | OUTPATIENT
Start: 2024-03-27

## 2024-03-27 NOTE — TELEPHONE ENCOUNTER
REFILL PASSED PER [facility] PROTOCOLS    Requested Prescriptions   Pending Prescriptions Disp Refills    FREESTYLE MARCELLE 14 DAY SENSOR Does not apply Misc [Pharmacy Med Name: FREESTYLE MARCELLE SENSOR KIT 14 DAY] 7 each 3     Sig: USE AS DIRECTED       Diabetic Supplies Protocol Passed - 3/25/2024  8:11 PM        Passed - In person appointment or virtual visit in the past 12 mos or appointment in next 3 mos     Recent Out[facility] Visits              [facility]    Office Visit    7 months ago Type 2 diabetes mellitus without complication, without long-term current use of insulin [facility]    [facility] Visit    11 months ago Type 2 diabetes mellitus without complication, without long-term current use of insulin ([facility]    Office Visit    1 year ago Type 2 diabetes mellitus without complication, without long-term current use of insulin ([facility]    [facility] Visit    1 year ago [facility] liver enzymes    [facility]          Future Appointments         Provider Department Appt Notes    In 1 month Leonila Maradiaga MD [facility] last px 4-24-23                    Future Appointments         Provider Department Appt Notes    In 1 month [facility]-24-23          Recent Outpatient Visits              [facility]    Office Visit    7 months ago Type 2 diabetes mellitus without complication, without long-term current use  of insulin ([facility])    [facility] Leonila Osuna MD    Office Visit    11 months ago Type 2 diabetes mellitus without complication, without long-term current use of insulin ([facility]    [facility] Visit    1 year ago Type 2 diabetes mellitus without complication, without long-term current use of insulin ([facility])    [facility], Leonila Osuna MD    Office Visit    1 year ago Elevated liver enzymes    [facility]    [facility] Visit

## 2024-04-03 RX ORDER — LEVOTHYROXINE SODIUM 0.1 MG/1
TABLET ORAL
Qty: 90 TABLET | Refills: 3 | Status: SHIPPED | OUTPATIENT
Start: 2024-04-03

## 2024-04-03 NOTE — TELEPHONE ENCOUNTER
Refill passed per [facility] protocol.     Requested Prescriptions   Pending Prescriptions Disp Refills    LEVOTHYROXINE 100 MCG Oral Tab [Pharmacy Med Name: L-THYROXINE (SYNTHROID) TABS 100MCG] 90 tablet 3     Sig: TAKE 1 TABLET BEFORE BREAKFAST       Thyroid Medication Protocol Passed - 3/31/2024 11:12 PM        Passed - TSH in past 12 months        Passed - Last TSH value is normal     Lab Results   Component Value Date    TSH 1.712 02/09/2024    THYROIDFUNC 1.59 06/22/2016                 Passed - In person appointment or virtual visit in the past 12 mos or appointment in next 3 mos     Recent Outpatient Visits              1 month ago Multinodular goiter    [facility] Leonila Maradiaga MD    Office Visit    7 months ago Type 2 diabetes mellitus without complication, without long-term current use of insulin ([facility])    [facility] Leonila Maradiaga MD    Office Visit    11 months ago Type 2 diabetes mellitus without complication, without long-term current use of insulin ([facility])    [facility]    [facility] Visit    1 year ago Type 2 diabetes mellitus without complication, without long-term current use of insulin ([facility])    [facility]    Office Visit    1 year ago Elevated liver enzymes    [facility] [place] Basurto Maggie E., MD    Office Visit          Future Appointments         Provider Department Appt Notes    In 4 weeks Leonila Maradiaga MD [facility] last px 4-24-23

## 2024-05-02 ENCOUNTER — OFFICE VISIT (OUTPATIENT)
Dept: INTERNAL MEDICINE CLINIC | Facility: CLINIC | Age: 54
End: 2024-05-02

## 2024-05-02 VITALS
OXYGEN SATURATION: 95 % | SYSTOLIC BLOOD PRESSURE: 114 MMHG | HEIGHT: 66 IN | DIASTOLIC BLOOD PRESSURE: 71 MMHG | BODY MASS INDEX: 41.62 KG/M2 | HEART RATE: 63 BPM | TEMPERATURE: 98 F | WEIGHT: 259 LBS

## 2024-05-02 DIAGNOSIS — G43.001 MIGRAINE WITHOUT AURA AND WITH STATUS MIGRAINOSUS, NOT INTRACTABLE: ICD-10-CM

## 2024-05-02 DIAGNOSIS — R74.8 ELEVATED LIVER ENZYMES: ICD-10-CM

## 2024-05-02 DIAGNOSIS — E03.4 HYPOTHYROIDISM DUE TO ACQUIRED ATROPHY OF THYROID: ICD-10-CM

## 2024-05-02 DIAGNOSIS — E11.9 TYPE 2 DIABETES MELLITUS WITHOUT COMPLICATION, WITHOUT LONG-TERM CURRENT USE OF INSULIN (HCC): ICD-10-CM

## 2024-05-02 DIAGNOSIS — Z12.31 ENCOUNTER FOR SCREENING MAMMOGRAM FOR MALIGNANT NEOPLASM OF BREAST: ICD-10-CM

## 2024-05-02 DIAGNOSIS — N18.2 CHRONIC KIDNEY DISEASE (CKD) STAGE G2/A2, MILDLY DECREASED GLOMERULAR FILTRATION RATE (GFR) BETWEEN 60-89 ML/MIN/1.73 SQUARE METER AND ALBUMINURIA CREATININE RATIO BETWEEN 30-299 MG/G: ICD-10-CM

## 2024-05-02 DIAGNOSIS — G89.29 CHRONIC LOW BACK PAIN WITH SCIATICA, SCIATICA LATERALITY UNSPECIFIED, UNSPECIFIED BACK PAIN LATERALITY: ICD-10-CM

## 2024-05-02 DIAGNOSIS — Z00.00 ADULT GENERAL MEDICAL EXAM: Primary | ICD-10-CM

## 2024-05-02 DIAGNOSIS — Z71.85 VACCINE COUNSELING: ICD-10-CM

## 2024-05-02 DIAGNOSIS — M25.551 PAIN OF RIGHT HIP: ICD-10-CM

## 2024-05-02 DIAGNOSIS — I10 PRIMARY HYPERTENSION: ICD-10-CM

## 2024-05-02 DIAGNOSIS — H04.123 DRY EYES: ICD-10-CM

## 2024-05-02 DIAGNOSIS — K76.0 FATTY LIVER DISEASE, NONALCOHOLIC: ICD-10-CM

## 2024-05-02 DIAGNOSIS — E04.2 MULTINODULAR GOITER: ICD-10-CM

## 2024-05-02 DIAGNOSIS — M54.40 CHRONIC LOW BACK PAIN WITH SCIATICA, SCIATICA LATERALITY UNSPECIFIED, UNSPECIFIED BACK PAIN LATERALITY: ICD-10-CM

## 2024-05-02 DIAGNOSIS — E55.9 VITAMIN D DEFICIENCY: ICD-10-CM

## 2024-05-02 DIAGNOSIS — Z12.11 COLON CANCER SCREENING: ICD-10-CM

## 2024-05-02 DIAGNOSIS — K59.00 CONSTIPATION, UNSPECIFIED CONSTIPATION TYPE: ICD-10-CM

## 2024-05-02 DIAGNOSIS — G57.61 MORTON NEUROMA, RIGHT: ICD-10-CM

## 2024-05-02 LAB
CREAT UR-SCNC: 211.6 MG/DL
MICROALBUMIN UR-MCNC: 1.3 MG/DL
MICROALBUMIN/CREAT 24H UR-RTO: 6.1 UG/MG (ref ?–30)

## 2024-05-02 PROCEDURE — 3008F BODY MASS INDEX DOCD: CPT | Performed by: INTERNAL MEDICINE

## 2024-05-02 PROCEDURE — 90677 PCV20 VACCINE IM: CPT | Performed by: INTERNAL MEDICINE

## 2024-05-02 PROCEDURE — 99396 PREV VISIT EST AGE 40-64: CPT | Performed by: INTERNAL MEDICINE

## 2024-05-02 PROCEDURE — 3074F SYST BP LT 130 MM HG: CPT | Performed by: INTERNAL MEDICINE

## 2024-05-02 PROCEDURE — 3061F NEG MICROALBUMINURIA REV: CPT | Performed by: INTERNAL MEDICINE

## 2024-05-02 PROCEDURE — 3078F DIAST BP <80 MM HG: CPT | Performed by: INTERNAL MEDICINE

## 2024-05-02 PROCEDURE — 3052F HG A1C>EQUAL 8.0%<EQUAL 9.0%: CPT | Performed by: INTERNAL MEDICINE

## 2024-05-02 PROCEDURE — 90471 IMMUNIZATION ADMIN: CPT | Performed by: INTERNAL MEDICINE

## 2024-05-02 PROCEDURE — 99214 OFFICE O/P EST MOD 30 MIN: CPT | Performed by: INTERNAL MEDICINE

## 2024-05-02 RX ORDER — BLOOD-GLUCOSE SENSOR
1 EACH MISCELLANEOUS
Qty: 24 EACH | Refills: 3 | Status: SHIPPED | OUTPATIENT
Start: 2024-05-02

## 2024-05-02 RX ORDER — FLASH GLUCOSE SENSOR
KIT MISCELLANEOUS
Qty: 24 EACH | Refills: 0 | Status: SHIPPED | OUTPATIENT
Start: 2024-05-02

## 2024-05-02 RX ORDER — INSULIN DEGLUDEC 200 U/ML
INJECTION, SOLUTION SUBCUTANEOUS
Qty: 36 EACH | Refills: 1 | Status: SHIPPED | OUTPATIENT
Start: 2024-05-02

## 2024-05-02 RX ORDER — FLASH GLUCOSE SENSOR
KIT MISCELLANEOUS
Qty: 90 EACH | Refills: 1 | Status: SHIPPED | OUTPATIENT
Start: 2024-05-02

## 2024-05-02 NOTE — PATIENT INSTRUCTIONS
ASSESSMENT/PLAN:     Encounter Diagnoses   Name Primary?    Adult general medical exam Check urine.    Yes    Encounter for screening mammogram for malignant neoplasm of breast Normal mammogram. Continue self breast exam every month.  Get order from gyne. per pt. thru [facility] for after 7-28-24.        Chronic low back pain with sciatica, sciatica laterality unspecified, unspecified back pain laterality FU pain clinic for SI joint possibly. Hold meds.  Cannot do NSAIDs.  Does not want to be groggy.  Cannot tolerate narcotics due to nausea.  Physical therapy with no benefit.  Cortisone into hip mild help.  Awaiting follow-up with pain service.  Can try capsaicin or Tiger balm to area with gloves on up to 3 times a day.       Chronic kidney disease (CKD) stage G2/A2, mildly decreased glomerular filtration rate (GFR) between 60-89 mL/min/1.73 square meter and albuminuria creatinine ratio between  mg/g No motrin, ibuprofen, advil, alleve, naprosyn  with these medications.         Dry eyes Stable.        Elevated liver enzymes Stable.  Liver ultrasound with elastography noted.  Weight loss will help.  Hard to get back into daily activity due to back pain.       Constipation, unspecified constipation type  Improved.         Colon cancer screening Up to date.        Fatty liver disease, nonalcoholic Improved. Weight loss.        Primary hypertension Stable. Careful with diet and excercise at least 30 minutes 3-4 times a week. Check blood pressures at different times on different days. Can purchase own blood pressure monitor. If not, check at local pharmacy. Bake foods more and grill occasionally. Avoid fried foods. No salt. Use other seasonings.          Hypothyroidism due to acquired atrophy of thyroid Stable.        Multinodular goiter thyroid ultrasound without changes.  Will do be due for recheck June 1, 2024.       Jeffery neuroma, right       Migraine without aura and with status migrainosus, not intractable Stable.         Vaccine counseling PCV 20 today.        Vitamin D deficiency Stable.        Type 2 diabetes mellitus without complication, without long-term current use of insulin (HCC) some in the mornings and higher in the mid afternoons.  Adjust Tresiba.  Call in 1 to 2 weeks with sugars.  Check blood May 9, 2024.  Check urine today.Careful with diet and excercise at least 30 minutes 3-4 times a week. Check sugars at different times on different dates. Careful with low sugars. Carry something with you and check sugar if can. Can carry aristides cracker, etc. Decrease carbohydrates. But also, careful with fruits and natural sugars.One serving a day and no more than 1 handful every day. Check feet  every AM and careful with sores and ulcers on feet bilaterally. Check eyes every year with dilated eye exam.  Check sugars.  2-hour postmeal should be less than 140s.  Pre-meal should be 's.  Both equally affected A1c.  Discussed importance of glycemic control to prevent complications of diabetes  -Discussed complications of diabetes include retinopathy, neuropathy, nephropathy and cardiovascular disease  -Discussed ABCs of DM  -Discussed importance of SBGM  -Discussed importance of low CHO diet, recommend 45gm per meal or 135gm per day  -Normal foot exam  -UTD with optho  -Normotensive        Pain of right hip due to labral tear.  Has tried physical therapy and cortisone injections.  May need surgery.  But difficult to do at present time.        Orders Placed This Encounter   Procedures    Microalb/Creat Ratio, Random Urine    URINALYSIS, AUTO, W/O SCOPE    Prevnar 20 (PCV20) [30260]       Meds This Visit:  Requested Prescriptions     Signed Prescriptions Disp Refills    insulin degludec (TRESIBA FLEXTOUCH) 200 UNIT/ML Subcutaneous Solution Pen-injector 36 each 1     Si units qam and 33 units qpm.    Continuous Glucose Sensor (FREESTYLE MARCELLE 3 SENSOR) Does not apply Misc 24 each 3     Si each every 14 (fourteen)  days.    Continuous Glucose  (FREESTYLE MARCELLE READER) Does not apply Device 90 each 1     Sig: Checks 3 times a day.    Continuous Glucose Sensor (FREESTYLE MARCELLE SENSOR SYSTEM) Does not apply Misc 24 each 0     Sig: Checks 3 times  a day.       Imaging & Referrals:  PCV20 VACCINE FOR INTRAMUSCULAR USE        RTC 3 months for FU DM.

## 2024-05-22 RX ORDER — CANDESARTAN 8 MG/1
8 TABLET ORAL 2 TIMES DAILY
Qty: 180 TABLET | Refills: 3 | Status: SHIPPED | OUTPATIENT
Start: 2024-05-22

## 2024-05-22 NOTE — TELEPHONE ENCOUNTER
Refill passed per [facility] protocol.    Please review pended refill request as unable to refill due to high/very high drug interaction warning copied here:   High  Drug-Drug: spironolactone and Candesartan CilexetilThe risk of hyperkalemia may be increased when potassium-sparing diuretics are co-administered with angiotensin II receptor antagonists.      Requested Prescriptions   Pending Prescriptions Disp Refills    CANDESARTAN CILEXETIL 8 MG Oral Tab [Pharmacy Med Name: CANDESARTAN TABS 8MG] 180 tablet 3     Sig: TAKE 1 TABLET TWICE A DAY       Hypertension Medications Protocol Passed - 5/20/2024 12:21 AM        Passed - CMP or BMP in past 12 months        Passed - Last BP reading less than 140/90     BP Readings from Last 1 Encounters:   05/02/24 114/71               Passed - In person appointment or virtual visit in the past 12 mos or appointment in next 3 mos     Recent Outpatient Visits              2 weeks ago [facility]    Office Visit    3 months ago [facility]inodular goiter    [facility]    Office Visit    9 months ago Type 2 diabetes mellitus without complication, without long-term current use of insulin ([facility])    [facility] Leonila Osuna MD    Office Visit    1 year ago Type 2 diabetes mellitus without complication, without long-term current use of insulin ([facility]    Office Visit    1 year ago Type 2 diabetes mellitus without complication, without long-term current use of insulin ([facility])    [facility] [place]    [facility] Visit          Future Appointments         Provider Department Appt Notes    In 2 weeks [facility] US [facility] Annual  follow up on thyroid    In 2 months Leonila Maradiaga MD [place] 3 month  fu                    Passed - EGFRCR or GFRNAA > 50     GFR Evaluation  EGFRCR: 63 , resulted on 2/9/2024             Future Appointments         Provider Department Appt Notes    In 2 weeks [address] [facility] Annual follow up on thyroid    In 2 months Leonila Maradiaga MD [place], [place], [place] 3 month  fu          Recent Outpatient Visits              2 weeks ago [facility] general medical exam    [facility]    [facility] Visit    3 months ago [facility]od[facility]    Office Visit    9 months ago Type 2 diabetes mellitus without complication, without long-term current use of insulin ([facility])    [facility] Leonila Osuna MD    Office Visit    1 year ago Type 2 diabetes mellitus without complication, without long-term current use of insulin ([facility])    [facility]    Office Visit    1 year ago Type 2 diabetes mellitus without complication, without long-term current use of insulin ([facility])    [facility]    Office Visit

## 2024-06-07 ENCOUNTER — HOSPITAL ENCOUNTER (OUTPATIENT)
Dept: ULTRASOUND IMAGING | Facility: HOSPITAL | Age: 54
Discharge: HOME | End: 2024-06-07
Attending: INTERNAL MEDICINE
Payer: COMMERCIAL

## 2024-06-07 DIAGNOSIS — E04.2 MULTINODULAR GOITER: ICD-10-CM

## 2024-06-07 PROCEDURE — 76536 US EXAM OF HEAD AND NECK: CPT | Performed by: INTERNAL MEDICINE

## 2024-07-03 ENCOUNTER — TELEPHONE (OUTPATIENT)
Dept: INTERNAL MEDICINE CLINIC | Facility: CLINIC | Age: 54
End: 2024-07-03

## 2024-07-03 NOTE — TELEPHONE ENCOUNTER
Patient asking for a pre-op clearance visit with Alyson or whoever can do it, she said.  Surgery is aug 12th.  However,  surgeon said he needs the clearance by August 9th at 12 noon a the latest.    Patient prefers to have the pre-op clearance the week of  August 9th or the week before that    And she can cancel the August 9th appointment with Dr Jeff for followup if Dr Maradiaga decides she does not need it.    Call patient at:[phone]

## 2024-07-05 NOTE — TELEPHONE ENCOUNTER
Okay to keep that appointment.  Will talk to the surgeon about sending it after finishing also probably around 1230.

## 2024-07-05 NOTE — TELEPHONE ENCOUNTER
Patient contacted and made aware of Dr. Maradiaga's recommendation. She will call surgeon's office to convey the plan -- if surgeon insists on pre-op visit being at a sooner date, she will call back to schedule with another provider for pre-op visit. Patient verbalized understanding. No further questions or concerns at this time.    Future Appointments   Date Time Provider Department Center   8/9/2024 12:00 PM [facility] [place]

## 2024-07-09 ENCOUNTER — PATIENT MESSAGE (OUTPATIENT)
Dept: INTERNAL MEDICINE CLINIC | Facility: CLINIC | Age: 54
End: 2024-07-09

## 2024-07-10 NOTE — TELEPHONE ENCOUNTER
From: Rekha Moreno  To: Leonila Maradiaga  Sent: 7/9/2024 9:44 AM CDT  Subject: Presurgery Visit (Hip Surgery 8-12)    Dr Maradiaga-  I exchanged messages with your team last week re: wanting to move up the 8/9 followup appt i have with you so that it could serve as presurgery visit as well as followup. I understood from your team that there are no earlier appts with you and that we should keep the 8/9 appt and you would call the surgeon's office w clearance after it's completed.     Problem is the surgeon's office tells me they close at noon on Friday's so a call from you following the 8/9 12:00 appt would not be received in time to process the clearance for my surgery the following Monday 8/12 .    It seems to me best solution would be for me to schedule the presurgery visit with someone you recommend so it can be done earlier that week or week before. I'd assume I'd also keep the 8/9 follow-up already scheduled.     Who would you recommend I seek an appt with to schedule the pre-surgery visit?    Thanks  Rekha Moreno  12/11/1970  [phone]

## 2024-07-23 ENCOUNTER — LAB ENCOUNTER (OUTPATIENT)
Dept: LAB | Facility: HOSPITAL | Age: 54
End: 2024-07-23
Attending: UROLOGY
Payer: COMMERCIAL

## 2024-07-23 ENCOUNTER — HOSPITAL ENCOUNTER (OUTPATIENT)
Dept: MRI IMAGING | Facility: HOSPITAL | Age: 54
Discharge: HOME | End: 2024-07-23
Attending: UROLOGY
Payer: COMMERCIAL

## 2024-07-23 DIAGNOSIS — Z85.528 HX OF MALIGNANT NEOPLASM OF KIDNEY: ICD-10-CM

## 2024-07-23 DIAGNOSIS — N18.2 CHRONIC KIDNEY DISEASE (CKD) STAGE G2/A2, MILDLY DECREASED GLOMERULAR FILTRATION RATE (GFR) BETWEEN 60-89 ML/MIN/1.73 SQUARE METER AND ALBUMINURIA CREATININE RATIO BETWEEN 30-299 MG/G: ICD-10-CM

## 2024-07-23 LAB
CREAT BLD-MCNC: 0.97 MG/DL
EGFRCR SERPLBLD CKD-EPI 2021: 69 ML/MIN/1.73M2 (ref 60–?)

## 2024-07-23 PROCEDURE — 82565 ASSAY OF CREATININE: CPT

## 2024-07-23 PROCEDURE — 36415 COLL VENOUS BLD VENIPUNCTURE: CPT

## 2024-07-23 PROCEDURE — A9575 INJ GADOTERATE MEGLUMI 0.1ML: HCPCS | Performed by: UROLOGY

## 2024-07-23 PROCEDURE — 74183 MRI ABD W/O CNTR FLWD CNTR: CPT | Performed by: UROLOGY

## 2024-07-23 RX ORDER — GADOTERATE MEGLUMINE 376.9 MG/ML
20 INJECTION INTRAVENOUS
Status: COMPLETED | OUTPATIENT
Start: 2024-07-23 | End: 2024-07-23

## 2024-07-23 RX ADMIN — GADOTERATE MEGLUMINE 20 ML: 376.9 INJECTION INTRAVENOUS at 08:27:00

## 2024-08-08 ENCOUNTER — TELEPHONE (OUTPATIENT)
Dept: INTERNAL MEDICINE CLINIC | Facility: CLINIC | Age: 54
End: 2024-08-08

## 2024-08-08 ENCOUNTER — OFFICE VISIT (OUTPATIENT)
Dept: INTERNAL MEDICINE CLINIC | Facility: CLINIC | Age: 54
End: 2024-08-08

## 2024-08-08 ENCOUNTER — PATIENT MESSAGE (OUTPATIENT)
Dept: INTERNAL MEDICINE CLINIC | Facility: CLINIC | Age: 54
End: 2024-08-08

## 2024-08-08 VITALS
HEART RATE: 56 BPM | DIASTOLIC BLOOD PRESSURE: 70 MMHG | WEIGHT: 264 LBS | BODY MASS INDEX: 42.43 KG/M2 | SYSTOLIC BLOOD PRESSURE: 110 MMHG | OXYGEN SATURATION: 100 % | HEIGHT: 66 IN | TEMPERATURE: 98 F

## 2024-08-08 DIAGNOSIS — K76.0 FATTY LIVER DISEASE, NONALCOHOLIC: ICD-10-CM

## 2024-08-08 DIAGNOSIS — M25.552 PAIN OF LEFT HIP JOINT: ICD-10-CM

## 2024-08-08 DIAGNOSIS — E04.2 MULTINODULAR GOITER: ICD-10-CM

## 2024-08-08 DIAGNOSIS — I10 PRIMARY HYPERTENSION: ICD-10-CM

## 2024-08-08 DIAGNOSIS — C64.1 RENAL CELL CARCINOMA OF RIGHT KIDNEY (HCC): ICD-10-CM

## 2024-08-08 DIAGNOSIS — E11.9 TYPE 2 DIABETES MELLITUS WITHOUT COMPLICATION, WITHOUT LONG-TERM CURRENT USE OF INSULIN (HCC): ICD-10-CM

## 2024-08-08 DIAGNOSIS — K86.2 PANCREATIC CYST (HCC): ICD-10-CM

## 2024-08-08 DIAGNOSIS — Z12.31 ENCOUNTER FOR SCREENING MAMMOGRAM FOR MALIGNANT NEOPLASM OF BREAST: ICD-10-CM

## 2024-08-08 DIAGNOSIS — E03.4 HYPOTHYROIDISM DUE TO ACQUIRED ATROPHY OF THYROID: ICD-10-CM

## 2024-08-08 DIAGNOSIS — N18.2 CHRONIC KIDNEY DISEASE (CKD) STAGE G2/A2, MILDLY DECREASED GLOMERULAR FILTRATION RATE (GFR) BETWEEN 60-89 ML/MIN/1.73 SQUARE METER AND ALBUMINURIA CREATININE RATIO BETWEEN 30-299 MG/G: Primary | ICD-10-CM

## 2024-08-08 DIAGNOSIS — R74.8 ELEVATED LIVER ENZYMES: ICD-10-CM

## 2024-08-08 PROCEDURE — 3008F BODY MASS INDEX DOCD: CPT | Performed by: INTERNAL MEDICINE

## 2024-08-08 PROCEDURE — 3078F DIAST BP <80 MM HG: CPT | Performed by: INTERNAL MEDICINE

## 2024-08-08 PROCEDURE — 3074F SYST BP LT 130 MM HG: CPT | Performed by: INTERNAL MEDICINE

## 2024-08-08 PROCEDURE — 99215 OFFICE O/P EST HI 40 MIN: CPT | Performed by: INTERNAL MEDICINE

## 2024-08-08 RX ORDER — GABAPENTIN 100 MG/1
100 CAPSULE ORAL NIGHTLY
COMMUNITY
Start: 2024-06-04

## 2024-08-08 RX ORDER — PEN NEEDLE, DIABETIC 32GX 5/32"
1 NEEDLE, DISPOSABLE MISCELLANEOUS AS DIRECTED
Qty: 90 EACH | Refills: 3 | Status: SHIPPED | OUTPATIENT
Start: 2024-08-08

## 2024-08-08 NOTE — TELEPHONE ENCOUNTER
Refill passed per [facility] protocol.  Requested Prescriptions   Pending Prescriptions Disp Refills    BD PEN NEEDLE LINA 2ND GEN 32G X 4 MM Does not apply Misc [Pharmacy Med Name: BD PEN VANESSA LINA 2GEN 4MM 90'S 32G5/32] 90 each 10     Sig: USE AS DIRECTED       Diabetic Supplies Protocol Passed - 8/4/2024  9:09 PM        Passed - In person appointment or virtual visit in the past 12 mos or appointment in next 3 mos     Recent Outpatient Visits              3 months ago Adult general medical exam    [facility]    [facility]    Office Visit    1 year ago Type 2 diabetes mellitus without complication, without long-term current use of insulin ([facility])    [facility]    Office Visit    1 year ago Type 2 diabetes mellitus without complication, without long-term current use of insulin ([facility])    [facility]    Office Visit    1 year ago Type 2 diabetes mellitus without complication, without long-term current use of insulin ([facility])    [facility]    [facility] Visit          Future Appointments         Provider Department Appt Notes    Today Leonila Maradiaga MD [facility] pre op ok per dr castillo    Tomorrow [facility]                       Recent Outpatient Visits              3 months ago Adult general medical exam    [facility]    Office Visit    6 months ago [facility] Leonila Osuna MD    Office Visit    1 year ago  Type 2 diabetes mellitus without complication, without long-term current use of insulin ([facility])    [facility]    Office Visit    1 year ago Type 2 diabetes mellitus without complication, without long-term current use of insulin ([facility])    [facility]    Office Visit    1 year ago Type 2 diabetes mellitus without complication, without long-term current use of insulin ([facility])    [facility]    [facility] Visit          Future Appointments         Provider Department Appt Notes    Today Leonila Maradiaga MD [facility] pre op ok per dr castillo    Tomorrow Leonila Maradiaga MD [facility] [place] Bas[place] 3 month  fu

## 2024-08-08 NOTE — PROGRESS NOTES
HPI:    Patient ID: Rekha Moreno is a 53 year old female.    Rekha Moreno is a 53 year old female who presents for a complete physical exam.   HPI:     Chief Complaint   Patient presents with    Pre-Op Exam     Patient states she is here for Pre-Op Examination.       Rekha Moreno is a 53 year old female who presents for a pre-operative physical exam. Patient is to have R torn labrum rSx. , to be done by Dr. Kruger at surgery center on 8-12-24.    Pt has had previous anesthesia:  Yes.  Previous complications:  No except N.  Fax: [phone] Chrissy Cortes. Phone: [phone].   Chief Complaint   Patient presents with    Pre-Op Exam     Patient states she is here for Pre-Op Examination.      Current Outpatient Medications   Medication Sig Dispense Refill    Insulin Pen Needle (BD PEN NEEDLE LINA 2ND GEN) 32G X 4 MM Does not apply Misc Inject 1 Needle into the skin As Directed. 90 each 3    gabapentin 100 MG Oral Cap Take 1 capsule (100 mg total) by mouth nightly.      Candesartan Cilexetil 8 MG Oral Tab Take 1 tablet (8 mg total) by mouth 2 (two) times daily. 180 tablet 3    insulin degludec (TRESIBA FLEXTOUCH) 200 UNIT/ML Subcutaneous Solution Pen-injector 40 units qam and 33 units qpm. 36 each 1    Continuous Glucose Sensor (FREESTYLE MARCELLE 3 SENSOR) Does not apply Misc 1 each every 14 (fourteen) days. 24 each 3    levothyroxine 100 MCG Oral Tab TAKE 1 TABLET BEFORE BREAKFAST 90 tablet 3    semaglutide (OZEMPIC, 2 MG/DOSE,) 8 MG/3ML Subcutaneous Solution Pen-injector Inject 2 mg into the skin once a week. 9 mL 3    amLODIPine 2.5 MG Oral Tab Take 1 tablet (2.5 mg total) by mouth 2 (two) times daily. 180 tablet 3    atenolol 25 MG Oral Tab Take 1 tablet (25 mg total) by mouth nightly. 90 tablet 3    spironolactone 100 MG Oral Tab TAKE 1 TABLET EVERY MORNING AND ONE-HALF (1/2) TABLET IN THE EVENING 135 tablet 3    rosuvastatin 5 MG Oral Tab Take 1 tablet (5 mg total) by mouth nightly.       zolpidem (AMBIEN) 5 MG Oral Tab Take 1 tablet (5 mg total) by mouth nightly as needed for Sleep. 15 tablet 0    KRILL OIL OR Take 1 capsule by mouth daily.      Calcium Carbonate-Vitamin D (CALCIUM + D OR) Take by mouth.      famotidine 10 MG Oral Tab Take 1 tablet (10 mg total) by mouth 2 (two) times daily. 60 tablet 1    Multiple Vitamins-Minerals (MULTIVITAMIN OR) Take by mouth daily.        Olopatadine HCl 0.1 % Ophthalmic Solution Place 1 drop into both eyes 2 (two) times daily as needed.        Allergies:   Allergies   Allergen Reactions    Lisinopril Coughing    Metformin DIARRHEA      Past Medical History:    Calculus of kidney    Cancer (HCC)    kidney    Diabetes (HCC)    Disorder of thyroid    Epicondylitis, lateral    B/L. [facility].     Essential hypertension    High blood pressure    PONV (postoperative nausea and vomiting)    difficulty arousing       Past Surgical History:   Procedure Laterality Date    Appendectomy  2008    Arthroscopy of joint unlisted Left 1-4-16    Labral tear reapir, iliopsoas burscetomy. (hip)    Arthroscopy of joint unlisted Right     bone spur and torn cartilage shoulder    Carpal tunnel release Right 2014    Carpal tunnel release Left 01/2019    [facility].    Cholecystectomy      Colonoscopy  05/2020    normal    Correct bunion,othr methods Right     Foot surgery Right     torn ligament repaired    Nephrectomy Right 10/01/2018    Partial nephrectomy    Other Right 2005    arthroscopy bone spur removal     Other Left 01/2019    Status post Left lateral epicondylar release.  Done by [facility].    Other Right     toe ligament repair      Family History   Problem Relation Age of Onset    Diabetes Father     Arthritis Father     Hypertension Father     Heart Disease Father 75        aortic valve disease    Cancer Father         Skin cancer.     Pacemaker Father     Arthritis Mother     Cancer Mother         lung, skin and MDS    Hypertension  Mother     Heart Disorder Mother 60        CAD S/P MI.     Cancer Brother         Hodgkin's    Hypertension Brother     Cancer Maternal Grandmother         Hepatoma.     Cancer Paternal Uncle         Stomach.       Social History     Socioeconomic History    Marital status:     Number of children: 0   Occupational History    Occupation: Finance.    Tobacco Use    Smoking status: Never    Smokeless tobacco: Never   Vaping Use    Vaping status: Never Used   Substance and Sexual Activity    Alcohol use: Yes     Alcohol/week: 0.0 standard drinks of alcohol     Comment: occassionally     Drug use: No    Sexual activity: Yes     Partners: Male     Birth control/protection: Pill   Other Topics Concern    Caffeine Concern Yes     Comment: 5 8oz of coffee and soda daily    Exercise No         EXAM:   /70 (BP Location: Right arm, Patient Position: Sitting, Cuff Size: large)   Pulse 56   Temp 98.3 °F (36.8 °C) (Oral)   Ht 5' 6\" (1.676 m)   Wt 264 lb (119.7 kg)   SpO2 100%   BMI 42.61 kg/m²  Body mass index is 42.61 kg/m².    Recent Labs:   Results for orders placed or performed in visit on 07/23/24   Creatinine, Serum   Result Value Ref Range    Creatinine 0.97 0.55 - 1.02 mg/dL    eGFR-Cr 69 >=60 mL/min/1.73m2       ASSESSMENT AND PLAN:   Rekha Moreno is a 53 year old female who presents for a pre-operative physical exam.   Perioperative Medical Risk Evaluation    1. Cardiac Risk    EKG shows: Done by orthopedics DIANNE.  But unable to view actual tracing.  Only can see report.  Patient says she has a copy at home will send to me.    Per ACC/ AHA guidelines, this patient may proceed to surgery without further risk stratification.    2. Pulmonary risk      3. Heme  No history of bleeding disorder, no evidence of significant anemia  CBC ordered    4. Renal  No hx of renal disease  CMP ordered    Pt may proceed to surgery without recommendation for any further evaluation / risk stratification.  Pending  visualization of EKG.  Medical conditions are optimized for surgery and perioperative medication recommendations are outlined above.     Assessment:  Encounter Diagnoses   Name Primary?    Chronic kidney disease (CKD) stage G2/A2, mildly decreased glomerular filtration rate (GFR) between 60-89 mL/min/1.73 square meter and albuminuria creatinine ratio between  mg/g Yes    Elevated liver enzymes     Fatty liver disease, nonalcoholic     Primary hypertension     Hypothyroidism due to acquired atrophy of thyroid     Multinodular goiter     Pain of left hip joint     Pancreatic cyst (HCC)     Renal cell carcinoma of right kidney (HCC)     Type 2 diabetes mellitus without complication, without long-term current use of insulin (HCC)     Encounter for screening mammogram for malignant neoplasm of breast        Plan   Orders:  No orders of the defined types were placed in this encounter.    Medications filled today:  Requested Prescriptions      No prescriptions requested or ordered in this encounter     Radiology orders:  OPHTHALMOLOGY - INTERNAL    No follow-ups on file.     This consult was sent back the referring physician, Dr. Kruger.          No LMP recorded. Patient is premenopausal.    /70 (BP Location: Right arm, Patient Position: Sitting, Cuff Size: large)   Pulse 56   Temp 98.3 °F (36.8 °C) (Oral)   Ht 5' 6\" (1.676 m)   Wt 264 lb (119.7 kg)   SpO2 100%   BMI 42.61 kg/m²    Wt Readings from Last 4 Encounters:   08/08/24 264 lb (119.7 kg)   05/02/24 259 lb (117.5 kg)   02/05/24 255 lb (115.7 kg)   08/08/23 263 lb (119.3 kg)     Body mass index is 42.61 kg/m².     Labs:   Lab Results   Component Value Date/Time    WBC 9.3 02/09/2024 09:04 AM    HGB 15.6 02/09/2024 09:04 AM    .0 02/09/2024 09:04 AM      Lab Results   Component Value Date/Time     (H) 02/09/2024 09:04 AM     02/09/2024 09:04 AM    K 4.6 02/09/2024 09:04 AM     02/09/2024 09:04 AM    CO2 27.0 02/09/2024 09:04  AM    CREATSERUM 0.97 07/23/2024 08:39 AM    CA 9.7 02/09/2024 09:04 AM    ALB 4.0 02/09/2024 09:04 AM    TP 7.1 02/09/2024 09:04 AM    ALKPHO 81 02/09/2024 09:04 AM    AST 42 (H) 02/09/2024 09:04 AM    ALT 63 (H) 02/09/2024 09:04 AM    BILT 0.6 02/09/2024 09:04 AM    TSH 1.712 02/09/2024 09:04 AM    T4F 1.1 02/09/2024 09:04 AM        Lab Results   Component Value Date/Time    CHOLEST 104 02/09/2024 09:04 AM    HDL 47 02/09/2024 09:04 AM    TRIG 74 02/09/2024 09:04 AM    LDL 42 02/09/2024 09:04 AM    NONHDLC 57 02/09/2024 09:04 AM       Lab Results   Component Value Date/Time    A1C 8.0 (H) 02/09/2024 09:04 AM      Lab Results   Component Value Date    VITD 39.3 02/09/2024         Health Maintenance   Topic Date Due    Mammogram  07/28/2024       Current Outpatient Medications   Medication Sig Dispense Refill    Insulin Pen Needle (BD PEN NEEDLE LINA 2ND GEN) 32G X 4 MM Does not apply Misc Inject 1 Needle into the skin As Directed. 90 each 3    gabapentin 100 MG Oral Cap Take 1 capsule (100 mg total) by mouth nightly.      Candesartan Cilexetil 8 MG Oral Tab Take 1 tablet (8 mg total) by mouth 2 (two) times daily. 180 tablet 3    insulin degludec (TRESIBA FLEXTOUCH) 200 UNIT/ML Subcutaneous Solution Pen-injector 40 units qam and 33 units qpm. 36 each 1    Continuous Glucose Sensor (FREESTYLE MARCELLE 3 SENSOR) Does not apply Misc 1 each every 14 (fourteen) days. 24 each 3    levothyroxine 100 MCG Oral Tab TAKE 1 TABLET BEFORE BREAKFAST 90 tablet 3    semaglutide (OZEMPIC, 2 MG/DOSE,) 8 MG/3ML Subcutaneous Solution Pen-injector Inject 2 mg into the skin once a week. 9 mL 3    amLODIPine 2.5 MG Oral Tab Take 1 tablet (2.5 mg total) by mouth 2 (two) times daily. 180 tablet 3    atenolol 25 MG Oral Tab Take 1 tablet (25 mg total) by mouth nightly. 90 tablet 3    spironolactone 100 MG Oral Tab TAKE 1 TABLET EVERY MORNING AND ONE-HALF (1/2) TABLET IN THE EVENING 135 tablet 3    rosuvastatin 5 MG Oral Tab Take 1 tablet (5 mg  total) by mouth nightly.      zolpidem (AMBIEN) 5 MG Oral Tab Take 1 tablet (5 mg total) by mouth nightly as needed for Sleep. 15 tablet 0    KRILL OIL OR Take 1 capsule by mouth daily.      Calcium Carbonate-Vitamin D (CALCIUM + D OR) Take by mouth.      famotidine 10 MG Oral Tab Take 1 tablet (10 mg total) by mouth 2 (two) times daily. 60 tablet 1    Multiple Vitamins-Minerals (MULTIVITAMIN OR) Take by mouth daily.        Olopatadine HCl 0.1 % Ophthalmic Solution Place 1 drop into both eyes 2 (two) times daily as needed.        Past Medical History:    Calculus of kidney    Cancer (HCC)    kidney    Diabetes (HCC)    Disorder of thyroid    Epicondylitis, lateral    [facility].     Essential hypertension    High blood pressure    PONV (postoperative nausea and vomiting)    difficulty arousing       Past Surgical History:   Procedure Laterality Date    Appendectomy  2008    Arthroscopy of joint unlisted Left 1-4-16    Labral tear reapir, iliopsoas burscetomy. (hip)    Arthroscopy of joint unlisted Right     bone spur and torn cartilage shoulder    Carpal tunnel release Right 2014    Carpal tunnel release Left 01/2019    [facility].    Cholecystectomy      Colonoscopy  05/2020    normal    Correct bunion,othr methods Right     Foot surgery Right     torn ligament repaired    Nephrectomy Right 10/01/2018    Partial nephrectomy    Other Right 2005    arthroscopy bone spur removal     Other Left 01/2019    Status post Left lateral epicondylar release.  Done by [facility].    Other Right     toe ligament repair      Family History   Problem Relation Age of Onset    Diabetes Father     Arthritis Father     Hypertension Father     Heart Disease Father 75        aortic valve disease    Cancer Father         Skin cancer.     Pacemaker Father     Arthritis Mother     Cancer Mother         lung, skin and MDS    Hypertension Mother     Heart Disorder Mother 60        CAD S/P MI.     Cancer  Brother         Hodgkin's    Hypertension Brother     Cancer Maternal Grandmother         Hepatoma.     Cancer Paternal Uncle         Stomach.       Social History:  Social History     Socioeconomic History    Marital status:     Number of children: 0   Occupational History    Occupation: Finance.    Tobacco Use    Smoking status: Never    Smokeless tobacco: Never   Vaping Use    Vaping status: Never Used   Substance and Sexual Activity    Alcohol use: Yes     Alcohol/week: 0.0 standard drinks of alcohol     Comment: occassionally     Drug use: No    Sexual activity: Yes     Partners: Male     Birth control/protection: Pill   Other Topics Concern    Caffeine Concern Yes     Comment: 5 8oz of coffee and soda daily    Exercise No         OB History    Para Term  AB Living   0 0 0 0 0 0   SAB IAB Ectopic Multiple Live Births   0 0 0 0 0        Patient here for follow up of Diabetes.  Has been taking medications regularly.    Checks sugars 1 times daily. 3 cortisone shots in last 3 months. No lows. CGM.   Fasting sugars [id number]'s.  2 hrs. -200's.   Watches diabetic diet, low salt. Cooks more. Out of job since 2 months.   Diabetic Flow sheet      Latest Ref Rng & Units 2024    12:49 PM 2024    11:58 AM 2024    12:00 AM 2024    11:38 PM 2024     5:26 PM   DIABETIC FLOWSHEET ([facility])   BMI   42.61 kg/m2      Microalbumin Urine mg/dL     1.30    Microalb/Creatinine Calc <=30.0 ug/mg     6.1    Candesartan Cilexetil  8 mg BID OR    8 mg BID OR     8 mg BID OR     8 mg BID OR     8 mg BID OR       Weight (enc vitals)   264 lb      BP (enc vitals)   110/70      Last Foot Exam     2024        Medication marked as long-term        Hypertension  Patient is here for follow up of hypertension. BP at home: not check.   Has been compliant with medications.  Exercise level: exercises 3 times a  week  (bike X 30 minutes X 6 weeks since cleared by ortho. And out of job ) and has been  following low salt diet.  Weight has been up.  Wt Readings from Last 3 Encounters:   08/08/24 264 lb (119.7 kg)   05/02/24 259 lb (117.5 kg)   02/05/24 255 lb (115.7 kg)     BP Readings from Last 3 Encounters:   08/08/24 110/70   05/02/24 114/71   02/12/24 122/67       Labs:   Lab Results   Component Value Date/Time     (H) 02/09/2024 09:04 AM     02/09/2024 09:04 AM    K 4.6 02/09/2024 09:04 AM     02/09/2024 09:04 AM    CO2 27.0 02/09/2024 09:04 AM    CREATSERUM 0.97 07/23/2024 08:39 AM    CA 9.7 02/09/2024 09:04 AM    AST 42 (H) 02/09/2024 09:04 AM    ALT 63 (H) 02/09/2024 09:04 AM    TSH 1.712 02/09/2024 09:04 AM    T4F 1.1 02/09/2024 09:04 AM        Lab Results   Component Value Date/Time    CHOLEST 104 02/09/2024 09:04 AM    HDL 47 02/09/2024 09:04 AM    TRIG 74 02/09/2024 09:04 AM    LDL 42 02/09/2024 09:04 AM    NONHDLC 57 02/09/2024 09:04 AM          Labs:   Lab Results   Component Value Date/Time     (H) 02/09/2024 09:04 AM     02/09/2024 09:04 AM    K 4.6 02/09/2024 09:04 AM     02/09/2024 09:04 AM    CO2 27.0 02/09/2024 09:04 AM    CREATSERUM 0.97 07/23/2024 08:39 AM    CA 9.7 02/09/2024 09:04 AM    AST 42 (H) 02/09/2024 09:04 AM    ALT 63 (H) 02/09/2024 09:04 AM    TSH 1.712 02/09/2024 09:04 AM    T4F 1.1 02/09/2024 09:04 AM        Lab Results   Component Value Date/Time    CHOLEST 104 02/09/2024 09:04 AM    HDL 47 02/09/2024 09:04 AM    TRIG 74 02/09/2024 09:04 AM    LDL 42 02/09/2024 09:04 AM    NONHDLC 57 02/09/2024 09:04 AM          Rufus mckenzie. HNP and 2 injections. SP PT.           Review of Systems   Constitutional:  Positive for activity change. Negative for appetite change, chills, diaphoresis, fatigue, fever and unexpected weight change.   HENT:  Negative for congestion, dental problem, drooling, ear discharge, ear pain, facial swelling, hearing loss, mouth sores, nosebleeds, postnasal drip, rhinorrhea, sinus pressure, sinus pain, sneezing, sore throat,  tinnitus, trouble swallowing and voice change.    Eyes:  Negative for photophobia, pain, discharge, redness, itching and visual disturbance.   Respiratory:  Negative for apnea, cough, choking, chest tightness, shortness of breath, wheezing and stridor.    Cardiovascular:  Negative for chest pain, palpitations and leg swelling.   Gastrointestinal:  Negative for abdominal distention, abdominal pain, anal bleeding, blood in stool, constipation, diarrhea, nausea, rectal pain and vomiting.   Endocrine: Negative for cold intolerance, heat intolerance, polydipsia, polyphagia and polyuria.   Genitourinary:  Negative for decreased urine volume, difficulty urinating, dysuria, flank pain, frequency, hematuria, menstrual problem, pelvic pain, urgency, vaginal bleeding, vaginal discharge and vaginal pain.   Skin:  Negative for rash.   Neurological:  Negative for dizziness, tremors, seizures, syncope, facial asymmetry, speech difficulty, weakness, light-headedness, numbness and headaches.   Psychiatric/Behavioral:  Negative for agitation, behavioral problems, confusion, decreased concentration, dysphoric mood, hallucinations, self-injury, sleep disturbance and suicidal ideas. The patient is not nervous/anxious and is not hyperactive.    All other systems reviewed and are negative.        Current Outpatient Medications   Medication Sig Dispense Refill    Insulin Pen Needle (BD PEN NEEDLE LINA 2ND GEN) 32G X 4 MM Does not apply Misc Inject 1 Needle into the skin As Directed. 90 each 3    gabapentin 100 MG Oral Cap Take 1 capsule (100 mg total) by mouth nightly.      Candesartan Cilexetil 8 MG Oral Tab Take 1 tablet (8 mg total) by mouth 2 (two) times daily. 180 tablet 3    insulin degludec (TRESIBA FLEXTOUCH) 200 UNIT/ML Subcutaneous Solution Pen-injector 40 units qam and 33 units qpm. 36 each 1    Continuous Glucose Sensor (FREESTYLE MARCELLE 3 SENSOR) Does not apply Misc 1 each every 14 (fourteen) days. 24 each 3    levothyroxine  100 MCG Oral Tab TAKE 1 TABLET BEFORE BREAKFAST 90 tablet 3    semaglutide (OZEMPIC, 2 MG/DOSE,) 8 MG/3ML Subcutaneous Solution Pen-injector Inject 2 mg into the skin once a week. 9 mL 3    amLODIPine 2.5 MG Oral Tab Take 1 tablet (2.5 mg total) by mouth 2 (two) times daily. 180 tablet 3    atenolol 25 MG Oral Tab Take 1 tablet (25 mg total) by mouth nightly. 90 tablet 3    spironolactone 100 MG Oral Tab TAKE 1 TABLET EVERY MORNING AND ONE-HALF (1/2) TABLET IN THE EVENING 135 tablet 3    rosuvastatin 5 MG Oral Tab Take 1 tablet (5 mg total) by mouth nightly.      zolpidem (AMBIEN) 5 MG Oral Tab Take 1 tablet (5 mg total) by mouth nightly as needed for Sleep. 15 tablet 0    KRILL OIL OR Take 1 capsule by mouth daily.      Calcium Carbonate-Vitamin D (CALCIUM + D OR) Take by mouth.      famotidine 10 MG Oral Tab Take 1 tablet (10 mg total) by mouth 2 (two) times daily. 60 tablet 1    Multiple Vitamins-Minerals (MULTIVITAMIN OR) Take by mouth daily.        Olopatadine HCl 0.1 % Ophthalmic Solution Place 1 drop into both eyes 2 (two) times daily as needed.       Allergies:  Allergies   Allergen Reactions    Lisinopril Coughing    Metformin DIARRHEA       HISTORY:  Past Medical History:    Calculus of kidney    Cancer (HCC)    kidney    Diabetes (HCC)    Disorder of thyroid    Epicondylitis, lateral    B/L. [facility].     Essential hypertension    High blood pressure    PONV (postoperative nausea and vomiting)    difficulty arousing       Past Surgical History:   Procedure Laterality Date    Appendectomy  2008    Arthroscopy of joint unlisted Left 1-4-16    Labral tear reapir, iliopsoas burscetomy. (hip)    Arthroscopy of joint unlisted Right     bone spur and torn cartilage shoulder    Carpal tunnel release Right 2014    Carpal tunnel release Left 01/2019    [facility].    Cholecystectomy      Colonoscopy  05/2020    normal    Correct bunion,othr methods Right     Foot surgery Right     torn  ligament repaired    Nephrectomy Right 10/01/2018    Partial nephrectomy    Other Right 2005    arthroscopy bone spur removal     Other Left 01/2019    Status post Left lateral epicondylar release.  Done by [facility].    Other Right     toe ligament repair      Family History   Problem Relation Age of Onset    Diabetes Father     Arthritis Father     Hypertension Father     Heart Disease Father 75        aortic valve disease    Cancer Father         Skin cancer.     Pacemaker Father     Arthritis Mother     Cancer Mother         lung, skin and MDS    Hypertension Mother     Heart Disorder Mother 60        CAD S/P MI.     Cancer Brother         Hodgkin's    Hypertension Brother     Cancer Maternal Grandmother         Hepatoma.     Cancer Paternal Uncle         Stomach.       Social History:   Social History     Socioeconomic History    Marital status:     Number of children: 0   Occupational History    Occupation: Finance.    Tobacco Use    Smoking status: Never    Smokeless tobacco: Never   Vaping Use    Vaping status: Never Used   Substance and Sexual Activity    Alcohol use: Yes     Alcohol/week: 0.0 standard drinks of alcohol     Comment: occassionally     Drug use: No    Sexual activity: Yes     Partners: Male     Birth control/protection: Pill   Other Topics Concern    Caffeine Concern Yes     Comment: 5 8oz of coffee and soda daily    Exercise No   Social History Narrative    The patient does not use an assistive device..      The patient does live in a home with stairs.        PHYSICAL EXAM:   /70 (BP Location: Right arm, Patient Position: Sitting, Cuff Size: large)   Pulse 56   Temp 98.3 °F (36.8 °C) (Oral)   Ht 5' 6\" (1.676 m)   Wt 264 lb (119.7 kg)   SpO2 100%   BMI 42.61 kg/m²   BP Readings from Last 3 Encounters:   08/08/24 110/70   05/02/24 114/71   02/12/24 122/67     Wt Readings from Last 3 Encounters:   08/08/24 264 lb (119.7 kg)   05/02/24 259 lb (117.5 kg)   02/05/24  255 lb (115.7 kg)       Physical Exam  Vitals and nursing note reviewed.   Constitutional:       General: She is not in acute distress.     Appearance: Normal appearance. She is well-developed and well-groomed. She is not ill-appearing, toxic-appearing or diaphoretic.      Interventions: She is not intubated.  HENT:      Head: Normocephalic and atraumatic.      Right Ear: Tympanic membrane, ear canal and external ear normal. No decreased hearing noted. No laceration, drainage, swelling or tenderness. No middle ear effusion. There is no impacted cerumen. No foreign body. No mastoid tenderness. No PE tube. No hemotympanum. Tympanic membrane is not injected, scarred, perforated, erythematous, retracted or bulging. Tympanic membrane has normal mobility.      Left Ear: Tympanic membrane, ear canal and external ear normal. No decreased hearing noted. No laceration, drainage, swelling or tenderness.  No middle ear effusion. There is no impacted cerumen. No foreign body. No mastoid tenderness. No PE tube. No hemotympanum. Tympanic membrane is not injected, scarred, perforated, erythematous, retracted or bulging. Tympanic membrane has normal mobility.      Nose:      Right Sinus: No maxillary sinus tenderness or frontal sinus tenderness.      Left Sinus: No maxillary sinus tenderness or frontal sinus tenderness.      Mouth/Throat:      Lips: Pink. No lesions.      Mouth: Mucous membranes are moist. No injury, lacerations, oral lesions or angioedema.      Dentition: No dental tenderness, gingival swelling, dental abscesses or gum lesions.      Tongue: No lesions. Tongue does not deviate from midline.      Palate: No mass and lesions.      Pharynx: Oropharynx is clear. Uvula midline. No pharyngeal swelling, oropharyngeal exudate, posterior oropharyngeal erythema or uvula swelling.      Tonsils: No tonsillar exudate or tonsillar abscesses.   Eyes:      General: Lids are normal. Gaze aligned appropriately. No scleral icterus.         Right eye: No foreign body, discharge or hordeolum.         Left eye: No foreign body, discharge or hordeolum.      Extraocular Movements: Extraocular movements intact.      Right eye: Normal extraocular motion and no nystagmus.      Left eye: Normal extraocular motion and no nystagmus.      Conjunctiva/sclera: Conjunctivae normal.      Right eye: Right conjunctiva is not injected. No chemosis, exudate or hemorrhage.     Left eye: Left conjunctiva is not injected. No chemosis, exudate or hemorrhage.     Pupils: Pupils are equal, round, and reactive to light.   Neck:      Thyroid: No thyroid mass, thyromegaly or thyroid tenderness.      Vascular: Normal carotid pulses. No carotid bruit, hepatojugular reflux or JVD.      Trachea: Trachea and phonation normal. No tracheal tenderness, tracheostomy, abnormal tracheal secretions or tracheal deviation.   Cardiovascular:      Rate and Rhythm: Normal rate and regular rhythm.      Pulses: Normal pulses.           Carotid pulses are 2+ on the right side and 2+ on the left side.       Radial pulses are 2+ on the right side and 2+ on the left side.        Dorsalis pedis pulses are 2+ on the right side and 2+ on the left side.        Posterior tibial pulses are 2+ on the right side and 2+ on the left side.      Heart sounds: Normal heart sounds, S1 normal and S2 normal.   Pulmonary:      Effort: Pulmonary effort is normal. No tachypnea, bradypnea, accessory muscle usage, prolonged expiration, respiratory distress or retractions. She is not intubated.      Breath sounds: Normal breath sounds and air entry. No stridor, decreased air movement or transmitted upper airway sounds. No decreased breath sounds, wheezing, rhonchi or rales.   Chest:      Chest wall: No tenderness.   Abdominal:      General: Bowel sounds are normal. There is no distension.      Palpations: Abdomen is soft. Abdomen is not rigid. There is no fluid wave, hepatomegaly, splenomegaly or mass.      Tenderness:  There is no abdominal tenderness. There is no right CVA tenderness, left CVA tenderness, guarding or rebound.   Musculoskeletal:      Cervical back: Neck supple. No tenderness.      Right lower leg: No edema.      Left lower leg: No edema.   Lymphadenopathy:      Head:      Right side of head: No submental, submandibular, preauricular, posterior auricular or occipital adenopathy.      Left side of head: No submental, submandibular, preauricular, posterior auricular or occipital adenopathy.      Cervical: No cervical adenopathy.      Right cervical: No superficial, deep or posterior cervical adenopathy.     Left cervical: No superficial, deep or posterior cervical adenopathy.      Upper Body:      Right upper body: No supraclavicular adenopathy.      Left upper body: No supraclavicular adenopathy.   Skin:     General: Skin is warm and dry.      Coloration: Skin is not pale.      Findings: No erythema or rash.   Neurological:      Mental Status: She is alert and oriented to person, place, and time.   Psychiatric:         Mood and Affect: Mood normal.         Speech: Speech normal.         Behavior: Behavior normal. Behavior is cooperative.              ASSESSMENT/PLAN:     Encounter Diagnoses   Name Primary?    Chronic kidney disease (CKD) stage G2/A2, mildly decreased glomerular filtration rate (GFR) between 60-89 mL/min/1.73 square meter and albuminuria creatinine ratio between  mg/g Stable. No motrin, ibuprofen, advil, alleve, naprosyn  with these medications.     Yes    Elevated liver enzymes Resolved.        Fatty liver disease, nonalcoholic Weight loss.        Primary hypertension Stable. Careful with diet and excercise at least 30 minutes 3-4 times a week. Check blood pressures at different times on different days. Can purchase own blood pressure monitor. If not, check at local pharmacy. Bake foods more and grill occasionally. Avoid fried foods. No salt. Use other seasonings.         Hypothyroidism due to  acquired atrophy of thyroid Stable.        Multinodular goiter Stable.           Pancreatic cyst (HCC) FU MRI stable.        Renal cell carcinoma of right kidney (HCC) Stable.        Type 2 diabetes mellitus without complication, without long-term current use of insulin (HCC) Higher. Hold changes. Careful with diet and excercise at least 30 minutes 3-4 times a week. Check sugars at different times on different dates. Careful with low sugars. Carry something with you and check sugar if can. Can carry aristides cracker, etc. Decrease carbohydrates. But also, careful with fruits and natural sugars.One serving a day and no more than 1 handful every day. Check feet  every AM and careful with sores and ulcers on feet bilaterally. Check eyes every year with dilated eye exam.  Check sugars.  2-hour postmeal should be less than 140s.  Pre-meal should be 's.  Both equally affected A1c.  Discussed importance of glycemic control to prevent complications of diabetes  -Discussed complications of diabetes include retinopathy, neuropathy, nephropathy and cardiovascular disease  -Discussed ABCs of DM  -Discussed importance of SBGM  -Discussed importance of low CHO diet, recommend 45gm per meal or 135gm per day  -Normal foot exam  -UTD with optho  -Normotensive        Encounter for screening mammogram for malignant neoplasm of breast Check mammogram. Scheduled for today at [facility]. Continue self breast exam every month.        Current Outpatient Medications   Medication Sig Instructions Comments    Insulin Pen Needle (BD PEN NEEDLE LINA 2ND GEN) 32G X 4 MM Does not apply Misc Inject 1 Needle into the skin As Directed. Take as usual     gabapentin 100 MG Oral Cap Take 1 capsule (100 mg total) by mouth nightly. Take as usual     Candesartan Cilexetil 8 MG Oral Tab Take 1 tablet (8 mg total) by mouth 2 (two) times daily. Hold morning of     insulin degludec (TRESIBA FLEXTOUCH) 200 UNIT/ML Subcutaneous Solution Pen-injector 40 units qam  and 33 units qpm. Hold AM of sugery. Take 1/2 dose night before.      Continuous Glucose Sensor (FREESTYLE MARCELLE 3 SENSOR) Does not apply Misc 1 each every 14 (fourteen) days. Take as usual     levothyroxine 100 MCG Oral Tab TAKE 1 TABLET BEFORE BREAKFAST Take as usual     semaglutide (OZEMPIC, 2 MG/DOSE,) 8 MG/3ML Subcutaneous Solution Pen-injector Inject 2 mg into the skin once a week. Hold 1 week prior.      amLODIPine 2.5 MG Oral Tab Take 1 tablet (2.5 mg total) by mouth 2 (two) times daily. Take as usual     atenolol 25 MG Oral Tab Take 1 tablet (25 mg total) by mouth nightly. Take as usual     spironolactone 100 MG Oral Tab TAKE 1 TABLET EVERY MORNING AND ONE-HALF (1/2) TABLET IN THE EVENING Hold morning of     rosuvastatin 5 MG Oral Tab Take 1 tablet (5 mg total) by mouth nightly. Take as usual     zolpidem (AMBIEN) 5 MG Oral Tab Take 1 tablet (5 mg total) by mouth nightly as needed for Sleep. Take as usual     KRILL OIL OR Take 1 capsule by mouth daily. Hold 1 week.      Calcium Carbonate-Vitamin D (CALCIUM + D OR) Take by mouth. Hold 1 week prior.      famotidine 10 MG Oral Tab Take 1 tablet (10 mg total) by mouth 2 (two) times daily. Take as usual     Multiple Vitamins-Minerals (MULTIVITAMIN OR) Take by mouth daily.   Hold 1 week prior.      Olopatadine HCl 0.1 % Ophthalmic Solution Place 1 drop into both eyes 2 (two) times daily as needed. Take as usual       No motrin, ibuprofen, advil, alleve, naprosyn at least 10 days prior to surgery. Take all other medications with small sips of water on AM of surgery, unless otherwise directed. Don't eat breakfest that AM. All other medications take with a sip of water even on the morning of surgery.  Hold all over-the-counter natural herbal supplements and vitamins at least a week before surgery.  Anticoagulants: hold plavix for 5 days prior to procedure with cardiology approval, discussed with patient the increased risk of stroke/MI when alterations in therapy  occur - patient verbalizes understanding and wishes to proceed. Aspirin 81 mg patient instructed to continue this.     No orders of the defined types were placed in this encounter.      Meds This Visit:  Requested Prescriptions      No prescriptions requested or ordered in this encounter       Imaging & Referrals:  OPHTHALMOLOGY - INTERNAL        RTC 3 months for FU.

## 2024-08-08 NOTE — PATIENT INSTRUCTIONS
ASSESSMENT/PLAN:     Encounter Diagnoses   Name Primary?    Chronic kidney disease (CKD) stage G2/A2, mildly decreased glomerular filtration rate (GFR) between 60-89 mL/min/1.73 square meter and albuminuria creatinine ratio between  mg/g Stable. No motrin, ibuprofen, advil, alleve, naprosyn  with these medications.     Yes    Elevated liver enzymes Resolved.        Fatty liver disease, nonalcoholic Weight loss.        Primary hypertension Stable. Careful with diet and excercise at least 30 minutes 3-4 times a week. Check blood pressures at different times on different days. Can purchase own blood pressure monitor. If not, check at local pharmacy. Bake foods more and grill occasionally. Avoid fried foods. No salt. Use other seasonings.         Hypothyroidism due to acquired atrophy of thyroid Stable.        Multinodular goiter Stable.           Pancreatic cyst (HCC) FU MRI stable.        Renal cell carcinoma of right kidney (HCC) Stable.        Type 2 diabetes mellitus without complication, without long-term current use of insulin (HCC) Higher. Hold changes. Careful with diet and excercise at least 30 minutes 3-4 times a week. Check sugars at different times on different dates. Careful with low sugars. Carry something with you and check sugar if can. Can carry aristides cracker, etc. Decrease carbohydrates. But also, careful with fruits and natural sugars.One serving a day and no more than 1 handful every day. Check feet  every AM and careful with sores and ulcers on feet bilaterally. Check eyes every year with dilated eye exam.  Check sugars.  2-hour postmeal should be less than 140s.  Pre-meal should be 's.  Both equally affected A1c.  Discussed importance of glycemic control to prevent complications of diabetes  -Discussed complications of diabetes include retinopathy, neuropathy, nephropathy and cardiovascular disease  -Discussed ABCs of DM  -Discussed importance of SBGM  -Discussed importance of low CHO  diet, recommend 45gm per meal or 135gm per day  -Normal foot exam  -UTD with optho  -Normotensive        Encounter for screening mammogram for malignant neoplasm of breast Check mammogram. Scheduled for today at [facility]. Continue self breast exam every month.        Current Outpatient Medications   Medication Sig Instructions Comments    Insulin Pen Needle (BD PEN NEEDLE LINA 2ND GEN) 32G X 4 MM Does not apply Misc Inject 1 Needle into the skin As Directed. Take as usual     gabapentin 100 MG Oral Cap Take 1 capsule (100 mg total) by mouth nightly. Take as usual     Candesartan Cilexetil 8 MG Oral Tab Take 1 tablet (8 mg total) by mouth 2 (two) times daily. Hold morning of     insulin degludec (TRESIBA FLEXTOUCH) 200 UNIT/ML Subcutaneous Solution Pen-injector 40 units qam and 33 units qpm. Hold AM of sugery. Take 1/2 dose night before.      Continuous Glucose Sensor (FREESTYLE MARCELLE 3 SENSOR) Does not apply Misc 1 each every 14 (fourteen) days. Take as usual     levothyroxine 100 MCG Oral Tab TAKE 1 TABLET BEFORE BREAKFAST Take as usual     semaglutide (OZEMPIC, 2 MG/DOSE,) 8 MG/3ML Subcutaneous Solution Pen-injector Inject 2 mg into the skin once a week. Hold 1 week prior.      amLODIPine 2.5 MG Oral Tab Take 1 tablet (2.5 mg total) by mouth 2 (two) times daily. Take as usual     atenolol 25 MG Oral Tab Take 1 tablet (25 mg total) by mouth nightly. Take as usual     spironolactone 100 MG Oral Tab TAKE 1 TABLET EVERY MORNING AND ONE-HALF (1/2) TABLET IN THE EVENING Hold morning of     rosuvastatin 5 MG Oral Tab Take 1 tablet (5 mg total) by mouth nightly. Take as usual     zolpidem (AMBIEN) 5 MG Oral Tab Take 1 tablet (5 mg total) by mouth nightly as needed for Sleep. Take as usual     KRILL OIL OR Take 1 capsule by mouth daily. Hold 1 week.      Calcium Carbonate-Vitamin D (CALCIUM + D OR) Take by mouth. Hold 1 week prior.      famotidine 10 MG Oral Tab Take 1 tablet (10 mg total) by mouth 2 (two) times daily. Take as  usual     Multiple Vitamins-Minerals (MULTIVITAMIN OR) Take by mouth daily.   Hold 1 week prior.      Olopatadine HCl 0.1 % Ophthalmic Solution Place 1 drop into both eyes 2 (two) times daily as needed. Take as usual       No motrin, ibuprofen, advil, alleve, naprosyn at least 10 days prior to surgery. Take all other medications with small sips of water on AM of surgery, unless otherwise directed. Don't eat breakfest that AM. All other medications take with a sip of water even on the morning of surgery.  Hold all over-the-counter natural herbal supplements and vitamins at least a week before surgery.  Anticoagulants: hold plavix for 5 days prior to procedure with cardiology approval, discussed with patient the increased risk of stroke/MI when alterations in therapy occur - patient verbalizes understanding and wishes to proceed. Aspirin 81 mg patient instructed to continue this.     No orders of the defined types were placed in this encounter.      Meds This Visit:  Requested Prescriptions      No prescriptions requested or ordered in this encounter       Imaging & Referrals:  OPHTHALMOLOGY - INTERNAL        RTC 3 months for FU.

## 2024-08-08 NOTE — TELEPHONE ENCOUNTER
If okay to schedule can do 8:30 AM November 8, 2024 at [facility] for follow-up on diabetes.

## 2024-08-09 RX ORDER — INSULIN DEGLUDEC 200 U/ML
INJECTION, SOLUTION SUBCUTANEOUS
Qty: 33 ML | Refills: 3 | Status: SHIPPED | OUTPATIENT
Start: 2024-08-09

## 2024-08-09 RX ORDER — INSULIN DEGLUDEC 200 U/ML
INJECTION, SOLUTION SUBCUTANEOUS
Qty: 36 ML | Refills: 4 | OUTPATIENT
Start: 2024-08-09

## 2024-08-09 NOTE — TELEPHONE ENCOUNTER
From: Rekha Moreno  To: Leonila Maradiaga  Sent: 8/8/2024 1:32 PM CDT  Subject: DELROY Moreno EKG    Dr Maradiaga-    Attached is a picture of the EKG printout that was done on 8-6 as a pre-surgery test.    Thanks for getting your report to the surgeon as time permits.    Rekha

## 2024-08-09 NOTE — TELEPHONE ENCOUNTER
Please review; protocol failed/No Protocol    05/02/2024-script did not go to pharmacy- print script    Requested Prescriptions   Pending Prescriptions Disp Refills    insulin degludec (TRESIBA FLEXTOUCH) 200 UNIT/ML Subcutaneous Solution Pen-injector 33 mL 3     Sig: Inject 40 Units into the skin every morning AND 33 Units every evening.       Diabetes Medication Protocol Failed - 8/9/2024  8:45 AM        Failed - Last A1C < 7.5 and within past 6 months     Lab Results   Component Value Date    A1C 8.0 (H) 02/09/2024             Passed - In person appointment or virtual visit in the past 6 mos or appointment in next 3 mos     Recent Outpatient Visits              Yesterday Chronic kidney disease (CKD) stage G2/A2, mildly decreased glomerular filtration rate (GFR) between 60-89 mL/min/1.73 square meter and albuminuria creatinine ratio between  mg/g    [facility]    [facility]    3 months ago Adult general medical exam    [facility]    [facility] Visit    6 months ago Multinodular goiter    [facility]    [facility] Visit    1 year ago Type 2 diabetes mellitus without complication, without long-term current use of insulin ([facility])    [facility] Leonila Osuna MD    Office Visit    1 year ago Type 2 diabetes mellitus without complication, without long-term current use of insulin ([facility])    [facility]          Future Appointments         Provider Department Appt Notes    In 3 months Leonila Maradiaga MD [facility], [place], [place] follow-up on diabetes ok per dr                    Passed - Microalbumin procedure in past 12 months or taking ACE/ARB        Passed - EGFRCR or GFRNAA >  50     GFR Evaluation  EGFRCR: 69 , resulted on 7/23/2024          Passed - GFR in the past 12 months         Refused Prescriptions Disp Refills    insulin degludec (TRESIBA FLEXTOUCH) 200 UNIT/ML Subcutaneous Solution Pen-injector [Pharmacy Med Name: TRESIBA FLEXTOUCH PEN 3ML 3'S 200U/ML] 36 mL 4     Sig: INJECT 50 UNITS UNDER THE SKIN EVERY 12 HOURS       Diabetes Medication Protocol Failed - 8/9/2024  8:45 AM        Failed - Last A1C < 7.5 and within past 6 months     Lab Results   Component Value Date    A1C 8.0 (H) 02/09/2024             Passed - In person appointment or virtual visit in the past 6 mos or appointment in next 3 mos     Recent Outpatient Visits              Yesterday Chronic kidney disease (CKD) stage G2/A2, mildly decreased glomerular filtration rate (GFR) between 60-89 mL/min/1.73 square meter and albuminuria creatinine ratio between  mg/g    [facility], Leonila Osuna MD    Office Visit    3 months ago Adult general medical exam    [facility], Leonila Osuna MD    Office Visit    6 months ago Multinodular goiter    [facility]    [facility] Visit    1 year ago Type 2 diabetes mellitus without complication, without long-term current use of insulin ([facility])    [facility]    Office Visit    1 year ago Type 2 diabetes mellitus without complication, without long-term current use of insulin ([facility])    [facility]    [facility]          Future Appointments         Provider Department Appt Notes    In 3 months Leonila Maradiaga MD [facility], [place] [facility], [place] follow-up on diabetes ok per dr                    Passed - Microalbumin procedure in past 12 months or taking ACE/ARB        Passed - EGFRCR  or GFRNAA > 50     GFR Evaluation  EGFRCR: 69 , resulted on 7/23/2024          Passed - GFR in the past 12 months           Future Appointments         Provider Department Appt Notes    In 3 months Leonila Maradiaga MD [facility], [place] follow-up on diabetes ok per           Recent Outpatient Visits              Yesterday Chronic kidney disease (CKD) stage G2/A2, mildly decreased glomerular filtration rate (GFR) between 60-89 mL/min/1.73 square meter and albuminuria creatinine ratio between  mg/g    [facility]    [facility]    3 months ago Adult general medical exam    [facility]    Office Visit    6 months ago Multinodular goiter    [facility]    Office Visit    1 year ago Type 2 diabetes mellitus without complication, without long-term current use of insulin ([facility])    [facility]    Office Visit    1 year ago Type 2 diabetes mellitus without complication, without long-term current use of insulin ([facility])    [facility]    Office Visit

## 2024-08-09 NOTE — TELEPHONE ENCOUNTER
Needs actual EKG to put together with labs and H&P to send to surgeon before 12 noon August 9, 2024.

## 2024-08-16 ENCOUNTER — LAB ENCOUNTER (OUTPATIENT)
Dept: LAB | Facility: HOSPITAL | Age: 54
End: 2024-08-16
Attending: INTERNAL MEDICINE
Payer: COMMERCIAL

## 2024-08-16 ENCOUNTER — NURSE TRIAGE (OUTPATIENT)
Dept: INTERNAL MEDICINE CLINIC | Facility: CLINIC | Age: 54
End: 2024-08-16

## 2024-08-16 DIAGNOSIS — N18.2 CHRONIC KIDNEY DISEASE (CKD) STAGE G2/A2, MILDLY DECREASED GLOMERULAR FILTRATION RATE (GFR) BETWEEN 60-89 ML/MIN/1.73 SQUARE METER AND ALBUMINURIA CREATININE RATIO BETWEEN 30-299 MG/G: ICD-10-CM

## 2024-08-16 DIAGNOSIS — R19.7 DIARRHEA, UNSPECIFIED TYPE: ICD-10-CM

## 2024-08-16 DIAGNOSIS — N18.2 CHRONIC KIDNEY DISEASE (CKD) STAGE G2/A2, MILDLY DECREASED GLOMERULAR FILTRATION RATE (GFR) BETWEEN 60-89 ML/MIN/1.73 SQUARE METER AND ALBUMINURIA CREATININE RATIO BETWEEN 30-299 MG/G: Primary | ICD-10-CM

## 2024-08-16 LAB — C DIFF TOX B STL QL: NEGATIVE

## 2024-08-16 PROCEDURE — 87493 C DIFF AMPLIFIED PROBE: CPT

## 2024-08-16 NOTE — TELEPHONE ENCOUNTER
Use immodium as needed. But careful not too much. Wash hands thoroughly. BRAT (Bananas, rice apples, tea, bread, etc). [place] diet. Avoid spicey foods. Avoid dairy for 1 week. OK for yogurt. Increase probiotics.  Check c diff. Check stool studies. Check urine and blood.   ? N,V,   ?ER eval. ?

## 2024-08-16 NOTE — TELEPHONE ENCOUNTER
Reason for Disposition   MODERATE diarrhea (e.g., 4-6 times / day more than normal) and present > 48 hours (2 days)    Protocols used: Diarrhea-A-OH  Action Requested: Summary for Provider     []  Critical Lab, Recommendations Needed  [x] Need Additional Advice  []   FYI    []   Need Orders  [] Need Medications Sent to Pharmacy  []  Other     SUMMARY: patient's  reports that the patient had hip surgery on Monday. By Tuesday afternoon, the patient developed bad intestinal cramping and diarrhea. Wednesday also had chills (didn't take temperature). Cramping and chills are gone but the diarrhea with a small amount of blood continues several times/day. Advised evaluation but patient cannot get around easily after her hip surgery.  Please advise on possible lab/stool orders. Advised dietary recommendations and good hydration.  Pt states that she is basically only drinking Gatorade.     Reason for call: Diarrhea  Onset: Data Unavailable

## 2024-08-20 ENCOUNTER — APPOINTMENT (OUTPATIENT)
Dept: CT IMAGING | Facility: HOSPITAL | Age: 54
End: 2024-08-20
Attending: NURSE PRACTITIONER
Payer: COMMERCIAL

## 2024-08-20 ENCOUNTER — HOSPITAL ENCOUNTER (OUTPATIENT)
Age: 54
Discharge: HOME | End: 2024-08-20
Payer: COMMERCIAL

## 2024-08-20 VITALS
SYSTOLIC BLOOD PRESSURE: 116 MMHG | OXYGEN SATURATION: 98 % | RESPIRATION RATE: 18 BRPM | DIASTOLIC BLOOD PRESSURE: 55 MMHG | HEART RATE: 76 BPM | TEMPERATURE: 98 F

## 2024-08-20 DIAGNOSIS — K52.9 COLITIS: Primary | ICD-10-CM

## 2024-08-20 DIAGNOSIS — R10.30 LOWER ABDOMINAL PAIN: ICD-10-CM

## 2024-08-20 DIAGNOSIS — R79.89 ELEVATED SERUM CREATININE: ICD-10-CM

## 2024-08-20 DIAGNOSIS — R19.7 DIARRHEA, UNSPECIFIED TYPE: ICD-10-CM

## 2024-08-20 LAB
#MXD IC: 1.1 X10ˆ3/UL (ref 0.1–1)
BASOPHILS # BLD AUTO: 0.1 X10(3) UL (ref 0–0.2)
BASOPHILS NFR BLD AUTO: 0.8 %
BILIRUB UR QL STRIP: NEGATIVE
BUN BLD-MCNC: 14 MG/DL (ref 7–18)
CHLORIDE BLD-SCNC: 96 MMOL/L (ref 98–112)
CLARITY UR: CLEAR
CO2 BLD-SCNC: 27 MMOL/L (ref 21–32)
COLOR UR: YELLOW
CREAT BLD-MCNC: 1.1 MG/DL
DEPRECATED RDW RBC AUTO: 41.6 FL (ref 35.1–46.3)
EGFRCR SERPLBLD CKD-EPI 2021: 59 ML/MIN/1.73M2 (ref 60–?)
EOSINOPHIL # BLD AUTO: 0.04 X10(3) UL (ref 0–0.7)
EOSINOPHIL NFR BLD AUTO: 0.3 %
ERYTHROCYTE [DISTWIDTH] IN BLOOD BY AUTOMATED COUNT: 12.3 % (ref 11–15)
GLUCOSE BLD-MCNC: 148 MG/DL (ref 70–99)
GLUCOSE UR STRIP-MCNC: NEGATIVE MG/DL
HCT VFR BLD AUTO: 46.5 %
HCT VFR BLD AUTO: 46.6 %
HCT VFR BLD CALC: 51 %
HGB BLD-MCNC: 15.5 G/DL
HGB BLD-MCNC: 15.6 G/DL
IMM GRANULOCYTES # BLD AUTO: 0.11 X10(3) UL (ref 0–1)
IMM GRANULOCYTES NFR BLD: 0.8 %
ISTAT IONIZED CALCIUM FOR CHEM 8: 1.2 MMOL/L (ref 1.12–1.32)
KETONES UR STRIP-MCNC: NEGATIVE MG/DL
LEUKOCYTE ESTERASE UR QL STRIP: NEGATIVE
LYMPHOCYTES # BLD AUTO: 2.2 X10ˆ3/UL (ref 1–4)
LYMPHOCYTES # BLD AUTO: 2.39 X10(3) UL (ref 1–4)
LYMPHOCYTES NFR BLD AUTO: 17.2 %
LYMPHOCYTES NFR BLD AUTO: 18.2 %
MCH RBC QN AUTO: 30.6 PG (ref 26–34)
MCH RBC QN AUTO: 31 PG (ref 26–34)
MCHC RBC AUTO-ENTMCNC: 33.3 G/DL (ref 31–37)
MCHC RBC AUTO-ENTMCNC: 33.5 G/DL (ref 31–37)
MCV RBC AUTO: 91.9 FL (ref 80–100)
MCV RBC AUTO: 92.3 FL
MIXED CELL %: 8.9 %
MONOCYTES # BLD AUTO: 1.33 X10(3) UL (ref 0.1–1)
MONOCYTES NFR BLD AUTO: 10.1 %
NEUTROPHILS # BLD AUTO: 9.17 X10 (3) UL (ref 1.5–7.7)
NEUTROPHILS # BLD AUTO: 9.17 X10(3) UL (ref 1.5–7.7)
NEUTROPHILS # BLD AUTO: 9.3 X10ˆ3/UL (ref 1.5–7.7)
NEUTROPHILS NFR BLD AUTO: 69.8 %
NEUTROPHILS NFR BLD AUTO: 73.9 %
NITRITE UR QL STRIP: NEGATIVE
PH UR STRIP: 6 [PH]
PLATELET # BLD AUTO: 336 10(3)UL (ref 150–450)
POTASSIUM BLD-SCNC: 4 MMOL/L (ref 3.6–5.1)
PROT UR STRIP-MCNC: NEGATIVE MG/DL
RBC # BLD AUTO: 5.04 X10(6)UL
RBC # BLD AUTO: 5.07 X10ˆ6/UL
SODIUM BLD-SCNC: 134 MMOL/L (ref 136–145)
SP GR UR STRIP: 1.01
UROBILINOGEN UR STRIP-ACNC: <2 MG/DL
WBC # BLD AUTO: 12.6 X10ˆ3/UL (ref 4–11)
WBC # BLD AUTO: 13.1 X10(3) UL (ref 4–11)

## 2024-08-20 PROCEDURE — 96360 HYDRATION IV INFUSION INIT: CPT | Performed by: NURSE PRACTITIONER

## 2024-08-20 PROCEDURE — 81002 URINALYSIS NONAUTO W/O SCOPE: CPT | Performed by: NURSE PRACTITIONER

## 2024-08-20 PROCEDURE — 99213 OFFICE O/P EST LOW 20 MIN: CPT | Performed by: NURSE PRACTITIONER

## 2024-08-20 PROCEDURE — 74177 CT ABD & PELVIS W/CONTRAST: CPT | Performed by: NURSE PRACTITIONER

## 2024-08-20 PROCEDURE — 85025 COMPLETE CBC W/AUTO DIFF WBC: CPT | Performed by: NURSE PRACTITIONER

## 2024-08-20 PROCEDURE — 80047 BASIC METABLC PNL IONIZED CA: CPT | Performed by: NURSE PRACTITIONER

## 2024-08-20 RX ORDER — SODIUM CHLORIDE 9 MG/ML
1000 INJECTION, SOLUTION INTRAVENOUS ONCE
Status: COMPLETED | OUTPATIENT
Start: 2024-08-20 | End: 2024-08-20

## 2024-08-20 NOTE — ED PROVIDER NOTES
Patient Seen in: [facility]    History   CC: diarrhea   HPI: Rekha Moreno 53 year old female w/ hx DM, HTN, right renal Ca with partial nephrectomy, appendectomy and cholecystectomy who presents c/o diffuse lower abd cramping, bloating and diarrhea. States she had surgical labrum repair with [facility] on 8/12 and was given Norco. Was initially constipated, and was taking Colace, denies any abx use, and diarrhea began 8/14/2024. +now also with some blood in her stool. + had had chills without known fever, nausea without vomiting as well x1wk. Denies URI s/s, rash, urinary s/s, back/flank pain. Denies recent travel.    Past Medical History:    Calculus of kidney    Cancer (HCC)    kidney    Diabetes (HCC)    Disorder of thyroid    Epicondylitis, lateral    B/L. [facility].     Essential hypertension    High blood pressure    PONV (postoperative nausea and vomiting)    difficulty arousing        Past Surgical History:   Procedure Laterality Date    Appendectomy  2008    Arthroscopy of joint unlisted Left 1-4-16    Labral tear reapir, iliopsoas burscetomy. (hip)    Arthroscopy of joint unlisted Right     bone spur and torn cartilage shoulder    Carpal tunnel release Right 2014    Carpal tunnel release Left 01/2019    [facility].    Cholecystectomy      Colonoscopy  05/2020    normal    Correct bunion,othr methods Right     Foot surgery Right     torn ligament repaired    Nephrectomy Right 10/01/2018    Partial nephrectomy    Other Right 2005    arthroscopy bone spur removal     Other Left 01/2019    Status post Left lateral epicondylar release.  Done by [facility].    Other Right     toe ligament repair       Family History   Problem Relation Age of Onset    Diabetes Father     Arthritis Father     Hypertension Father     Heart Disease Father 75        aortic valve disease    Cancer Father         Skin cancer.     Pacemaker Father     Arthritis Mother     Cancer  Mother         lung, skin and MDS    Hypertension Mother     Heart Disorder Mother 60        CAD S/P MI.     Cancer Brother         Hodgkin's    Hypertension Brother     Cancer Maternal Grandmother         Hepatoma.     Cancer Paternal Uncle         Stomach.        Social History     Socioeconomic History    Marital status:     Number of children: 0   Occupational History    Occupation: Finance.    Tobacco Use    Smoking status: Never    Smokeless tobacco: Never   Vaping Use    Vaping status: Never Used   Substance and Sexual Activity    Alcohol use: Yes     Alcohol/week: 0.0 standard drinks of alcohol     Comment: occassionally     Drug use: No    Sexual activity: Yes     Partners: Male     Birth control/protection: Pill   Other Topics Concern    Caffeine Concern Yes     Comment: 5 8oz of coffee and soda daily    Exercise No   Social History Narrative    The patient does not use an assistive device..      The patient does live in a home with stairs.       ROS:  Review of Systems    Positive for stated complaint: Diarrhea; Cramps; Nausea  Other systems are as noted in HPI.  Constitutional and vital signs reviewed.      All other systems reviewed and negative except as noted above.    PSFH elements reviewed from today and agreed except as otherwise stated in HPI.             Constitutional and vital signs reviewed.        Physical Exam     ED Triage Vitals   BP 08/20/24 1214 117/55   Pulse 08/20/24 1214 98   Resp 08/20/24 1214 18   Temp 08/20/24 1212 98.2 °F (36.8 °C)   Temp src 08/20/24 1214 Oral   SpO2 08/20/24 1214 97 %   O2 Device 08/20/24 1214 None (Room air)       Current:/55   Pulse 76   Temp 98.2 °F (36.8 °C) (Oral)   Resp 18   SpO2 98%         PE:  General - Appears well, non-toxic and in NAD  Head - Appears symmetrical without deformity/swelling cranium, scalp, or facial bones  Eyes - sclera not injected, no discharge noted, no periorbital edema  GI - Appears round and flat, BS +x4  quadrants, +diffuse tenderness w/o guarding with palpation   - no CVA tenderness  Skin - no rashes or petechiae noted, pink warm and dry throughout, mmm, cap refill <2seconds  Neuro - A&O x4, steady gait  MSK - makes purposeful movements of all extremities, radial pulses 2+ bilat.  Psych - Interactive and appropriate      ED Course     Labs Reviewed   POCT CBC - Abnormal; Notable for the following components:       Result Value    WBC IC 12.6 (*)     # Neutrophil 9.3 (*)     # Mixed Cells 1.1 (*)     All other components within normal limits   POCT ISTAT CHEM8 CARTRIDGE - Abnormal; Notable for the following components:    ISTAT Sodium 134 (*)     ISTAT Chloride 96 (*)     ISTAT Hematocrit 51 (*)     ISTAT Glucose 148 (*)     ISTAT Creatinine 1.10 (*)     eGFR-Cr 59 (*)     All other components within normal limits   [facility] POCT URINALYSIS DIPSTICK - Abnormal; Notable for the following components:    Blood, Urine Small (*)     All other components within normal limits   CBC W AUTO DIFF       MDM     CT ABDOMEN+PELVIS(CONTRAST ONLY)(CPT=74177) (Final result)  Result time 08/20/24 15:21:14  Final result by Mitesh Victor MD (08/20/24 15:21:14)                Impression:    CONCLUSION:     Long segment inflammatory changes of the colon at the splenic flexure is seen consistent with colitis.  No small bowel obstruction.  No diverticula are seen to suggest diverticulitis.     Stable chronic mild right hydronephrosis.  Stable or slightly decreased soft tissues at the right lower pole partial nephrectomy site.  No evidence of local recurrence.     Additional chronic or incidental findings are described in the body of this report.           Dictated by (CST): Mitesh Victor MD on 8/20/2024 at 3:12 PM      Finalized by (CST): Mitesh Victor MD on 8/20/2024 at 3:21 PM                  Narrative:    PROCEDURE: CT ABDOMEN + PELVIS (CONTRAST ONLY) (CPT=74177)     COMPARISON: [facility], CT ABDOMEN + PELVIS (ALL  W+WO) (CPT=74178), 7/02/2021, 8:44 AM.  [facility], CT ABDOMEN (CONTRAST ONLY) (CPT=74160), 10/05/2020, 7:46 AM.     INDICATIONS: Lower abd cramping x1wk with blood in stool.     TECHNIQUE: CT images of the abdomen and pelvis were obtained with non-ionic intravenous contrast material.  Automated exposure control for dose reduction was used. Adjustment of the mA and/or kV was done based on the patient's size. Use of iterative  reconstruction technique for dose reduction was used.  Dose information is transmitted to the ACR (American College of Radiology) NRDR (National Radiology Data Registry) which includes the Dose Index Registry.     FINDINGS:  LOWER THORAX: Coronary artery calcifications are not seen.  No visible pulmonary or pleural disease.  LIVER:   There is a punctate hypodensity in the right hepatic lobe measuring 0.7 cm, too small to accurately characterize, but not previously seen.  GALLBLADDER: Surgically absent.  BILIARY:   No visible dilatation or calcification.    PANCREAS:   No lesion, fluid collection, ductal dilatation, or atrophy.    SPLEEN:   No enlargement or focal lesion.    ADRENALS:   No mass or enlargement.    KIDNEYS:   Prior postsurgical changes of right lower pole partial nephrectomy.  The postsurgical soft tissue changes at the lower pole are stable or slightly decreased in size measuring 3.5 x 1.0 cm series 5, image 59, previously 3.7 x 1.1 cm remeasured  with similar technique.  No suspicious renal lesions.  There is mild right hydronephrosis which is similar to the prior exam.  RETROPERITONEUM:   No mass or enlarged adenopathy.    AORTA/VASCULAR:   No aneurysm or dissection.  Mild calcified atherosclerosis.  PERITONEUM: No free fluid or air.  GI TRACT/MESENTERY:   No small bowel obstruction.  There is thickening, hyperenhancement, and mild inflammatory changes at the splenic flexure for instance series 5, image 54, series 6, image 40. Prior appendectomy  changes.  URINARY BLADDER: Unremarkable.  REPRODUCTIVE ORGANS: The uterus is present.  The ovaries are unremarkable.  ABDOMINAL WALL:   No acute abnormality.  Some laxity to the right lower quadrant abdominal wall is noted.  BONES: No acute fracture.  Mild spondylosis.                 DDx: diverticulitis, colitis, gastroenteritis, electrolyte imbalance, anemia.     Pt to IC with some blood in stool, and multiple episodes of diarrhea x6 days. +leukocytosis 12.6. +elevated kidney function with serum Cr 1.1, GFR 59. Na 136 Chloride 96 and hematocrit 51. Changes like d/t dehydration however pt's kidney function typically slightly elevated with Cr ranging from .87 to 1.05 d/t partial nephrectomy. 1L IVF provided. CT as noted above with inflammatory changes consistent with colitis. All results discussed with pt. C-diff already preformed by PCP negative. Additional studies for Cx with shiga toxin and giardia/crypto placed. Discussed we will call pt if results are positive. Otherwise, generalized colitis ins reviewed, bland diet, hydration ins, rest, close f/u with PCP and return/ed precautions reviewed. Patient is historian and demonstrates understanding of all instruction and agrees with plan of care. This case was also discussed with Dr. Bales who agrees w poc.       Disposition and Plan     Clinical Impression:  1. Colitis    2. Diarrhea, unspecified type    3. Lower abdominal pain    4. Elevated serum creatinine        Disposition:  Discharge    Follow-up:  Leonila Maradiaga MD  [address] [place] [id number]  [phone]    Go in 1 week  As needed      Medications Prescribed:  Discharge Medication List as of 8/20/2024  3:58 PM

## 2024-08-20 NOTE — ED QUICK NOTES
Pt transferred to [facility] for outpatient CT with .  Saline lock to R AC wrapped in kerlix.

## 2024-08-20 NOTE — ED INITIAL ASSESSMENT (HPI)
Pt complains of cramping (right mid ab) + boating. Nausea + sweating/chills.  Pt states of and on diarrhea (liquid) (Thursday/Friday red stools) subsided Friday   Only eating yogurt, toast.  5/6 days of feeling cramped up especially when eating.  Recent surgery 1 week ago Monday 8/12 Tuesday constipated relieved Wednesday night with colace. Friday stool sample was sent by doctor.     Denies travel, antibiotics

## 2024-08-20 NOTE — DISCHARGE INSTRUCTIONS
Keep well hydrated with clear fluids. Follow a clear liquid diet for the rest of today. When you are ready to eat, start with soft, bland diet (bananas, rice, crackers, bread, soup, etc). Eat small meals and then advance diet as tolerated over the next few days. Avoid fatty, spicy, greasy, and dairy until symptoms have resolved for >48hrs. Follow up with your primary care doctor in the next 1-2 days for reevaluation. Seek additional care in the emergency department if you have new or worsening symptoms, persistent vomiting/diarrhea, blood in stool, are unable to tolerate fluids, or fever.

## 2024-08-21 ENCOUNTER — LAB ENCOUNTER (OUTPATIENT)
Dept: LAB | Facility: HOSPITAL | Age: 54
End: 2024-08-21
Attending: NURSE PRACTITIONER
Payer: COMMERCIAL

## 2024-08-21 DIAGNOSIS — R19.7 DIARRHEA, UNSPECIFIED TYPE: ICD-10-CM

## 2024-08-21 LAB
CRYPTOSP AG STL QL IA: NEGATIVE
G LAMBLIA AG STL QL IA: NEGATIVE

## 2024-08-21 PROCEDURE — 87272 CRYPTOSPORIDIUM AG IF: CPT | Performed by: NURSE PRACTITIONER

## 2024-08-21 PROCEDURE — 87427 SHIGA-LIKE TOXIN AG IA: CPT | Performed by: NURSE PRACTITIONER

## 2024-08-21 PROCEDURE — 87329 GIARDIA AG IA: CPT | Performed by: NURSE PRACTITIONER

## 2024-08-21 PROCEDURE — 87015 SPECIMEN INFECT AGNT CONCNTJ: CPT | Performed by: NURSE PRACTITIONER

## 2024-08-21 PROCEDURE — 87046 STOOL CULTR AEROBIC BACT EA: CPT | Performed by: NURSE PRACTITIONER

## 2024-08-21 PROCEDURE — 87045 FECES CULTURE AEROBIC BACT: CPT | Performed by: NURSE PRACTITIONER

## 2024-08-21 RX ORDER — SPIRONOLACTONE 100 MG/1
TABLET, FILM COATED ORAL
Qty: 135 TABLET | Refills: 3 | Status: SHIPPED | OUTPATIENT
Start: 2024-08-21

## 2024-08-21 NOTE — TELEPHONE ENCOUNTER
Refill passed per [facility] protocol.    Please review pended refill request as unable to refill due to high/very high interaction warning copied here:    High  Drug-Drug: spironolactone and Candesartan CilexetilThe risk of hyperkalemia may be increased when potassium-sparing diuretics are co-administered with angiotensin II receptor antagonists.  Details        Requested Prescriptions   Pending Prescriptions Disp Refills    SPIRONOLACTONE 100 MG Oral Tab [Pharmacy Med Name: SPIRONOLACTONE TABS 100MG] 135 tablet 3     Sig: TAKE 1 TABLET EVERY MORNING AND ONE-HALF (1/2) TABLET IN THE EVENING       Hypertension Medications Protocol Passed - 8/19/2024 12:24 AM        Passed - CMP or BMP in past 12 months        Passed - Last BP reading less than 140/90     BP Readings from Last 1 Encounters:   08/20/24 116/55               Passed - In person appointment or virtual visit in the past 12 mos or appointment in next 3 mos     Recent Outpatient Visits              1 week ago Chronic kidney disease (CKD) stage G2/A2, mildly decreased glomerular filtration rate (GFR) between 60-89 mL/min/1.73 square meter and albuminuria creatinine ratio between  mg/g    [facility]    [facility]    3 months ago Adult general medical exam    [facility]    [facility]    6 months ago Multinodular goiter    [facility]    [facility] Visit    1 year ago Type 2 diabetes mellitus without complication, without long-term current use of insulin ([facility])    [facility]    [facility] Visit    1 year ago Type 2 diabetes mellitus without complication, without long-term current use of insulin ([facility])    [facility]    [facility]           Future Appointments         Provider Department Appt Notes    In 2 months [facility] ok per                     Passed - EGFRCR or GFRNAA > 50     GFR Evaluation  EGFRCR: 59 , resulted on 8/20/2024             Future Appointments         Provider Department Appt Notes    In 2 months Leonila Maradiaga MD [facility] follow-up on diabetes ok per           Recent Outpatient Visits              1 week ago Chronic kidney disease (CKD) stage G2/A2, mildly decreased glomerular filtration rate (GFR) between 60-89 mL/min/1.73 square meter and albuminuria creatinine ratio between  mg/g    [facility]    Office Visit    3 months ago Adult general medical exam    [facility]    Office Visit    6 months ago Multinodular goiter    [facility]    Office Visit    1 year ago Type 2 diabetes mellitus without complication, without long-term current use of insulin ([facility])    [facility]    Office Visit    1 year ago Type 2 diabetes mellitus without complication, without long-term current use of insulin ([facility])    [facility]    [facility] Visit

## 2024-08-23 ENCOUNTER — PATIENT MESSAGE (OUTPATIENT)
Dept: INTERNAL MEDICINE CLINIC | Facility: CLINIC | Age: 54
End: 2024-08-23

## 2024-08-23 RX ORDER — INSULIN DEGLUDEC 200 U/ML
INJECTION, SOLUTION SUBCUTANEOUS
Qty: 33 ML | Refills: 3 | OUTPATIENT
Start: 2024-08-23

## 2024-08-25 NOTE — TELEPHONE ENCOUNTER
From: Rekha Moreno  To: Leonila Maradiaga  Sent: 8/23/2024 6:02 PM CDT  Subject: Tresiba refill - send to [facility]    Refill requested via [employer] recently, I received a message back today 8/23 that one was sent on Aug 9 so my request was denied.   However, insurance shows two prescriptions received on the 9th and BOTH of them canceled by my Dr. The note I received for the denial said the cancel was only for one of the two duplicate renewals on Aug 9 but insurance shows both canceled. I now have no refill available and am down to 2 pens.     Please send a Tresiba refill to [facility].    Thank you  Rekha

## 2024-09-04 RX ORDER — ATENOLOL 25 MG/1
25 TABLET ORAL NIGHTLY
Qty: 90 TABLET | Refills: 3 | Status: SHIPPED | OUTPATIENT
Start: 2024-09-04

## 2024-09-04 NOTE — TELEPHONE ENCOUNTER
Refill Per Protocol     Requested Prescriptions   Pending Prescriptions Disp Refills    ATENOLOL 25 MG Oral Tab [Pharmacy Med Name: ATENOLOL TABS 25MG] 90 tablet 3     Sig: TAKE 1 TABLET NIGHTLY       Hypertension Medications Protocol Passed - 9/2/2024 12:25 AM        Passed - CMP or BMP in past 12 months        Passed - Last BP reading less than 140/90     BP Readings from Last 1 Encounters:   08/20/24 116/55               Passed - In person appointment or virtual visit in the past 12 mos or appointment in next 3 mos     Recent Outpatient Visits              3 weeks ago Chronic kidney disease (CKD) stage G2/A2, mildly decreased glomerular filtration rate (GFR) between 60-89 mL/min/1.73 square meter and albuminuria creatinine ratio between  mg/g    [facility] Leonila Osuna MD    Office Visit    4 months ago Adult general medical exam    [facility] Leonila Osnua MD    Office Visit    7 months ago Multinodular goiter    [facility]    Office Visit    1 year ago Type 2 diabetes mellitus without complication, without long-term current use of insulin ([facility])    [facility] Leonila Osuna MD    Office Visit    1 year ago Type 2 diabetes mellitus without complication, without long-term current use of insulin ([facility])    [facility] Leonila Osuna MD    Office Visit          Future Appointments         Provider Department Appt Notes    In 2 months Leonila Maradiaga MD [facility], [place], [place] follow-up on diabetes ok per                     Passed - EGFRCR or GFRNAA > 50     GFR Evaluation  EGFRCR: 59 , resulted on 8/20/2024                 Future Appointments         Provider Department Appt Notes    In 2 months Leonila Maradiaga MD [facility] ok per           Recent Outpatient Visits              3 weeks ago Chronic kidney disease (CKD) stage G2/A2, mildly decreased glomerular filtration rate (GFR) between 60-89 mL/min/1.73 square meter and albuminuria creatinine ratio between  mg/g    [facility]    [facility]    4 months ago Adult general medical exam    [facility]    Office Visit    7 months ago Multinodular goiter    [facility]    Office Visit    1 year ago Type 2 diabetes mellitus without complication, without long-term current use of insulin ([facility])    [facility]    Office Visit    1 year ago Type 2 diabetes mellitus without complication, without long-term current use of insulin ([facility])    [facility]    [facility] Visit

## 2024-10-04 ENCOUNTER — TELEPHONE (OUTPATIENT)
Dept: INTERNAL MEDICINE CLINIC | Facility: CLINIC | Age: 54
End: 2024-10-04

## 2024-10-04 ENCOUNTER — PATIENT MESSAGE (OUTPATIENT)
Dept: INTERNAL MEDICINE CLINIC | Facility: CLINIC | Age: 54
End: 2024-10-04

## 2024-10-04 DIAGNOSIS — E11.9 TYPE 2 DIABETES MELLITUS WITHOUT COMPLICATION, WITHOUT LONG-TERM CURRENT USE OF INSULIN (HCC): ICD-10-CM

## 2024-10-04 RX ORDER — SEMAGLUTIDE 2.68 MG/ML
2 INJECTION, SOLUTION SUBCUTANEOUS WEEKLY
Qty: 9 ML | Refills: 3 | Status: CANCELLED | OUTPATIENT
Start: 2024-10-04

## 2024-10-04 NOTE — TELEPHONE ENCOUNTER
Dr Maradiaga-   I received a message from [employer] that the Pre-Authorization letter for Ozempic needs to be renewed. They sent a message to your office with this request.  This morning I received a message from [employer] that the request had been denied and to contact you.     If we plan for me to stay on Ozempic, I need to have the Pre-Authorization letter to [facility] renewed as soon as possible. I have 4 weeks of Ozempic left as of today 10/4.     Would you please have your team process the necessary Pre-Authorization letter?  Many Thanks  Rekha

## 2024-10-04 NOTE — TELEPHONE ENCOUNTER
MESSAGE:   RE:Rekha Moreno  :1970  ID#[id number]  Medication-Ozempic      Plan does not cover this medication. Please call plan () to initiate prior authorization or call/fax/eRx to pharmacy change of medication.   Patients ID#:      - If changing med, please send directly to pharmacy.   - If doing Prior Auth, please route to RN Triage Support.

## 2024-10-04 NOTE — TELEPHONE ENCOUNTER
Jade Dominique    8/15/23  1:44 PM  Note  Message left for patient that PA for Ozempic has been approved from 07/16/2023 to 08/14/2024

## 2024-10-07 NOTE — TELEPHONE ENCOUNTER
Closed   10/7/2024  2:59 PM  Close reason: Other   Note from payer: This request has been approved using information available on the patient's profile. CaseId:[id number];Status:Approved;Review Type:Prior Auth;Coverage Start Date:09/07/2024;Coverage End Date:10/07/2025; - Prescriber details have been updated to match the prescriber directory.

## 2024-10-16 RX ORDER — AMLODIPINE BESYLATE 2.5 MG/1
2.5 TABLET ORAL 2 TIMES DAILY
Qty: 180 TABLET | Refills: 3 | Status: SHIPPED | OUTPATIENT
Start: 2024-10-16

## 2024-10-16 NOTE — TELEPHONE ENCOUNTER
Refill Per Protocol     Requested Prescriptions   Pending Prescriptions Disp Refills    AMLODIPINE 2.5 MG Oral Tab [Pharmacy Med Name: AMLODIPINE BESYLATE TABS 2.5MG] 180 tablet 3     Sig: TAKE 1 TABLET TWICE A DAY       Hypertension Medications Protocol Passed - 10/16/2024  9:39 AM        Passed - CMP or BMP in past 12 months        Passed - Last BP reading less than 140/90     BP Readings from Last 1 Encounters:   08/20/24 116/55               Passed - In person appointment or virtual visit in the past 12 mos or appointment in next 3 mos     Recent Outpatient Visits              2 months ago Chronic kidney disease (CKD) stage G2/A2, mildly decreased glomerular filtration rate (GFR) between 60-89 mL/min/1.73 square meter and albuminuria creatinine ratio between  mg/g    [facility]    [facility]    5 months ago Adult general medical exam    [facility]    Office Visit    8 months ago Multinodular goiter    [facility]    Office Visit    1 year ago Type 2 diabetes mellitus without complication, without long-term current use of insulin ([facility])    [facility]    Office Visit    1 year ago Type 2 diabetes mellitus without complication, without long-term current use of insulin ([facility])    [facility]    [facility]          Future Appointments         Provider Department Appt Notes    In 3 weeks Leonila Maradiaga MD [facility], [place], [place] follow-up on diabetes ok per                     Passed - EGFRCR or GFRNAA > 50     GFR Evaluation  EGFRCR: 59 , resulted on 8/20/2024                 Future Appointments         Provider Department Appt Notes    In 3 weeks Hiren  Leonila HERNANDEZ MD [facility] ok per           Recent Outpatient Visits              2 months ago Chronic kidney disease (CKD) stage G2/A2, mildly decreased glomerular filtration rate (GFR) between 60-89 mL/min/1.73 square meter and albuminuria creatinine ratio between  mg/g    [facility], Leonila Osuna MD    Office Visit    5 months ago Adult general medical exam    [facility]    Office Visit    8 months ago Multinodular goiter    [facility]    Office Visit    1 year ago Type 2 diabetes mellitus without complication, without long-term current use of insulin ([facility])    [facility]    Office Visit    1 year ago Type 2 diabetes mellitus without complication, without long-term current use of insulin ([facility])    [facility]    [facility] Visit

## 2024-11-08 ENCOUNTER — OFFICE VISIT (OUTPATIENT)
Dept: INTERNAL MEDICINE CLINIC | Facility: CLINIC | Age: 54
End: 2024-11-08
Payer: COMMERCIAL

## 2024-11-08 VITALS
HEIGHT: 66 IN | WEIGHT: 256 LBS | BODY MASS INDEX: 41.14 KG/M2 | OXYGEN SATURATION: 100 % | DIASTOLIC BLOOD PRESSURE: 70 MMHG | TEMPERATURE: 98 F | HEART RATE: 85 BPM | SYSTOLIC BLOOD PRESSURE: 115 MMHG

## 2024-11-08 DIAGNOSIS — E11.9 TYPE 2 DIABETES MELLITUS WITHOUT COMPLICATION, WITHOUT LONG-TERM CURRENT USE OF INSULIN (HCC): ICD-10-CM

## 2024-11-08 DIAGNOSIS — M54.31 SCIATICA OF RIGHT SIDE: ICD-10-CM

## 2024-11-08 DIAGNOSIS — E55.9 VITAMIN D DEFICIENCY: ICD-10-CM

## 2024-11-08 DIAGNOSIS — E03.9 ACQUIRED HYPOTHYROIDISM: Primary | ICD-10-CM

## 2024-11-08 DIAGNOSIS — Z71.85 VACCINE COUNSELING: ICD-10-CM

## 2024-11-08 DIAGNOSIS — Z12.83 SKIN EXAM, SCREENING FOR CANCER: ICD-10-CM

## 2024-11-08 DIAGNOSIS — I10 PRIMARY HYPERTENSION: ICD-10-CM

## 2024-11-08 PROCEDURE — 3074F SYST BP LT 130 MM HG: CPT | Performed by: INTERNAL MEDICINE

## 2024-11-08 PROCEDURE — 99214 OFFICE O/P EST MOD 30 MIN: CPT | Performed by: INTERNAL MEDICINE

## 2024-11-08 PROCEDURE — 3078F DIAST BP <80 MM HG: CPT | Performed by: INTERNAL MEDICINE

## 2024-11-08 PROCEDURE — 3008F BODY MASS INDEX DOCD: CPT | Performed by: INTERNAL MEDICINE

## 2024-11-08 RX ORDER — GABAPENTIN 300 MG/1
CAPSULE ORAL
Qty: 270 CAPSULE | Refills: 3 | Status: SHIPPED | OUTPATIENT
Start: 2024-11-08

## 2024-11-08 NOTE — TELEPHONE ENCOUNTER
Continue tradjenta 5 qday (they are covering only qday, see prior message) and add amaryl 2 q12 hrs. Pre meals. PAST SURGICAL HISTORY:  S/P appendectomy

## 2024-11-08 NOTE — PATIENT INSTRUCTIONS
ASSESSMENT/PLAN:     Encounter Diagnoses   Name Primary?    Acquired hypothyroidism Check blood.    Yes    Primary hypertension Stable. Careful with diet and excercise at least 30 minutes 3-4 times a week. Check blood pressures at different times on different days. Can purchase own blood pressure monitor. If not, check at local pharmacy. Bake foods more and grill occasionally. Avoid fried foods. No salt. Use other seasonings.         Type 2 diabetes mellitus without complication, without long-term current use of insulin (HCC) Higher. Increase insulin.  Call in 1 week with sugars.  FU endoc. Careful with diet and excercise at least 30 minutes 3-4 times a week. Check sugars at different times on different dates. Careful with low sugars. Carry something with you and check sugar if can. Can carry aristides cracker, etc. Decrease carbohydrates. But also, careful with fruits and natural sugars.One serving a day and no more than 1 handful every day. Check feet  every AM and careful with sores and ulcers on feet bilaterally. Check eyes every year with dilated eye exam.  Check sugars.  2-hour postmeal should be less than 140s.  Pre-meal should be 's.  Both equally affected A1c.  Discussed importance of glycemic control to prevent complications of diabetes  -Discussed complications of diabetes include retinopathy, neuropathy, nephropathy and cardiovascular disease  -Discussed ABCs of DM  -Discussed importance of SBGM  -Discussed importance of low CHO diet, recommend 45gm per meal or 135gm per day  -FU with optho  -Normotensive        Vaccine counseling Up to date.        Vitamin D deficiency Check blood.        Sciatica of right side DW pt. of options.  Increase gabapentin.         Orders Placed This Encounter   Procedures    Vitamin D    TSH W Reflex To Free T4    Free T4, (Free Thyroxine)    Hemoglobin A1C       Meds This Visit:  Requested Prescriptions     Signed Prescriptions Disp Refills    insulin degludec 200  UNIT/ML Subcutaneous Solution Pen-injector 39 mL 3     Si units qam and 40 units qpm.    gabapentin 300 MG Oral Cap 270 capsule 3     Si mg in AM and 600 mg at night.       Imaging & Referrals:  ENDOCRINOLOGY - INTERNAL  OPHTHALMOLOGY - INTERNAL  OPHTHALMOLOGY - INTERNAL  DERM - INTERNAL      RTC 3 months for FU.   RTC after 5-2-25 physical.

## 2024-11-08 NOTE — PROGRESS NOTES
HPI:    Patient ID: Rekha Moreno is a 53 year old female.    Chief Complaint   Patient presents with    Diabetes     Patient states she is her for a follow up on diabetes.       Has ashleigh't with hip pain in 2 hrs. Spine Sx. 12-27-24.     Patient here for follow up of Diabetes.  Has been taking medications regularly.    Checks sugars 1 times daily.2 weeks ago, cortisone.   Fasting sugars average 180's. 2 hrs. : PP: 300's. No lows.   Watches diabetic diet, low salt.  Diabetic Flow sheet      11/8/2024     9:29 AM 11/8/2024     8:36 AM 8/20/2024     3:58 PM 8/20/2024     2:10 PM 8/20/2024    12:14 PM 5/22/2024    12:00 AM   DIABETIC FLOWSHEET (Formerly [facility])   BMI  41.32 kg/m2       Candesartan Cilexetil 8 mg BID OR    8 mg BID OR     Admitted Admitted Admitted 8 mg BID OR       Weight (enc vitals)  256 lb       BP (enc vitals)  115/70  116/55 117/55        Medication marked as long-term      HISTORY OF DIABETES COMPLICATIONS: :  History of Retinopathy: No.   History of Neuropathy: No.   History of Nephropathy: Yes.      ASSOCIATED COMPLICATIONS:   HTN: Yes.   Hyperlipidemia: Yes.   Coronary Artery Disease:  CAD,  HF, PAD  Cerebrovascular Disease: No.      MEALS:  2-3 meals a day  Cooks at home    Hypertension  Patient is here for follow up of hypertension. BP at home: not check.   Has been compliant with medications.  Exercise level: trying to do more (back to gym. Not intense. Aversion to swimming. Treadmill X Nl walking pace X 20 minutes and bike X 20 minutes and 2 times a week) and has been following low salt diet.  Weight has been down.   Wt Readings from Last 3 Encounters:   11/08/24 256 lb (116.1 kg)   08/08/24 264 lb (119.7 kg)   05/02/24 259 lb (117.5 kg)     BP Readings from Last 3 Encounters:   11/08/24 115/70   08/20/24 116/55   08/08/24 110/70     Labs:   Lab Results   Component Value Date/Time     (H) 02/09/2024 09:04 AM     02/09/2024 09:04 AM    K 4.6 02/09/2024 09:04 AM     02/09/2024  09:04 AM    CO2 27.0 02/09/2024 09:04 AM    CREATSERUM 0.97 07/23/2024 08:39 AM    CA 9.7 02/09/2024 09:04 AM    AST 42 (H) 02/09/2024 09:04 AM    ALT 63 (H) 02/09/2024 09:04 AM    TSH 1.712 02/09/2024 09:04 AM    T4F 1.1 02/09/2024 09:04 AM        Lab Results   Component Value Date/Time    CHOLEST 104 02/09/2024 09:04 AM    HDL 47 02/09/2024 09:04 AM    TRIG 74 02/09/2024 09:04 AM    LDL 42 02/09/2024 09:04 AM    NONHDLC 57 02/09/2024 09:04 AM            Wt Readings from Last 3 Encounters:   11/08/24 256 lb (116.1 kg)   08/08/24 264 lb (119.7 kg)   05/02/24 259 lb (117.5 kg)     BP Readings from Last 3 Encounters:   11/08/24 115/70   08/20/24 116/55   08/08/24 110/70     Labs:   Lab Results   Component Value Date/Time     (H) 02/09/2024 09:04 AM     02/09/2024 09:04 AM    K 4.6 02/09/2024 09:04 AM     02/09/2024 09:04 AM    CO2 27.0 02/09/2024 09:04 AM    CREATSERUM 0.97 07/23/2024 08:39 AM    CA 9.7 02/09/2024 09:04 AM    AST 42 (H) 02/09/2024 09:04 AM    ALT 63 (H) 02/09/2024 09:04 AM    TSH 1.712 02/09/2024 09:04 AM    T4F 1.1 02/09/2024 09:04 AM        Lab Results   Component Value Date/Time    CHOLEST 104 02/09/2024 09:04 AM    HDL 47 02/09/2024 09:04 AM    TRIG 74 02/09/2024 09:04 AM    LDL 42 02/09/2024 09:04 AM    NONHDLC 57 02/09/2024 09:04 AM          Hip is better. LBP since 2-24. Could not  2-24. Still with pain daily.  Started looking at different jobs.  Has a 12-month severance pay from previous job.    Diabetes  Pertinent negatives for hypoglycemia include no dizziness, headaches, seizures, speech difficulty or tremors. Pertinent negatives for diabetes include no chest pain, no fatigue, no polydipsia, no polyphagia, no polyuria and no weakness.         Review of Systems   Constitutional:  Negative for activity change, appetite change, chills, diaphoresis, fatigue, fever and unexpected weight change.   Respiratory:  Negative for apnea, cough, choking, chest tightness,  shortness of breath, wheezing and stridor.    Cardiovascular:  Negative for chest pain, palpitations and leg swelling.   Gastrointestinal:  Negative for abdominal distention and abdominal pain.   Endocrine: Negative for cold intolerance, heat intolerance, polydipsia, polyphagia and polyuria.   Neurological:  Negative for dizziness, tremors, seizures, syncope, facial asymmetry, speech difficulty, weakness, light-headedness, numbness and headaches.   All other systems reviewed and are negative.        Current Outpatient Medications   Medication Sig Dispense Refill    insulin degludec 200 UNIT/ML Subcutaneous Solution Pen-injector 50 units qam and 40 units qpm. 39 mL 3    gabapentin 300 MG Oral Cap 300 mg in AM and 600 mg at night. 270 capsule 3    amLODIPine 2.5 MG Oral Tab Take 1 tablet (2.5 mg total) by mouth 2 (two) times daily. 180 tablet 3    atenolol 25 MG Oral Tab Take 1 tablet (25 mg total) by mouth nightly. 90 tablet 3    spironolactone 100 MG Oral Tab TAKE 1 TABLET EVERY MORNING AND ONE-HALF (1/2) TABLET IN THE EVENING 135 tablet 3    Insulin Pen Needle (BD PEN NEEDLE LINA 2ND GEN) 32G X 4 MM Does not apply Misc Inject 1 Needle into the skin As Directed. 90 each 3    Candesartan Cilexetil 8 MG Oral Tab Take 1 tablet (8 mg total) by mouth 2 (two) times daily. 180 tablet 3    Continuous Glucose Sensor (FREESTYLE MARCELLE 3 SENSOR) Does not apply Misc 1 each every 14 (fourteen) days. 24 each 3    levothyroxine 100 MCG Oral Tab TAKE 1 TABLET BEFORE BREAKFAST 90 tablet 3    semaglutide (OZEMPIC, 2 MG/DOSE,) 8 MG/3ML Subcutaneous Solution Pen-injector Inject 2 mg into the skin once a week. 9 mL 3    rosuvastatin 5 MG Oral Tab Take 1 tablet (5 mg total) by mouth nightly.      zolpidem (AMBIEN) 5 MG Oral Tab Take 1 tablet (5 mg total) by mouth nightly as needed for Sleep. 15 tablet 0    KRILL OIL OR Take 1 capsule by mouth daily.      Calcium Carbonate-Vitamin D (CALCIUM + D OR) Take by mouth.      famotidine 10 MG  Oral Tab Take 1 tablet (10 mg total) by mouth 2 (two) times daily. 60 tablet 1    Multiple Vitamins-Minerals (MULTIVITAMIN OR) Take by mouth daily.        Olopatadine HCl 0.1 % Ophthalmic Solution Place 1 drop into both eyes 2 (two) times daily as needed.       Allergies:Allergies[1]    HISTORY:  Past Medical History:    Calculus of kidney    Cancer (HCC)    kidney    Diabetes (HCC)    Disorder of thyroid    Epicondylitis, lateral    B/L. [facility].     Essential hypertension    High blood pressure    PONV (postoperative nausea and vomiting)    difficulty arousing       Past Surgical History:   Procedure Laterality Date    Appendectomy  2008    Arthroscopy of joint unlisted Left 1-4-16    Labral tear reapir, iliopsoas burscetomy. (hip)    Arthroscopy of joint unlisted Right     bone spur and torn cartilage shoulder    Carpal tunnel release Right 2014    Carpal tunnel release Left 01/2019    [facility].    Cholecystectomy      Colonoscopy  05/2020    normal    Correct bunion,othr methods Right     Foot surgery Right     torn ligament repaired    Nephrectomy Right 10/01/2018    Partial nephrectomy    Other Right 2005    arthroscopy bone spur removal     Other Left 01/2019    Status post Left lateral epicondylar release.  Done by [facility].    Other Right     toe ligament repair      Family History   Problem Relation Age of Onset    Diabetes Father     Arthritis Father     Hypertension Father     Heart Disease Father 75        aortic valve disease    Cancer Father         Skin cancer.     Pacemaker Father     Arthritis Mother     Cancer Mother         lung, skin and MDS    Hypertension Mother     Heart Disorder Mother 60        CAD S/P MI.     Cancer Brother         Hodgkin's    Hypertension Brother     Cancer Maternal Grandmother         Hepatoma.     Cancer Paternal Uncle         Stomach.       Social History:   Social History     Socioeconomic History    Marital status:      Number of children: 0   Occupational History    Occupation: Finance.    Tobacco Use    Smoking status: Never    Smokeless tobacco: Never   Vaping Use    Vaping status: Never Used   Substance and Sexual Activity    Alcohol use: Yes     Alcohol/week: 0.0 standard drinks of alcohol     Comment: occassionally     Drug use: No    Sexual activity: Yes     Partners: Male     Birth control/protection: Pill   Other Topics Concern    Caffeine Concern Yes     Comment: 5 8oz of coffee and soda daily    Exercise No   Social History Narrative    The patient does not use an assistive device..      The patient does live in a home with stairs.        PHYSICAL EXAM:   /70 (BP Location: Left arm, Patient Position: Sitting, Cuff Size: large)   Pulse 85   Temp 98.1 °F (36.7 °C) (Oral)   Ht 5' 6\" (1.676 m)   Wt 256 lb (116.1 kg)   SpO2 100%   BMI 41.32 kg/m²   BP Readings from Last 3 Encounters:   11/08/24 115/70   08/20/24 116/55   08/08/24 110/70     Wt Readings from Last 3 Encounters:   11/08/24 256 lb (116.1 kg)   08/08/24 264 lb (119.7 kg)   05/02/24 259 lb (117.5 kg)       Physical Exam  Vitals and nursing note reviewed.   Constitutional:       General: She is not in acute distress.     Appearance: Normal appearance. She is well-developed. She is not ill-appearing, toxic-appearing or diaphoretic.      Interventions: She is not intubated.  Neck:      Thyroid: No thyroid mass or thyromegaly.      Trachea: Trachea and phonation normal.   Cardiovascular:      Rate and Rhythm: Normal rate and regular rhythm.      Pulses: Normal pulses. No decreased pulses.           Carotid pulses are 2+ on the right side and 2+ on the left side.       Radial pulses are 2+ on the right side and 2+ on the left side.        Dorsalis pedis pulses are 2+ on the right side and 2+ on the left side.        Posterior tibial pulses are 2+ on the right side and 2+ on the left side.      Heart sounds: Normal heart sounds, S1 normal and S2 normal.    Pulmonary:      Effort: Pulmonary effort is normal. No tachypnea, bradypnea, accessory muscle usage, prolonged expiration, respiratory distress or retractions. She is not intubated.      Breath sounds: Normal breath sounds and air entry. No stridor, decreased air movement or transmitted upper airway sounds. No decreased breath sounds, wheezing, rhonchi or rales.   Chest:      Chest wall: No tenderness.   Abdominal:      General: There is no distension.      Palpations: Abdomen is soft.      Tenderness: There is no abdominal tenderness.   Musculoskeletal:      Lumbar back: Spasms present. No swelling, edema, deformity, signs of trauma, lacerations, tenderness or bony tenderness. Decreased range of motion. Positive right straight leg raise test and positive left straight leg raise test (Positive cross leg SLR to R side.). No scoliosis.      Right hip: Tenderness and bony tenderness present. No deformity, lacerations or crepitus. Decreased range of motion. Normal strength.      Left hip: No deformity, lacerations, tenderness, bony tenderness or crepitus. Normal range of motion. Normal strength.      Right lower leg: No edema.      Left lower leg: No edema.   Skin:     General: Skin is warm and dry.   Neurological:      Mental Status: She is alert and oriented to person, place, and time.      Motor: No weakness, tremor or atrophy.      Comments: 5 out of 5 muscle strength bilateral lower extremities.   Psychiatric:         Behavior: Behavior normal. Behavior is cooperative.         Thought Content: Thought content normal.              ASSESSMENT/PLAN:     Encounter Diagnoses   Name Primary?    Acquired hypothyroidism Check blood.    Yes    Primary hypertension Stable. Careful with diet and excercise at least 30 minutes 3-4 times a week. Check blood pressures at different times on different days. Can purchase own blood pressure monitor. If not, check at local pharmacy. Bake foods more and grill occasionally. Avoid  fried foods. No salt. Use other seasonings.         Type 2 diabetes mellitus without complication, without long-term current use of insulin (HCC) Higher. Increase insulin.  Call in 1 week with sugars.  FU endoc. Careful with diet and excercise at least 30 minutes 3-4 times a week. Check sugars at different times on different dates. Careful with low sugars. Carry something with you and check sugar if can. Can carry aristides cracker, etc. Decrease carbohydrates. But also, careful with fruits and natural sugars.One serving a day and no more than 1 handful every day. Check feet  every AM and careful with sores and ulcers on feet bilaterally. Check eyes every year with dilated eye exam.  Check sugars.  2-hour postmeal should be less than 140s.  Pre-meal should be 's.  Both equally affected A1c.  Discussed importance of glycemic control to prevent complications of diabetes  -Discussed complications of diabetes include retinopathy, neuropathy, nephropathy and cardiovascular disease  -Discussed ABCs of DM  -Discussed importance of SBGM  -Discussed importance of low CHO diet, recommend 45gm per meal or 135gm per day  -FU with optho  -Normotensive        Vaccine counseling Up to date.        Vitamin D deficiency Check blood.        Sciatica of right side DW pt. of options.  Increase gabapentin.         Orders Placed This Encounter   Procedures    Vitamin D    TSH W Reflex To Free T4    Free T4, (Free Thyroxine)    Hemoglobin A1C       Meds This Visit:  Requested Prescriptions     Signed Prescriptions Disp Refills    insulin degludec 200 UNIT/ML Subcutaneous Solution Pen-injector 39 mL 3     Si units qam and 40 units qpm.    gabapentin 300 MG Oral Cap 270 capsule 3     Si mg in AM and 600 mg at night.       Imaging & Referrals:  ENDOCRINOLOGY - INTERNAL  OPHTHALMOLOGY - INTERNAL  OPHTHALMOLOGY - INTERNAL  DERM - INTERNAL      RTC 3 months for FU.   RTC after 5-2-25 physical.          [1]   Allergies  Allergen  Reactions    Lisinopril Coughing    Metformin DIARRHEA

## 2024-11-15 ENCOUNTER — TELEPHONE (OUTPATIENT)
Dept: SURGERY | Facility: CLINIC | Age: 54
End: 2024-11-15

## 2024-11-15 NOTE — TELEPHONE ENCOUNTER
Patient dropped off imaging disc from [facility] to the [place] [facility] for Dr. Nicolas to review.    04/25/2024 - MR Lumbar Spine WO Contrast    Films uploaded to PACS.  Disc will be returned to the patient at her appointment with Dr. Nicolas on 11/19/2024.

## 2024-11-18 NOTE — PATIENT INSTRUCTIONS
Refill policies:    Allow 2-3 business days for refills; controlled substances may take longer.  Contact your pharmacy at least 5 days prior to running out of medication and have them send an electronic request or submit request through the “request refill” option in your [facility] account.  Refills are not addressed on weekends; covering physicians do not authorize routine medications on weekends.  No narcotics or controlled substances are refilled after noon on Fridays or by on call physicians.  By law, narcotics must be electronically prescribed.  A 30 day supply with no refills is the maximum allowed.  If your prescription is due for a refill, you may be due for a follow up appointment.  To best provide you care, patients receiving routine medications need to be seen at least once a year.  Patients receiving narcotic/controlled substance medications need to be seen at least once every 3 months.  In the event that your preferred pharmacy does not have the requested medication in stock (e.g. Backordered), it is your responsibility to find another pharmacy that has the requested medication available.  We will gladly send a new prescription to that pharmacy at your request.    Scheduling Tests:    If your physician has ordered radiology tests such as MRI or CT scans, please contact Central Scheduling at [phone] right away to schedule the test.  Once scheduled, the [facility] Centralized Referral Team will work with your insurance carrier to obtain pre-certification or prior authorization.  Depending on your insurance carrier, approval may take 3-10 days.  It is highly recommended patients assure they have received an authorization before having a test performed.  If test is done without insurance authorization, patient may be responsible for the entire amount billed.      Precertification and Prior Authorizations:  If your physician has recommended that you have a procedure or additional testing performed the [facility]  Centralized Referral Team will contact your insurance carrier to obtain pre-certification or prior authorization.    You are strongly encouraged to contact your insurance carrier to verify that your procedure/test has been approved and is a COVERED benefit.  Although the [facility] Centralized Referral Team does its due diligence, the insurance carrier gives the disclaimer that \"Although the procedure is authorized, this does not guarantee payment.\"    Ultimately the patient is responsible for payment.   Thank you for your understanding in this matter.  Paperwork Completion:  If you require FMLA or disability paperwork for your recovery, please make sure to either drop it off or have it faxed to our office at [phone]. Be sure the form has your name and date of birth on it.  The form will be faxed to our Forms Department and they will complete it for you.  There is a 25$ fee for all forms that need to be filled out.  Please be aware there is a 10-14 day turnaround time.  You will need to sign a release of information (KATHIE) form if your paperwork does not come with one.  You may call the Forms Department with any questions at [phone].  Their fax number is [phone].

## 2024-11-19 ENCOUNTER — OFFICE VISIT (OUTPATIENT)
Dept: SURGERY | Facility: CLINIC | Age: 54
End: 2024-11-19
Payer: COMMERCIAL

## 2024-11-19 VITALS — BODY MASS INDEX: 40.18 KG/M2 | HEIGHT: 66 IN | WEIGHT: 250 LBS

## 2024-11-19 DIAGNOSIS — M47.27 LUMBOSACRAL SPONDYLOSIS WITH RADICULOPATHY: Primary | ICD-10-CM

## 2024-11-19 PROCEDURE — 3008F BODY MASS INDEX DOCD: CPT | Performed by: NEUROLOGICAL SURGERY

## 2024-11-19 PROCEDURE — 99204 OFFICE O/P NEW MOD 45 MIN: CPT | Performed by: NEUROLOGICAL SURGERY

## 2024-11-19 NOTE — PROGRESS NOTES
New patient:  Reason for visit: New patient presents to clinic complaining of a sharp, stabbing pain at the top of the right buttock. The pain is constant and at times it radiates down the right foot with a tingling sensation. Patient states pain is there with sitting, walking, or standing.     Referred: Joshua Kruger at [facility]     Estimated time of onset:  Feb. 2024 after her ankle surgery     Numeric Rating Scale:      Pain at Present:  4/10                                                                                                                       Distribution of Pain:    Right     Past Treatments for Current Pain Condition:    Surgery: Unknown   Physical Therapy: 6 weeks and some relief  Injections: 5 Cortisone injections - no relief   Medications: Gabapentin 300 mg in the morning and 600 mg at night     Prior diagnostic testing related to this condition:  MRI Lumbar Spine WO Contrast completed on 04/25/2024.

## 2024-11-19 NOTE — H&P
NORBERT JUAN M.D., F.A.A.N.S.     of Neurosurgery  [facility]  Board Certified Neurosurgeon                              [facility]        [place] [facility]      [address]  [address]  [place] [address]    PHONE  [phone]          FAX  [phone]    [email or web address]    [facility], [facility], [place]     OFFICE CONSULTATION          Rekha Moreno  : 1970   MRN: [id number]    PCP: Leonila Maradiaga MD  Referring Provider: No ref. provider found     Insurance: Payor: [facility] PPO / Plan: BLUE CROSS OUT OF STATE / Product Type: PPO /            Date of Consult:  2024    Reason for Consultation:  Our patient has been referred to our office for evaluation of: Right lower extremity pain      History of Present Illness:  Rekha Moreno is a a(n) right-handed, 53 year old, female.  This is a pleasant lady with history of recent right ankle and right hip surgery that has been dealing with pain radiating through her right buttock into the right lateral aspect and the heel of her foot.  She followed up with her orthopedic surgeon who evaluated her hip and eventually ordered an MRI of the lumbar spine.  Imaging demonstrated lumbosacral degenerative disease and she underwent physical therapy and 5 epidural steroid injections without any significant improvement.  She denies any ongoing persistent left lower extremity symptoms.  She denies any focal weakness and she denies any acute bowel or bladder changes.  However, the right lower extremity pain is affecting her activities of daily living and her quality of life.        History:  Past Medical History:    Calculus of kidney    Cancer (HCC)    kidney    Diabetes (HCC)    Disorder of thyroid    Epicondylitis, lateral    B/L. [facility].     Essential hypertension    High blood pressure    PONV  (postoperative nausea and vomiting)    difficulty arousing       Past Surgical History:   Procedure Laterality Date    Appendectomy  2008    Arthroscopy of joint unlisted Left 1-4-16    Labral tear reapir, iliopsoas burscetomy. (hip)    Arthroscopy of joint unlisted Right     bone spur and torn cartilage shoulder    Carpal tunnel release Right 2014    Carpal tunnel release Left 01/2019    [facility].    Cholecystectomy      Colonoscopy  05/2020    normal    Correct bunion,othr methods Right     Foot surgery Right     torn ligament repaired    Nephrectomy Right 10/01/2018    Partial nephrectomy    Other Right 2005    arthroscopy bone spur removal     Other Left 01/2019    Status post Left lateral epicondylar release.  Done by [facility].    Other Right     toe ligament repair     Family History   Problem Relation Age of Onset    Diabetes Father     Arthritis Father     Hypertension Father     Heart Disease Father 75        aortic valve disease    Cancer Father         Skin cancer.     Pacemaker Father     Arthritis Mother     Cancer Mother         lung, skin and MDS    Hypertension Mother     Heart Disorder Mother 60        CAD S/P MI.     Cancer Brother         Hodgkin's    Hypertension Brother     Cancer Maternal Grandmother         Hepatoma.     Cancer Paternal Uncle         Stomach.       Social History     Socioeconomic History    Marital status:      Spouse name: Not on file    Number of children: 0    Years of education: Not on file    Highest education level: Not on file   Occupational History    Occupation: Finance.    Tobacco Use    Smoking status: Never    Smokeless tobacco: Never   Vaping Use    Vaping status: Never Used   Substance and Sexual Activity    Alcohol use: Yes     Alcohol/week: 0.0 standard drinks of alcohol     Comment: occassionally     Drug use: No    Sexual activity: Yes     Partners: Male     Birth control/protection: Pill   Other Topics Concern      Service Not Asked    Blood Transfusions Not Asked    Caffeine Concern Yes     Comment: 5 8oz of coffee and soda daily    Occupational Exposure Not Asked    Hobby Hazards Not Asked    Sleep Concern Not Asked    Stress Concern Not Asked    Weight Concern Not Asked    Special Diet Not Asked    Back Care Not Asked    Exercise No    Bike Helmet Not Asked    Seat Belt Not Asked    Self-Exams Not Asked   Social History Narrative    The patient does not use an assistive device..      The patient does live in a home with stairs.     Social Drivers of Health     Financial Resource Strain: Not on file   Food Insecurity: Not on file   Transportation Needs: Not on file   Physical Activity: Not on file   Stress: Not on file   Social Connections: Not on file   Housing Stability: Not on file        Allergies:  Allergies[1]    Medications:  Current Outpatient Medications   Medication Sig Dispense Refill    insulin degludec 200 UNIT/ML Subcutaneous Solution Pen-injector 50 units qam and 40 units qpm. 39 mL 3    gabapentin 300 MG Oral Cap 300 mg in AM and 600 mg at night. 270 capsule 3    amLODIPine 2.5 MG Oral Tab Take 1 tablet (2.5 mg total) by mouth 2 (two) times daily. 180 tablet 3    atenolol 25 MG Oral Tab Take 1 tablet (25 mg total) by mouth nightly. 90 tablet 3    spironolactone 100 MG Oral Tab TAKE 1 TABLET EVERY MORNING AND ONE-HALF (1/2) TABLET IN THE EVENING 135 tablet 3    Insulin Pen Needle (BD PEN NEEDLE LINA 2ND GEN) 32G X 4 MM Does not apply Misc Inject 1 Needle into the skin As Directed. 90 each 3    Candesartan Cilexetil 8 MG Oral Tab Take 1 tablet (8 mg total) by mouth 2 (two) times daily. 180 tablet 3    Continuous Glucose Sensor (FREESTYLE MARCELLE 3 SENSOR) Does not apply Misc 1 each every 14 (fourteen) days. 24 each 3    levothyroxine 100 MCG Oral Tab TAKE 1 TABLET BEFORE BREAKFAST 90 tablet 3    semaglutide (OZEMPIC, 2 MG/DOSE,) 8 MG/3ML Subcutaneous Solution Pen-injector Inject 2 mg into the skin once a week. 9  mL 3    rosuvastatin 5 MG Oral Tab Take 1 tablet (5 mg total) by mouth nightly.      zolpidem (AMBIEN) 5 MG Oral Tab Take 1 tablet (5 mg total) by mouth nightly as needed for Sleep. 15 tablet 0    KRILL OIL OR Take 1 capsule by mouth daily.      Calcium Carbonate-Vitamin D (CALCIUM + D OR) Take by mouth.      famotidine 10 MG Oral Tab Take 1 tablet (10 mg total) by mouth 2 (two) times daily. 60 tablet 1    Multiple Vitamins-Minerals (MULTIVITAMIN OR) Take by mouth daily.        Olopatadine HCl 0.1 % Ophthalmic Solution Place 1 drop into both eyes 2 (two) times daily as needed.          Review of Systems:  A 10-point system was reviewed.  Pertinent positives and negatives are noted in HPI.      Physical Exam:  Ht 66\"   Wt 250 lb (113.4 kg)   BMI 40.35 kg/m²        Neurological Exam:    Motor Strength:  5/5 AMG and symmetric    Sensation to light touch:  Intact and symmetric in lower extremities    DTRs:  Absent right Achilles tendon reflex relative to plus 1 out of 4 in the left    Long tract signs:  Negative haines  Negative babinski  Negative clonus      Abdomen:  Soft, non-distended, non-tender, with no rebound or guarding.  No peritoneal signs.     Extremities:  Non-tender, no lower extremity edema noted.      Labs:  CBC:  Lab Results   Component Value Date    WBC 13.1 (H) 08/20/2024    HGB 15.6 08/20/2024    HEMOGLOBIN 15.5 08/20/2024    HCT 46.5 08/20/2024    MCV 91.9 08/20/2024    MCV 92.3 08/20/2024    .0 08/20/2024      BMG:   Lab Results   Component Value Date     02/09/2024    K 4.6 02/09/2024    CO2 27.0 02/09/2024     02/09/2024    BUN 16 02/09/2024      INR:   Lab Results   Component Value Date    INR 0.99 12/17/2023    INR 1.01 11/27/2019    INR 1.0 12/15/2015        Diagnostics:  I reviewed an MRI of the lumbar spine with evidence of proper alignment of the lumbar vertebral bodies.  There is some mild loss of regional lumbar lordosis and loss of disc height at the lumbosacral  junction.  There is prolapse of the disc without martin herniation but significant right lateral recess stenosis which radiographically and traps the passing S1 nerve root.     Diagnosis:  1. Lumbosacral spondylosis with radiculopathy        Assessment/Plan:  Rekha Moreno is a a(n) 53 year old, female, presents with an ongoing right S1 radiculopathy associated with degenerative disc disease of the lumbosacral junction.  She is attempted conservative and less invasive treatment modalities without any significant success.  At this point in time, she is a candidate for an outpatient right medial facetectomy and laminectomy at the L5-S1 junction to decompress the S1 nerve root and treat the radiculopathy.  I discussed indications and details of the procedure and reviewed benefits and risks.  She will decide if she wishes to proceed that at which time point and let us know by phone.    All questions and concerns were addressed. We appreciate the opportunity to participate in the care of this patient. Please do not hesitate to call our office ([phone]) with any issues.   This report will be submitted to the referring provider.          Adan Nicolas M.D., F.A.A.N.S.    11/19/2024  1:52 PM    This note has been dictated utilizing voice recognition software. Unfortunately, this may lead to occasional typographical errors. If there are any questions regarding this, please do not hesitate to contact our office.        [1]   Allergies  Allergen Reactions    Lisinopril Coughing    Metformin DIARRHEA

## 2024-12-06 ENCOUNTER — PATIENT MESSAGE (OUTPATIENT)
Dept: INTERNAL MEDICINE CLINIC | Facility: CLINIC | Age: 54
End: 2024-12-06

## 2024-12-08 RX ORDER — ROSUVASTATIN CALCIUM 5 MG/1
5 TABLET, COATED ORAL NIGHTLY
Qty: 90 TABLET | Refills: 3 | Status: SHIPPED | OUTPATIENT
Start: 2024-12-08

## 2024-12-23 ENCOUNTER — LAB ENCOUNTER (OUTPATIENT)
Dept: LAB | Facility: HOSPITAL | Age: 54
End: 2024-12-23
Attending: INTERNAL MEDICINE
Payer: COMMERCIAL

## 2024-12-23 DIAGNOSIS — E11.9 TYPE 2 DIABETES MELLITUS WITHOUT COMPLICATION, WITHOUT LONG-TERM CURRENT USE OF INSULIN (HCC): ICD-10-CM

## 2024-12-23 DIAGNOSIS — E03.9 ACQUIRED HYPOTHYROIDISM: ICD-10-CM

## 2024-12-23 DIAGNOSIS — N18.2 CHRONIC KIDNEY DISEASE (CKD) STAGE G2/A2, MILDLY DECREASED GLOMERULAR FILTRATION RATE (GFR) BETWEEN 60-89 ML/MIN/1.73 SQUARE METER AND ALBUMINURIA CREATININE RATIO BETWEEN 30-299 MG/G: ICD-10-CM

## 2024-12-23 DIAGNOSIS — E55.9 VITAMIN D DEFICIENCY: ICD-10-CM

## 2024-12-23 DIAGNOSIS — R19.7 DIARRHEA, UNSPECIFIED TYPE: ICD-10-CM

## 2024-12-23 LAB
ALBUMIN SERPL-MCNC: 4.2 G/DL (ref 3.2–4.8)
ALBUMIN/GLOB SERPL: 1.6 {RATIO} (ref 1–2)
ALP LIVER SERPL-CCNC: 86 U/L
ALT SERPL-CCNC: 55 U/L
ANION GAP SERPL CALC-SCNC: 7 MMOL/L (ref 0–18)
AST SERPL-CCNC: 50 U/L (ref ?–34)
BASOPHILS # BLD AUTO: 0.03 X10(3) UL (ref 0–0.2)
BASOPHILS NFR BLD AUTO: 0.4 %
BILIRUB SERPL-MCNC: 0.7 MG/DL (ref 0.3–1.2)
BUN BLD-MCNC: 9 MG/DL (ref 9–23)
BUN/CREAT SERPL: 9.3 (ref 10–20)
CALCIUM BLD-MCNC: 9.9 MG/DL (ref 8.7–10.4)
CHLORIDE SERPL-SCNC: 103 MMOL/L (ref 98–112)
CO2 SERPL-SCNC: 26 MMOL/L (ref 21–32)
CREAT BLD-MCNC: 0.97 MG/DL
DEPRECATED RDW RBC AUTO: 41.5 FL (ref 35.1–46.3)
EGFRCR SERPLBLD CKD-EPI 2021: 69 ML/MIN/1.73M2 (ref 60–?)
EOSINOPHIL # BLD AUTO: 0.09 X10(3) UL (ref 0–0.7)
EOSINOPHIL NFR BLD AUTO: 1.1 %
ERYTHROCYTE [DISTWIDTH] IN BLOOD BY AUTOMATED COUNT: 12.5 % (ref 11–15)
EST. AVERAGE GLUCOSE BLD GHB EST-MCNC: 206 MG/DL (ref 68–126)
FASTING STATUS PATIENT QL REPORTED: YES
GLOBULIN PLAS-MCNC: 2.6 G/DL (ref 2–3.5)
GLUCOSE BLD-MCNC: 186 MG/DL (ref 70–99)
HBA1C MFR BLD: 8.8 % (ref ?–5.7)
HCT VFR BLD AUTO: 46.1 %
HGB BLD-MCNC: 15.5 G/DL
IMM GRANULOCYTES # BLD AUTO: 0.02 X10(3) UL (ref 0–1)
IMM GRANULOCYTES NFR BLD: 0.2 %
LYMPHOCYTES # BLD AUTO: 2.37 X10(3) UL (ref 1–4)
LYMPHOCYTES NFR BLD AUTO: 27.8 %
MCH RBC QN AUTO: 30.6 PG (ref 26–34)
MCHC RBC AUTO-ENTMCNC: 33.6 G/DL (ref 31–37)
MCV RBC AUTO: 90.9 FL
MONOCYTES # BLD AUTO: 0.81 X10(3) UL (ref 0.1–1)
MONOCYTES NFR BLD AUTO: 9.5 %
NEUTROPHILS # BLD AUTO: 5.21 X10 (3) UL (ref 1.5–7.7)
NEUTROPHILS # BLD AUTO: 5.21 X10(3) UL (ref 1.5–7.7)
NEUTROPHILS NFR BLD AUTO: 61 %
OSMOLALITY SERPL CALC.SUM OF ELEC: 286 MOSM/KG (ref 275–295)
PLATELET # BLD AUTO: 236 10(3)UL (ref 150–450)
POTASSIUM SERPL-SCNC: 4 MMOL/L (ref 3.5–5.1)
PROT SERPL-MCNC: 6.8 G/DL (ref 5.7–8.2)
RBC # BLD AUTO: 5.07 X10(6)UL
SODIUM SERPL-SCNC: 136 MMOL/L (ref 136–145)
T4 FREE SERPL-MCNC: 1.1 NG/DL (ref 0.8–1.7)
TSI SER-ACNC: 0.88 UIU/ML (ref 0.55–4.78)
VIT D+METAB SERPL-MCNC: 42.6 NG/ML (ref 30–100)
WBC # BLD AUTO: 8.5 X10(3) UL (ref 4–11)

## 2024-12-23 PROCEDURE — 85025 COMPLETE CBC W/AUTO DIFF WBC: CPT

## 2024-12-23 PROCEDURE — 36415 COLL VENOUS BLD VENIPUNCTURE: CPT

## 2024-12-23 PROCEDURE — 83036 HEMOGLOBIN GLYCOSYLATED A1C: CPT

## 2024-12-23 PROCEDURE — 80053 COMPREHEN METABOLIC PANEL: CPT

## 2024-12-23 PROCEDURE — 84439 ASSAY OF FREE THYROXINE: CPT

## 2024-12-23 PROCEDURE — 82306 VITAMIN D 25 HYDROXY: CPT

## 2024-12-23 PROCEDURE — 84443 ASSAY THYROID STIM HORMONE: CPT

## 2024-12-23 NOTE — PROGRESS NOTES
CMP Normal (electrolytes, kidney and liver functions), liver enzymes are slightly elevated similar to prior.  Vitamin D level looks good.  Continue the same.  Lipid (choilesterol) is good,   Thyroid is good.   Hemoglobin A1c which is a 3-month average of sugars is worse than prior.  Goal is 6.5 or less.Careful with diet and excercise at least 30 minutes 3-4 times a week. Check sugars at different times on different dates. Careful with low sugars. Carry something with you and check sugar if can. Can carry aristides cracker, etc. Decrease carbohydrates. But also, careful with fruits and natural sugars.One serving a day and no more than 1 handful every day. Check feet  every AM and careful with sores and ulcers on feet bilaterally. Check eyes every year with dilated eye exam.  Check sugars.  2-hour postmeal should be less than 140s.  Pre-meal should be 's.  Both equally affected A1c.  Recheck A1c in 3 months.  Orders written.  How are her sugars looking?

## 2024-12-26 ENCOUNTER — PROCEDURE VISIT (OUTPATIENT)
Dept: PHYSICAL MEDICINE AND REHAB | Facility: CLINIC | Age: 54
End: 2024-12-26
Payer: COMMERCIAL

## 2024-12-26 DIAGNOSIS — M79.604 RIGHT LEG PAIN: Primary | ICD-10-CM

## 2024-12-26 PROCEDURE — 95886 MUSC TEST DONE W/N TEST COMP: CPT | Performed by: PHYSICAL MEDICINE & REHABILITATION

## 2024-12-26 PROCEDURE — 95908 NRV CNDJ TST 3-4 STUDIES: CPT | Performed by: PHYSICAL MEDICINE & REHABILITATION

## 2024-12-26 NOTE — PROGRESS NOTES
[facility]  Electromyography Consultation      History of Present Illness:    Dear Dr. Ho,  Thank you for the opportunity to see Rekha Moreno for electrodiagnostic consultation today. As you know the patient is a 54 year old female with a chief complaint of right lumbar radiculopathy due to lumbar stenosis.  She has paresthesias in the right lateral thigh and calf sometimes to the sole of the foot.  He has received numerous injections.  Additionally she is diabetic, hypothyroid and has a history of renal cell carcinoma in remission status post partial nephrectomy.    PAST MEDICAL HISTORY:  Past Medical History:    Calculus of kidney    Cancer (HCC)    kidney    Diabetes (HCC)    Disorder of thyroid    Epicondylitis, lateral    B/L. [facility].     Essential hypertension    High blood pressure    PONV (postoperative nausea and vomiting)    difficulty arousing        SURGICAL HISTORY:  Past Surgical History:   Procedure Laterality Date    Appendectomy  2008    Arthroscopy of joint unlisted Left 1-4-16    Labral tear reapir, iliopsoas burscetomy. (hip)    Arthroscopy of joint unlisted Right     bone spur and torn cartilage shoulder    Carpal tunnel release Right 2014    Carpal tunnel release Left 01/2019    [facility].    Cholecystectomy      Colonoscopy  05/2020    normal    Correct bunion,othr methods Right     Foot surgery Right     torn ligament repaired    Nephrectomy Right 10/01/2018    Partial nephrectomy    Other Right 2005    arthroscopy bone spur removal     Other Left 01/2019    Status post Left lateral epicondylar release.  Done by [facility].    Other Right     toe ligament repair       SOCIAL HISTORY:   Social History     Occupational History    Occupation: Finance.    Tobacco Use    Smoking status: Never    Smokeless tobacco: Never   Vaping Use    Vaping status: Never Used   Substance and Sexual Activity     Alcohol use: Yes     Alcohol/week: 0.0 standard drinks of alcohol     Comment: occassionally     Drug use: No    Sexual activity: Yes     Partners: Male     Birth control/protection: Pill       FAMILY HISTORY:   Family History   Problem Relation Age of Onset    Diabetes Father     Arthritis Father     Hypertension Father     Heart Disease Father 75        aortic valve disease    Cancer Father         Skin cancer.     Pacemaker Father     Arthritis Mother     Cancer Mother         lung, skin and MDS    Hypertension Mother     Heart Disorder Mother 60        CAD S/P MI.     Cancer Brother         Hodgkin's    Hypertension Brother     Cancer Maternal Grandmother         Hepatoma.     Cancer Paternal Uncle         Stomach.        CURRENT MEDICATIONS:   Current Outpatient Medications   Medication Sig Dispense Refill    rosuvastatin 5 MG Oral Tab Take 1 tablet (5 mg total) by mouth nightly. 90 tablet 3    insulin degludec 200 UNIT/ML Subcutaneous Solution Pen-injector 50 units qam and 40 units qpm. 39 mL 3    gabapentin 300 MG Oral Cap 300 mg in AM and 600 mg at night. 270 capsule 3    amLODIPine 2.5 MG Oral Tab Take 1 tablet (2.5 mg total) by mouth 2 (two) times daily. 180 tablet 3    atenolol 25 MG Oral Tab Take 1 tablet (25 mg total) by mouth nightly. 90 tablet 3    spironolactone 100 MG Oral Tab TAKE 1 TABLET EVERY MORNING AND ONE-HALF (1/2) TABLET IN THE EVENING 135 tablet 3    Insulin Pen Needle (BD PEN NEEDLE LINA 2ND GEN) 32G X 4 MM Does not apply Misc Inject 1 Needle into the skin As Directed. 90 each 3    Candesartan Cilexetil 8 MG Oral Tab Take 1 tablet (8 mg total) by mouth 2 (two) times daily. 180 tablet 3    Continuous Glucose Sensor (FREESTYLE MARCELLE 3 SENSOR) Does not apply Misc 1 each every 14 (fourteen) days. 24 each 3    levothyroxine 100 MCG Oral Tab TAKE 1 TABLET BEFORE BREAKFAST 90 tablet 3    semaglutide (OZEMPIC, 2 MG/DOSE,) 8 MG/3ML Subcutaneous Solution Pen-injector Inject 2 mg into the skin once  a week. 9 mL 3    zolpidem (AMBIEN) 5 MG Oral Tab Take 1 tablet (5 mg total) by mouth nightly as needed for Sleep. 15 tablet 0    KRILL OIL OR Take 1 capsule by mouth daily.      Calcium Carbonate-Vitamin D (CALCIUM + D OR) Take by mouth.      famotidine 10 MG Oral Tab Take 1 tablet (10 mg total) by mouth 2 (two) times daily. 60 tablet 1    Multiple Vitamins-Minerals (MULTIVITAMIN OR) Take by mouth daily.        Olopatadine HCl 0.1 % Ophthalmic Solution Place 1 drop into both eyes 2 (two) times daily as needed.         PHYSICAL EXAM:   There were no vitals taken for this visit.    There is no height or weight on file to calculate BMI.      General: No immediate distress   Extremities: peripheral pulses intact, no lower extremity edema bilaterally   Skin: No lesions noted.   Neuro:   Strength: Lower extremities have 5/5 strength  Muscle bulk: No atrophy  Sensation: Normal lower extremities  Reflexes: Normal lower extremities  SLR: Negative bilaterally      EMG/NCV  Sensory NCS      Nerve / Sites Distance Segments Peak Lat NP Amp    cm  ms µV   R SURAL - Antidr      Calf 14 Calf - Lat Mall 4.05 8.8      Ref.  Ref. 4.20 5.0   R SUP PERONEAL      Lat Leg 14 Lat Leg - Ankle NR NR      Ref.  Ref. 4.20        Motor NCS      Nerve / Sites Distance Segments Latency Amplitude Velocity Amp.1-2 Peak Dur. Area    cm  ms mV m/s % ms mVms   R COMM PERONEAL - EDB      Ankle 8 Ankle - EDB 5.00 4.7  100 7.00 18.6      Ref.  Ref. 6.00 2.0          Fib Head 29.5 Fib Head - Ankle 12.05 4.2 41.8 88.8 8.20 17.4      Ref.  Ref.   40.0         Knee 7.5 Knee - Fib Head 13.40 4.1 55.6 86.5 8.60 17.3       EMG Summary Table     Spontaneous MUAP Recruitment    IA Fib PSW Fasc H.F. Amp Dur. PPP Pattern   R. VAST LATERALIS N None None None None N N N N   R. TIB ANTERIOR N None None None None N N N N   R. PERON LONGUS N None None None None N N N N   R. GASTROCN (MED) N None None None None N N N N   R. TIB POSTERIOR N None None None None N N N N        Findings: Extremities were warmed with hot packs for 15 minutes prior to testing and skin temperature maintained above 32 C.  Sensory nerve conduction studies revealed a normal right sural response.  The right superficial peroneal response could not be obtained.  Motor conduction studies revealed a normal right common peroneal motor response with normal latencies, amplitudes and conduction velocities above and below the fibular head.  Needle EMG of the right lower extremity was normal  Impression:  1.  Normal study.  2.  No electrodiagnostic evidence of peripheral polyneuropathy.  3.  No electrodiagnostic evidence of right lumbar radiculopathy.  4.  The isolated absence of the superficial peroneal sensory response is nondiagnostic.      ASSESSMENT AND PLAN:  1. Right leg pain  The patient has chronic right leg pain in the face of lumbar stenosis.  The EMG is negative which does not preclude the diagnosis of radiculopathy but does indicate a good prognosis given lack of axon injury.        Thank you for the opportunity to participate in the care of this patient.  Sincerely,    Justin Laura M.D.  Diplomate American Board of Physical Medicine and Rehabilitation

## 2025-01-17 ENCOUNTER — MED REC SCAN ONLY (OUTPATIENT)
Dept: INTERNAL MEDICINE CLINIC | Facility: CLINIC | Age: 55
End: 2025-01-17

## 2025-01-17 DIAGNOSIS — E11.9 TYPE 2 DIABETES MELLITUS WITHOUT COMPLICATION, WITHOUT LONG-TERM CURRENT USE OF INSULIN (HCC): ICD-10-CM

## 2025-01-17 RX ORDER — ZOLPIDEM TARTRATE 5 MG/1
5 TABLET ORAL NIGHTLY PRN
Qty: 15 TABLET | Refills: 0 | OUTPATIENT
Start: 2025-01-17

## 2025-01-17 RX ORDER — LEVOTHYROXINE SODIUM 100 UG/1
TABLET ORAL
Qty: 90 TABLET | Refills: 3 | Status: SHIPPED | OUTPATIENT
Start: 2025-01-17

## 2025-01-17 RX ORDER — SEMAGLUTIDE 2.68 MG/ML
2 INJECTION, SOLUTION SUBCUTANEOUS WEEKLY
Qty: 9 ML | Refills: 1 | Status: SHIPPED | OUTPATIENT
Start: 2025-01-17

## 2025-01-20 RX ORDER — ZOLPIDEM TARTRATE 5 MG/1
5 TABLET ORAL NIGHTLY PRN
Qty: 15 TABLET | Refills: 0 | OUTPATIENT
Start: 2025-01-20

## 2025-01-20 NOTE — TELEPHONE ENCOUNTER
patient was called and she stated that she uses it periodically only when needed.   Patient stated that she has discussed this with you in the past. This is a new mail pharmacy due to insurance.

## 2025-01-20 NOTE — TELEPHONE ENCOUNTER
This is not a long-term medicine.  Just as needed.  So I am not sure she wants me to send it to mail order pharmacy if she wants me to just send her to the regular pharmacy since we do not use 90s generally on this tablet

## 2025-01-21 NOTE — TELEPHONE ENCOUNTER
patient was called and she stated that she will like for it to go to her [facility].  Thank you

## 2025-01-22 ENCOUNTER — OFFICE VISIT (OUTPATIENT)
Dept: DERMATOLOGY CLINIC | Facility: CLINIC | Age: 55
End: 2025-01-22

## 2025-01-22 DIAGNOSIS — L65.9 HAIR LOSS: ICD-10-CM

## 2025-01-22 DIAGNOSIS — L82.1 SK (SEBORRHEIC KERATOSIS): ICD-10-CM

## 2025-01-22 DIAGNOSIS — L81.4 LENTIGO: ICD-10-CM

## 2025-01-22 DIAGNOSIS — D23.9 BENIGN NEOPLASM OF SKIN, UNSPECIFIED LOCATION: ICD-10-CM

## 2025-01-22 DIAGNOSIS — D22.9 MULTIPLE NEVI: ICD-10-CM

## 2025-01-22 DIAGNOSIS — L40.0 PSORIASIS VULGARIS: Primary | ICD-10-CM

## 2025-01-22 PROCEDURE — 99204 OFFICE O/P NEW MOD 45 MIN: CPT | Performed by: DERMATOLOGY

## 2025-01-22 RX ORDER — DULOXETIN HYDROCHLORIDE 20 MG/1
20 CAPSULE, DELAYED RELEASE ORAL DAILY
COMMUNITY
Start: 2025-01-16 | End: 2025-04-16

## 2025-01-22 RX ORDER — MOMETASONE FUROATE 1 MG/G
1 CREAM TOPICAL DAILY
Qty: 45 G | Refills: 1 | Status: SHIPPED | OUTPATIENT
Start: 2025-01-22

## 2025-01-23 RX ORDER — LEVOTHYROXINE SODIUM 100 UG/1
TABLET ORAL
Qty: 90 TABLET | Refills: 3 | OUTPATIENT
Start: 2025-01-23

## 2025-01-23 NOTE — PROGRESS NOTES
Rekha Moreno is a 54 year old female.    CC:    Chief Complaint   Patient presents with    Full Skin Exam     New pt present for full body skin exam.  Family hx of skin cancer (father and mother). Spot of concern on R Ear for 1 year that is dry and scaly. Comes and goes. Denies any personal hx of skin cancer. Uses sunscreen.          HISTORY:    Past Medical History:    Calculus of kidney    Cancer (HCC)    kidney    Diabetes (HCC)    Disorder of thyroid    Epicondylitis, lateral    B/L. [facility].     Essential hypertension    High blood pressure    PONV (postoperative nausea and vomiting)    difficulty arousing       Past Surgical History:   Procedure Laterality Date    Appendectomy  2008    Arthroscopy of joint unlisted Left 1-4-16    Labral tear reapir, iliopsoas burscetomy. (hip)    Arthroscopy of joint unlisted Right     bone spur and torn cartilage shoulder    Carpal tunnel release Right 2014    Carpal tunnel release Left 01/2019    [facility].    Cholecystectomy      Colonoscopy  05/2020    normal    Correct bunion,othr methods Right     Foot surgery Right     torn ligament repaired    Nephrectomy Right 10/01/2018    Partial nephrectomy    Other Right 2005    arthroscopy bone spur removal     Other Left 01/2019    Status post Left lateral epicondylar release.  Done by [facility].    Other Right     toe ligament repair      Family History   Problem Relation Age of Onset    Diabetes Father     Arthritis Father     Hypertension Father     Heart Disease Father 75        aortic valve disease    Cancer Father         Skin cancer.     Pacemaker Father     Arthritis Mother     Cancer Mother         lung, skin and MDS    Hypertension Mother     Heart Disorder Mother 60        CAD S/P MI.     Cancer Brother         Hodgkin's    Hypertension Brother     Cancer Maternal Grandmother         Hepatoma.     Cancer Paternal Uncle         Stomach.       Social History     Socioeconomic  History    Marital status:     Number of children: 0   Occupational History    Occupation: Finance.    Tobacco Use    Smoking status: Never    Smokeless tobacco: Never   Vaping Use    Vaping status: Never Used   Substance and Sexual Activity    Alcohol use: Yes     Alcohol/week: 0.0 standard drinks of alcohol     Comment: occassionally     Drug use: No    Sexual activity: Yes     Partners: Male     Birth control/protection: Pill   Other Topics Concern    Caffeine Concern Yes     Comment: 5 8oz of coffee and soda daily    Exercise No    Reaction to local anesthetic No    Pt has a pacemaker No    Pt has a defibrillator No   Social History Narrative    The patient does not use an assistive device..      The patient does live in a home with stairs.        Current Outpatient Medications   Medication Sig Dispense Refill    DULoxetine 20 MG Oral Cap DR Particles Take 1 capsule (20 mg total) by mouth daily.      Mometasone Furoate 0.1 % External Cream Apply 1 Application topically daily. Apply a thin film to the affected area(s). 45 g 1    zolpidem (AMBIEN) 5 MG Oral Tab Take 1 tablet (5 mg total) by mouth nightly as needed for Sleep. 15 tablet 0    semaglutide (OZEMPIC, 2 MG/DOSE,) 8 MG/3ML Subcutaneous Solution Pen-injector Inject 2 mg into the skin once a week. 9 mL 1    levothyroxine 100 MCG Oral Tab TAKE 1 TABLET BEFORE BREAKFAST 90 tablet 3    rosuvastatin 5 MG Oral Tab Take 1 tablet (5 mg total) by mouth nightly. 90 tablet 3    insulin degludec 200 UNIT/ML Subcutaneous Solution Pen-injector 50 units qam and 40 units qpm. 39 mL 3    gabapentin 300 MG Oral Cap 300 mg in AM and 600 mg at night. 270 capsule 3    amLODIPine 2.5 MG Oral Tab Take 1 tablet (2.5 mg total) by mouth 2 (two) times daily. 180 tablet 3    atenolol 25 MG Oral Tab Take 1 tablet (25 mg total) by mouth nightly. 90 tablet 3    spironolactone 100 MG Oral Tab TAKE 1 TABLET EVERY MORNING AND ONE-HALF (1/2) TABLET IN THE EVENING 135 tablet 3     Insulin Pen Needle (BD PEN NEEDLE LINA 2ND GEN) 32G X 4 MM Does not apply Misc Inject 1 Needle into the skin As Directed. 90 each 3    Candesartan Cilexetil 8 MG Oral Tab Take 1 tablet (8 mg total) by mouth 2 (two) times daily. 180 tablet 3    Continuous Glucose Sensor (FREESTYLE MARCELLE 3 SENSOR) Does not apply Misc 1 each every 14 (fourteen) days. 24 each 3    KRILL OIL OR Take 1 capsule by mouth daily.      Calcium Carbonate-Vitamin D (CALCIUM + D OR) Take by mouth.      famotidine 10 MG Oral Tab Take 1 tablet (10 mg total) by mouth 2 (two) times daily. 60 tablet 1    Multiple Vitamins-Minerals (MULTIVITAMIN OR) Take by mouth daily.        Olopatadine HCl 0.1 % Ophthalmic Solution Place 1 drop into both eyes 2 (two) times daily as needed.       Allergies:   Allergies[1]    Past Medical History:    Calculus of kidney    Cancer (HCC)    kidney    Diabetes (HCC)    Disorder of thyroid    Epicondylitis, lateral    B/L. [facility].     Essential hypertension    High blood pressure    PONV (postoperative nausea and vomiting)    difficulty arousing      Past Surgical History:   Procedure Laterality Date    Appendectomy  2008    Arthroscopy of joint unlisted Left 1-4-16    Labral tear reapir, iliopsoas burscetomy. (hip)    Arthroscopy of joint unlisted Right     bone spur and torn cartilage shoulder    Carpal tunnel release Right 2014    Carpal tunnel release Left 01/2019    [facility].    Cholecystectomy      Colonoscopy  05/2020    normal    Correct bunion,othr methods Right     Foot surgery Right     torn ligament repaired    Nephrectomy Right 10/01/2018    Partial nephrectomy    Other Right 2005    arthroscopy bone spur removal     Other Left 01/2019    Status post Left lateral epicondylar release.  Done by [facility].    Other Right     toe ligament repair     Social History     Socioeconomic History    Marital status:      Spouse name: Not on file    Number of children: 0    Years  of education: Not on file    Highest education level: Not on file   Occupational History    Occupation: Finance.    Tobacco Use    Smoking status: Never    Smokeless tobacco: Never   Vaping Use    Vaping status: Never Used   Substance and Sexual Activity    Alcohol use: Yes     Alcohol/week: 0.0 standard drinks of alcohol     Comment: occassionally     Drug use: No    Sexual activity: Yes     Partners: Male     Birth control/protection: Pill   Other Topics Concern     Service Not Asked    Blood Transfusions Not Asked    Caffeine Concern Yes     Comment: 5 8oz of coffee and soda daily    Occupational Exposure Not Asked    Hobby Hazards Not Asked    Sleep Concern Not Asked    Stress Concern Not Asked    Weight Concern Not Asked    Special Diet Not Asked    Back Care Not Asked    Exercise No    Bike Helmet Not Asked    Seat Belt Not Asked    Self-Exams Not Asked    Grew up on a farm Not Asked    History of tanning Not Asked    Outdoor occupation Not Asked    Breast feeding Not Asked    Reaction to local anesthetic No    Pt has a pacemaker No    Pt has a defibrillator No   Social History Narrative    The patient does not use an assistive device..      The patient does live in a home with stairs.     Social Drivers of Health     Financial Resource Strain: Not on file   Food Insecurity: Not on file   Transportation Needs: Not on file   Physical Activity: Not on file   Stress: Not on file   Social Connections: Not on file   Housing Stability: Not on file     Family History   Problem Relation Age of Onset    Diabetes Father     Arthritis Father     Hypertension Father     Heart Disease Father 75        aortic valve disease    Cancer Father         Skin cancer.     Pacemaker Father     Arthritis Mother     Cancer Mother         lung, skin and MDS    Hypertension Mother     Heart Disorder Mother 60        CAD S/P MI.     Cancer Brother         Hodgkin's    Hypertension Brother     Cancer Maternal Grandmother          Hepatoma.     Cancer Paternal Uncle         Stomach.        HPI:     HPI:  Chief Complaint   Patient presents with    Full Skin Exam     New pt present for full body skin exam.  Family hx of skin cancer (father and mother). Spot of concern on R Ear for 1 year that is dry and scaly. Comes and goes. Denies any personal hx of skin cancer. Uses sunscreen.        History of Present Illness  The patient, with a history of diabetes, presented for a skin examination. The primary concern was a recurrent rash behind the right ear, which had been previously treated with ketoconazole. The rash improved with treatment but quickly recurred upon discontinuation of the medication. The patient had not been using any other treatments for this condition.    In addition to the skin concern, the patient reported increased hair shedding over the past six months. This issue began after a hip surgery in August. The patient has multiple health issues and had undergone several surgeries months prior to the onset of hair shedding. The patient also reported more issues with blood sugar control and planned to follow up with endocrinology.    The patient denied any new health issues or medications, joint complaints, arthritis, rashes, or new sun sensitivity. She has a history of sun exposure in the past but is generally careful with sun protection.    On examination, the patient had extensive lentigines diffusely on the face, upper back, shoulders, chest, arms, and legs. There was a psoriasiform plaque in the post auricular crease on the right, and mild erythema in the left post auricular crease. The scalp was clear with no inflammatory or scarring evidence. There was some decreased density over the crown, but the patient has a background of very thick hair. No other suspicious lesions were noted.    The patient's recent labs were remarkable for normal thyroid functions, an A1c of 8.8, normal vitamin D, mild elevation in ALT and AST at 55 and 50  respectively, and a normal blood count.    No personal  or family history of skin cancer    Patient presents with concerns above.    Patient has been in their usual state of health.     Past notes/ records and appropriate/relevant lab results including pathology and past body maps reviewed. Including outside notes/ PCP notes as appropriate. Updated and new information noted in current visit.     ROS:  Denies other relevant systemic complaints. See HPI.     History, medications, allergies reviewed as noted.    Allergies:  Lisinopril and Metformin      There were no vitals filed for this visit.    Physical Examination:     Well-developed well-nourished patient alert oriented in no acute distress.  Exam performed of appropriate involved areas    Physical Exam  HEENT: Right post auricular crease with psoriasiform plaque, mild erythema. Left post auricular crease with mild erythema, no scaling. Scalp with decreased density over the crown, very thick hair, no inflammatory lesions or scarring.  SKIN: Extensive atraumatic lentiginized areas on face, upper back, shoulders, chest, arms, legs. Keratotic papular nodules over right forearm toward wrist, scaling keratotic flesh-colored papule lateral to elbow consistent with inflamed keratoses. Scattered benign epidermal nevi, few seborrheic keratoses. No suspicious lesions noted.    Multiple light to medium brown, well marginated, uniformly pigmented, macules and papules 6 mm and less scattered on exam. pigmented lesions examined with dermoscopy benign-appearing patterns.     Waxy tannish keratotic papules scattered, cherry-red vascular papules scattered.    See map today's date for lesions noted .  See assessment and plan below for specific lesions.    Otherwise remarkable for lesions as noted on map.    See A/P  below for additional information:    Assessment / plan:    Results  LABS  A1c: 8.8%  ALT: 55 U/L  AST: 50 U/L    No orders of the defined types were placed in this  encounter.      Meds & Refills for this Visit:  Requested Prescriptions     Signed Prescriptions Disp Refills    Mometasone Furoate 0.1 % External Cream 45 g 1     Sig: Apply 1 Application topically daily. Apply a thin film to the affected area(s).         Encounter Diagnoses   Name Primary?    Psoriasis vulgaris Yes    SK (seborrheic keratosis)     Lentigo     Multiple nevi     Benign neoplasm of skin, unspecified location     Hair loss        Assessment & Plan  Psoriasis  Psoriasiform plaque in the right post auricular crease with mild erythema in the left post auricular crease. No other psoriasis evidence. Recurrent condition improves with ketoconazole but recurs upon discontinuation. Discussed diabetes association.  - Add mometasone cream  - Continue ketoconazole daily to BID as needed  - Notify clinic if no improvement    Hair Loss  Increased hair shedding over six months, possibly exacerbated by anesthesia and surgery in August. No inflammatory changes or scarring. Unknown family history of pattern loss. Labs: normal thyroid function, A1c 8.8, normal T4, normal vitamin D, mild elevation in ALT and AST. Discussed supportive care and OTC treatments. Explained initial minoxidil use may cause increased shedding.  - Continue supportive care with multivitamins, vitamin D, and adequate protein  - Follow up with endocrinology for blood sugar management  - Consider OTC products like Nexium shampoo  - Start OTC minoxidil 5% foam or solution QHS, may use BID  - Consider additional labs if no improvement in several months, including ferritin and CABRERA  Overall hair regimen discussed including B. vitamin supplementation, biotin to 10 mg daily-stop 3 days before any blood tests-, vitamin D3 2000 units daily, iron as appropriate, adequate protein intake (40 to 80 g daily), use of volumizing shampoos and antidandruff shampoos as appropriate.  Use of minoxidil 5% foam twice daily as tolerated if patient desires.  The fact this  may take 2-4 months to see any significant change discussed.  The fact regimen better and maintaining hair rather than regrowth reviewed      General Health Maintenance  Numerous benign lentigines, nevi, and seborrheic keratoses. No active actinic keratoses or suspicious lesions. Fair skin and history of sun exposure. Advised on sun protection importance.  - Continue sunscreen and sun protection  - Annual skin exam    Follow-up  - Follow up with endocrinology for blood sugar management  - Await endocrinology input on additional labs if no improvement in hair loss over several months.    Benign-appearing nevi, no atypical features on dermoscopy reassurance given monitor.     Waxy tan keratotic papules lesions in areas of concern as noted reassurance given.  Benign nature discussed.  Possibility of cryo, alphahydroxy acids over-the-counter retinol's discussed.     No other susupicious lesions on todays  exam.    Please refer to map for specific lesions.  See additional diagnoses.  Pros cons of various therapies, risks benefits discussed.Pathophysiology, terapeutic options reviewed.  See  Medications in grid.  Instructions reviewed at length.    Benign nevi, seborrheic  keratoses, cherry angiomas:  Reassurance regarding other benign skin lesions.    Monitor for new or changing lesions. Signs and symptoms of skin cancer, ABCDE's of melanoma ( additional information available at AAD.org, skincancer.org) Encourage Sunscreen (broad-spectrum, ideally mineral-based-UVA/UVB -SPF 30 or higher) use encouraged, sun protection/sun protective clothing, self exams reviewed Followup as noted RTC ---routine checkup 6 mos -one year or p.r.n.    Encounter Times   Including precharting, reviewing chart, prior notes obtaining history, medical exam, notes on body map, plan, counseling, discussion of therapeutic options, patient education, orders more than half total time spent in face to face time with patient.  My total time spent caring  for the patient on the day of the encounter: 45 minutes       The patient indicates understanding of these issues and agrees to the plan.  The patient is asked to return as noted in follow-up/ above.  [place] used by provider alone pre/post visit as patient declined use during exam.    This note was generated using Dragon voice recognition software.  Please contact me regarding any confusion resulting from errors in recognition..  Note to patient and family: The 21st Century Cures Act makes medical notes like these available to patients. However, be advised this is a medical document. It is intended as [id number] communication and monitoring of a patient's care needs. It is written in medical language and may contain abbreviations or verbiage that are unfamiliar. It may appear blunt or direct. Medical documents are intended to carry relevant information, facts as evident and the clinical opinion of the practitioner.         [1]   Allergies  Allergen Reactions    Lisinopril Coughing    Metformin DIARRHEA

## 2025-01-30 ENCOUNTER — OFFICE VISIT (OUTPATIENT)
Dept: SURGERY | Facility: CLINIC | Age: 55
End: 2025-01-30
Payer: COMMERCIAL

## 2025-01-30 ENCOUNTER — TELEPHONE (OUTPATIENT)
Dept: SURGERY | Facility: CLINIC | Age: 55
End: 2025-01-30

## 2025-01-30 VITALS
DIASTOLIC BLOOD PRESSURE: 70 MMHG | BODY MASS INDEX: 40.82 KG/M2 | WEIGHT: 254 LBS | HEIGHT: 66 IN | HEART RATE: 62 BPM | OXYGEN SATURATION: 96 % | SYSTOLIC BLOOD PRESSURE: 118 MMHG

## 2025-01-30 DIAGNOSIS — M54.16 LUMBAR RADICULOPATHY: ICD-10-CM

## 2025-01-30 DIAGNOSIS — M47.816 LUMBAR SPONDYLOSIS: Primary | ICD-10-CM

## 2025-01-30 DIAGNOSIS — M51.16 LUMBAR DISC HERNIATION WITH RADICULOPATHY: ICD-10-CM

## 2025-01-30 PROCEDURE — 3074F SYST BP LT 130 MM HG: CPT | Performed by: STUDENT IN AN ORGANIZED HEALTH CARE EDUCATION/TRAINING PROGRAM

## 2025-01-30 PROCEDURE — 3078F DIAST BP <80 MM HG: CPT | Performed by: STUDENT IN AN ORGANIZED HEALTH CARE EDUCATION/TRAINING PROGRAM

## 2025-01-30 PROCEDURE — 99417 PROLNG OP E/M EACH 15 MIN: CPT | Performed by: STUDENT IN AN ORGANIZED HEALTH CARE EDUCATION/TRAINING PROGRAM

## 2025-01-30 PROCEDURE — 3008F BODY MASS INDEX DOCD: CPT | Performed by: STUDENT IN AN ORGANIZED HEALTH CARE EDUCATION/TRAINING PROGRAM

## 2025-01-30 PROCEDURE — 99215 OFFICE O/P EST HI 40 MIN: CPT | Performed by: STUDENT IN AN ORGANIZED HEALTH CARE EDUCATION/TRAINING PROGRAM

## 2025-01-30 RX ORDER — PEN NEEDLE, DIABETIC 32GX 5/32"
1 NEEDLE, DISPOSABLE MISCELLANEOUS AS DIRECTED
Qty: 90 EACH | Refills: 3 | Status: SHIPPED | OUTPATIENT
Start: 2025-01-30

## 2025-01-30 NOTE — TELEPHONE ENCOUNTER
Refill passed per [facility] protocol.     Requested Prescriptions   Pending Prescriptions Disp Refills    Insulin Pen Needle (BD PEN NEEDLE LINA 2ND GEN) 32G X 4 MM Does not apply Misc 90 each 3     Sig: Inject 1 Needle into the skin As Directed.       Diabetic Supplies Protocol Passed - 1/30/2025  6:16 AM        Passed - In person appointment or virtual visit in the past 12 mos or appointment in next 3 mos     Recent Outpatient Visits              1 week ago Psoriasis vulgaris    [facility] Yara Castillo MD    Office Visit    2 months ago Lumbosacral spondylosis with radiculopathy    [facility] Adan Nicolas MD    Office Visit    2 months ago Acquired hypothyroidism    [facility] Leonila Allison MD    Office Visit    5 months ago Chronic kidney disease (CKD) stage G2/A2, mildly decreased glomerular filtration rate (GFR) between 60-89 mL/min/1.73 square meter and albuminuria creatinine ratio between  mg/g    [facility] Leonila Maradiaga MD    Office Visit    9 months ago Adult general medical exam    [facility] Leonila Maradiaga MD    Office Visit          Future Appointments         Provider Department Appt Notes    Today James Lima MD [facility] 2nd opinion Spinal stenosis of lumbar region - referred by Dr. Ho. MRI scheduled for 1/28 at [facility]. Pt aware to bring disc prior to appt    In 2 weeks Leonila Maradiaga MD [facility] 3 month follow up    In 3 months Leonila Maradiaga MD [facility] Annual Physical    In 9 months Farzana Daugherty MD [facility] diabetes and medications                    Passed -  Medication is active on med list             [unfilled]      [unfilled]

## 2025-01-30 NOTE — PROGRESS NOTES
[facility]  Neurological Surgery Established Patient Clinic Note    Rekha Moreno  12/11/1970  [id number]  PCP: Leonila Maradiaga MD  Referring Provider: Self    REASON FOR VISIT:  Low back pain with radiation    HISTORY OF PRESENT ILLNESS 1/30/2025:  Rekha Moreno is a(n) 54 year old female presenting for a new neurosurgical evaluation regarding persistent low back pain radiating into her right lower extremity for approximately one year. She first noticed the pain while in a walking boot for a nonunion ankle fracture, which led to prolonged weight-bearing imbalance. The back pain initially reached about 8/10 severity, causing significant difficulty standing (e.g., in the shower). Currently, she rates the pain at about 4/10 daily but states it never fully resolves. Prolonged sitting or standing worsens her symptoms; leaning forward or slightly to the side provides transient relief.    She describes the pain as an achy, deep discomfort in the low back with sharp, radiating components into her right buttock, posterior thigh, and calf. She has attempted multiple injections (including SI joint and likely L5-S1 epidural steroid injections) under another provider’s care, all of which have offered minimal or no sustained improvement. She denies significant leg weakness or frequent balance issues, though she notes that her right leg sometimes feels “off” since using the walking boot.    Her medical history is significant for type 2 diabetes mellitus (requiring insulin), hypertension, hypothyroidism, a right partial nephrectomy for kidney cancer six years ago, and a BMI of 41. She is otherwise ambulatory but limited by pain and is concerned about the lack of response to conservative measures.    PAST MEDICAL HISTORY:  Past Medical History:    Calculus of kidney    Cancer (HCC)    kidney    Diabetes (HCC)    Disorder of thyroid    Epicondylitis, lateral    [facility].     Essential hypertension    High blood pressure    PONV (postoperative nausea and vomiting)    difficulty arousing      PAST SURGICAL HISTORY:  Past Surgical History:   Procedure Laterality Date    Appendectomy  2008    Arthroscopy of joint unlisted Left 1-4-16    Labral tear reapir, iliopsoas burscetomy. (hip)    Arthroscopy of joint unlisted Right     bone spur and torn cartilage shoulder    Carpal tunnel release Right 2014    Carpal tunnel release Left 01/2019    [facility].    Cholecystectomy      Colonoscopy  05/2020    normal    Correct bunion,othr methods Right     Foot surgery Right     torn ligament repaired    Nephrectomy Right 10/01/2018    Partial nephrectomy    Other Right 2005    arthroscopy bone spur removal     Other Left 01/2019    Status post Left lateral epicondylar release.  Done by [facility].    Other Right     toe ligament repair     FAMILY HISTORY:  family history includes Arthritis in her father and mother; Cancer in her brother, father, maternal grandmother, mother, and paternal uncle; Diabetes in her father; Heart Disease (age of onset: 75) in her father; Heart Disorder (age of onset: 60) in her mother; Hypertension in her brother, father, and mother; Pacemaker in her father.    SOCIAL HISTORY:   reports that she has never smoked. She has never used smokeless tobacco. She reports current alcohol use. She reports that she does not use drugs.    ALLERGIES:  Allergies[1]    MEDICATIONS:  Medications Ordered Prior to Encounter[2]    REVIEW OF SYSTEMS:  All other systems were reviewed and were negative except for those previously mentioned in the HPI    PHYSICAL EXAMINATION:  General: No acute distress.  Respiratory: Non-labored respirations bilaterally. No audible wheezing  Cardiovascular: Extremities warm and well-perfused.  Abdomen: Soft, nontender, nondistended.   Musculoskeletal: Moves all extremities well, symmetrically.  Extremities: No  edema.    NEUROLOGIC EXAMINATION:  Mental status: Alert and oriented x 3  Speech: Clear, fluent  Cranial nerves: PERRLA, EOMI, face symmetric, with normal strength and sensation, tongue and palate midline, SCM 5/5 bilaterally  Motor:     RIGHT  Delt 5/5   Bic 5/5  Tri 5/5   HI 5/5    5/5  IP 5/5   Quad 5/5   Ham 5/5   AT 5/5   EHL 5/5 Hosea 5/5     LEFT    Delt 5/5   Bic 5/5  Tri 5/5   HI 5/5    5/5  IP 5/5   Quad 5/5   Ham 5/5   AT 5/5   EHL 5/5 Hosea 5/5   No pronator drift  Tone: Normal  Atrophy/Fasciculations: None  Sensation: Normal to light touch, symmetric, no neglect  Cerebellar: Normal finger nose finger  Gait: Normal, nondistressed heel toe tandem gait      Reflexes: 2+ throughout, symmetric, no Eliz's    IMAGING:  MR lumbar 4/25/2024: Demonstrates degenerative changes most prominent at L5-S1, with a disc protrusion/herniation contributing to bilateral foraminal narrowing and mild-to-moderate central canal narrowing. Facet arthropathy noted at L4-L5 and L5-S1. Findings are consistent with nerve root impingement, particularly involving the right S1 region, aligning with her described radicular pain pattern.        ASSESSMENT:  Rekha Moreno has chronic mechanical low back pain with right-sided lumbar radiculopathy, most likely involving the right S1 nerve root given her description of posterior calf involvement and MRI findings. She has demonstrated limited relief with injections to date, and her comorbidities (especially elevated BMI and diabetes) further complicate her symptom management.  In ideal conditions, I do believe that she could benefit from a minimally invasive L5-S1 decompression and possible discectomy to relieve the compression of the right S1 nerve root.  However, her high BMI increases the risk of spinal instability if a partial decompression alone is performed and/or complications directly related to lack of visualization during the procedure. Weight optimization and careful  medical management are critical to minimizing perioperative risks should take priority and I thoroughly counseled her to this effect.  I believe that given her diabetes and significant spinal degenerative spondylosis she could warrant an more aggressive weight loss program with medications as sustained any cancers to significant comorbidities directly stemming from obesity.    PLAN:  I believe that she could benefit from a minimally invasive lumbar decompression to address her S1 radiculopathy if she is able to reduce her risk profile and optimize her health prior to any surgery.    Advised structured weight reduction strategies (nutritional counseling, possible diabetic-friendly meal plans), given the correlation between higher BMI and worsened spine outcomes in order to improve her candidacy for surgical decompression.  Encouraged gentle core-strengthening exercises and low-impact aerobic activities (e.g., stationary bike, pool therapy) as tolerated.  Coordinate with endocrinology/primary care to maintain adequate glycemic control (HgbA1c goal per guidelines) and stable blood pressure, especially if surgical intervention becomes necessary.  Consider a short course of physician-guided PT focusing on spine stabilization, posture re-education, and safe range-of-motion exercises.  Revisit medication regimen (e.g., gabapentin or duloxetine) for neuropathic pain relief, ensuring no contraindications with her comorbid conditions.  If disabling radicular pain persists or worsens despite optimal conservative therapy, we discussed the possibility of L5-S1 decompression ± fusion to address foraminal narrowing and reduce recurrent nerve impingement.  Thorough preoperative optimization, including advanced imaging (CT or updated MRI if necessary) and close attention to BMI and glycemic control, would be required to minimize surgical risks.    James Lima MD  Neurological Surgery    [facility]  Neuroscience [address], [address]  [place] [address]  [phone]  Pager [phone]  1/30/2025 2:20 PM      This note was created using a voice-recognition transcribing system. Incorrect words or phrases may have been missed during proofreading. Please interpret accordingly.    Total Time    Established Patient Total Time       30  minutes.      Activities       Preparing to see the patient (chart/tests/imaging review).       Obtaining and/or reviewing separately obtained history.       Performing a medically appropriate examination and/or evaluation.       Counseling and educating the patient/family/caregiver.       Ordering medications, tests, or procedures.       Referring and communicating with other health care professionals (when not separately reported).       Documenting clincal information in the electronic or other health record.       Independently interpreting results (not separately reported).    Communicating results to the patient/family/caregiver.    Care coordination (not separately reported).       [1]   Allergies  Allergen Reactions    Lisinopril Coughing    Metformin DIARRHEA   [2]   Current Outpatient Medications on File Prior to Visit   Medication Sig Dispense Refill    Insulin Pen Needle (BD PEN NEEDLE LINA 2ND GEN) 32G X 4 MM Does not apply Misc Inject 1 Needle into the skin As Directed. 90 each 3    DULoxetine 20 MG Oral Cap DR Particles Take 1 capsule (20 mg total) by mouth daily.      Mometasone Furoate 0.1 % External Cream Apply 1 Application topically daily. Apply a thin film to the affected area(s). 45 g 1    zolpidem (AMBIEN) 5 MG Oral Tab Take 1 tablet (5 mg total) by mouth nightly as needed for Sleep. 15 tablet 0    semaglutide (OZEMPIC, 2 MG/DOSE,) 8 MG/3ML Subcutaneous Solution Pen-injector Inject 2 mg into the skin once a week. 9 mL 1    levothyroxine 100 MCG Oral Tab TAKE 1 TABLET BEFORE BREAKFAST 90 tablet 3    rosuvastatin 5 MG Oral Tab Take 1 tablet (5 mg  total) by mouth nightly. 90 tablet 3    insulin degludec 200 UNIT/ML Subcutaneous Solution Pen-injector 50 units qam and 40 units qpm. 39 mL 3    gabapentin 300 MG Oral Cap 300 mg in AM and 600 mg at night. 270 capsule 3    amLODIPine 2.5 MG Oral Tab Take 1 tablet (2.5 mg total) by mouth 2 (two) times daily. 180 tablet 3    atenolol 25 MG Oral Tab Take 1 tablet (25 mg total) by mouth nightly. 90 tablet 3    spironolactone 100 MG Oral Tab TAKE 1 TABLET EVERY MORNING AND ONE-HALF (1/2) TABLET IN THE EVENING 135 tablet 3    Candesartan Cilexetil 8 MG Oral Tab Take 1 tablet (8 mg total) by mouth 2 (two) times daily. 180 tablet 3    Continuous Glucose Sensor (FREESTYLE MARCELLE 3 SENSOR) Does not apply Misc 1 each every 14 (fourteen) days. 24 each 3    KRILL OIL OR Take 1 capsule by mouth daily.      Calcium Carbonate-Vitamin D (CALCIUM + D OR) Take by mouth.      famotidine 10 MG Oral Tab Take 1 tablet (10 mg total) by mouth 2 (two) times daily. 60 tablet 1    Multiple Vitamins-Minerals (MULTIVITAMIN OR) Take by mouth daily.        Olopatadine HCl 0.1 % Ophthalmic Solution Place 1 drop into both eyes 2 (two) times daily as needed.       No current facility-administered medications on file prior to visit.

## 2025-01-30 NOTE — PROGRESS NOTES
New patient:  Reason for visit: 2nd opinion, Prev saw Dr. Nicolas   Dx: Lumbosacral spondylosis with radiculopathy   Referred: initial referral from Dr. Ho @ [facility]      Numeric Rating Scale:      Pain at Present:  4/10                                                                                                                       Distribution of Pain: Right side, top of the glute; tingling in R leg upon standing as well as pain.       Past Treatments for Current Pain Condition:    Surgery: none  Physical Therapy: yes    Injections: yes; notes from Dr. Ho @ [facility] :    \"Injection history:  R. Piriformis - 10/28/2024 - no significant relief  R. L5-S1 ILESI - 9/25/2024 - no significant relief  Right TFESI @ L5 and S1 - 6/26/24 - 60% relief overall  Right ILESI @ L5-S1- 5/15/24 -40% relief  Right SIJ CSI 4/3/24 -no relief  TPI 3/18/24 -no relief \"    Medications:  gabapentin, tylenol, duloxetine     Prior diagnostic testing related to this condition:      MRI lumbar 1/28/25  MRI lumbar 4/25/24 (done at [facility])  XR lumbar 3/18/24

## 2025-01-30 NOTE — TELEPHONE ENCOUNTER
Patient brought in there MRI Lumbar spine     Imaging was uploaded to PACS and was return to patient at her appointment

## 2025-02-13 ENCOUNTER — OFFICE VISIT (OUTPATIENT)
Dept: INTERNAL MEDICINE CLINIC | Facility: CLINIC | Age: 55
End: 2025-02-13

## 2025-02-13 ENCOUNTER — TELEPHONE (OUTPATIENT)
Dept: INTERNAL MEDICINE CLINIC | Facility: CLINIC | Age: 55
End: 2025-02-13

## 2025-02-13 VITALS
SYSTOLIC BLOOD PRESSURE: 126 MMHG | DIASTOLIC BLOOD PRESSURE: 76 MMHG | WEIGHT: 261.38 LBS | BODY MASS INDEX: 42.01 KG/M2 | TEMPERATURE: 97 F | OXYGEN SATURATION: 100 % | HEIGHT: 66 IN | HEART RATE: 64 BPM

## 2025-02-13 DIAGNOSIS — R74.8 ELEVATED LIVER ENZYMES: Primary | ICD-10-CM

## 2025-02-13 DIAGNOSIS — E03.9 ACQUIRED HYPOTHYROIDISM: ICD-10-CM

## 2025-02-13 DIAGNOSIS — E11.9 TYPE 2 DIABETES MELLITUS WITHOUT COMPLICATION, WITHOUT LONG-TERM CURRENT USE OF INSULIN (HCC): ICD-10-CM

## 2025-02-13 DIAGNOSIS — I10 PRIMARY HYPERTENSION: ICD-10-CM

## 2025-02-13 DIAGNOSIS — G47.09 OTHER INSOMNIA: ICD-10-CM

## 2025-02-13 DIAGNOSIS — E55.9 VITAMIN D DEFICIENCY: ICD-10-CM

## 2025-02-13 DIAGNOSIS — Z12.31 ENCOUNTER FOR SCREENING MAMMOGRAM FOR MALIGNANT NEOPLASM OF BREAST: ICD-10-CM

## 2025-02-13 DIAGNOSIS — Z71.85 VACCINE COUNSELING: ICD-10-CM

## 2025-02-13 PROCEDURE — 3074F SYST BP LT 130 MM HG: CPT | Performed by: INTERNAL MEDICINE

## 2025-02-13 PROCEDURE — 3078F DIAST BP <80 MM HG: CPT | Performed by: INTERNAL MEDICINE

## 2025-02-13 PROCEDURE — 3008F BODY MASS INDEX DOCD: CPT | Performed by: INTERNAL MEDICINE

## 2025-02-13 PROCEDURE — 99215 OFFICE O/P EST HI 40 MIN: CPT | Performed by: INTERNAL MEDICINE

## 2025-02-13 RX ORDER — TIRZEPATIDE 2.5 MG/.5ML
2.5 INJECTION, SOLUTION SUBCUTANEOUS WEEKLY
Qty: 6 ML | Refills: 1 | Status: SHIPPED | OUTPATIENT
Start: 2025-02-13

## 2025-02-13 RX ORDER — ZOLPIDEM TARTRATE 5 MG/1
5 TABLET ORAL NIGHTLY PRN
Qty: 15 TABLET | Refills: 0 | Status: SHIPPED | OUTPATIENT
Start: 2025-02-13

## 2025-02-13 RX ORDER — DULOXETINE 40 MG/1
20 CAPSULE, DELAYED RELEASE ORAL NIGHTLY
Qty: 90 CAPSULE | Refills: 3 | Status: SHIPPED | OUTPATIENT
Start: 2025-02-13 | End: 2025-05-14

## 2025-02-13 RX ORDER — PEN NEEDLE, DIABETIC 32GX 5/32"
1 NEEDLE, DISPOSABLE MISCELLANEOUS AS DIRECTED
Qty: 90 EACH | Refills: 3 | Status: SHIPPED | OUTPATIENT
Start: 2025-02-13

## 2025-02-13 RX ORDER — PEN NEEDLE, DIABETIC 32GX 5/32"
1 NEEDLE, DISPOSABLE MISCELLANEOUS AS DIRECTED
Qty: 90 EACH | Refills: 3 | Status: CANCELLED | OUTPATIENT
Start: 2025-02-13

## 2025-02-13 RX ORDER — GABAPENTIN 300 MG/1
CAPSULE ORAL
Qty: 270 CAPSULE | Refills: 3 | Status: SHIPPED | OUTPATIENT
Start: 2025-02-13

## 2025-02-13 RX ORDER — SEMAGLUTIDE 2.68 MG/ML
2 INJECTION, SOLUTION SUBCUTANEOUS WEEKLY
Qty: 9 ML | Refills: 1 | Status: CANCELLED | OUTPATIENT
Start: 2025-02-13

## 2025-02-13 RX ORDER — LEVOTHYROXINE SODIUM 100 UG/1
TABLET ORAL
Qty: 90 TABLET | Refills: 3 | Status: SHIPPED | OUTPATIENT
Start: 2025-02-13

## 2025-02-13 NOTE — PROGRESS NOTES
HPI:    Patient ID: Rekha Moreno is a 54 year old female.    Chief Complaint   Patient presents with   • Follow - Up     Patient states she is here for 3 month follow up           Patient here for follow up of Diabetes.  Has been taking medications regularly.      Watches diabetic diet, low salt.  Diabetic Flow sheet      2/13/2025     5:38 PM 2/13/2025     4:33 PM 2/13/2025     4:32 PM 1/30/2025     1:07 PM   DIABETIC FLOWSHEET ([facility])   BMI   42.19 kg/m2 41 kg/m2   Candesartan Cilexetil 8 mg BID OR    8 mg BID OR     8 mg BID OR     8 mg BID OR       Weight (enc vitals)   261 lb 6.4 oz 254 lb   BP (enc vitals)   126/76 118/70   PHQ2 Score  0         Medication marked as long-term      Last eye exam n with Dr Jalloh 1 week ago. No sign of diabetic retinopathy.  Checks feet nightly, no open sores     HISTORY OF DIABETES COMPLICATIONS: :  History of Retinopathy: No.   History of Neuropathy: No.   History of Nephropathy: Yes.      ASSOCIATED COMPLICATIONS:   HTN: Yes.   Hyperlipidemia: Yes.   Coronary Artery Disease:  CAD,  HF, PAD  Cerebrovascular Disease: No.         HOME GLUCOSE READINGS:   Fasting: Not as much in 300's. But no more cortisone. 140-150's. No lows.   Other days BG are between 300's at bedtime.   Patient denies any co relation with meals/ activity     CURRENT DIABETIC MEDICATIONS INCLUDE:       MEALS:  3 meals a day  Cooks at home but also, eats out.     Hypertension  Patient is here for follow up of hypertension. BP at home: not check.   Has been compliant with medications.  Exercise level: exercises 3 times a  week (walks X 30 minutes cannot do more because of back.  Refuses to get into a bathing suit and does not want to do swimming.  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  Does also do strength training.  Hip is better but back is the same. ) And has been following low salt diet.  Weight has been stable. Cooks.   Wt Readings from Last 3 Encounters:   02/13/25 261 lb  6.4 oz (118.6 kg)   01/30/25 254 lb (115.2 kg)   11/19/24 250 lb (113.4 kg)     BP Readings from Last 3 Encounters:   02/13/25 126/76   01/30/25 118/70   11/08/24 115/70     Labs:   Lab Results   Component Value Date/Time     (H) 12/23/2024 11:00 AM     12/23/2024 11:00 AM    K 4.0 12/23/2024 11:00 AM     12/23/2024 11:00 AM    CO2 26.0 12/23/2024 11:00 AM    CREATSERUM 0.97 12/23/2024 11:00 AM    CA 9.9 12/23/2024 11:00 AM    AST 50 (H) 12/23/2024 11:00 AM    ALT 55 (H) 12/23/2024 11:00 AM    TSH 0.882 12/23/2024 11:00 AM    T4F 1.1 12/23/2024 11:00 AM        Lab Results   Component Value Date/Time    CHOLEST 104 02/09/2024 09:04 AM    HDL 47 02/09/2024 09:04 AM    TRIG 74 02/09/2024 09:04 AM    LDL 42 02/09/2024 09:04 AM    NONHDLC 57 02/09/2024 09:04 AM            Wt Readings from Last 3 Encounters:   02/13/25 261 lb 6.4 oz (118.6 kg)   01/30/25 254 lb (115.2 kg)   11/19/24 250 lb (113.4 kg)     BP Readings from Last 3 Encounters:   02/13/25 126/76   01/30/25 118/70   11/08/24 115/70     Labs:   Lab Results   Component Value Date/Time     (H) 12/23/2024 11:00 AM     12/23/2024 11:00 AM    K 4.0 12/23/2024 11:00 AM     12/23/2024 11:00 AM    CO2 26.0 12/23/2024 11:00 AM    CREATSERUM 0.97 12/23/2024 11:00 AM    CA 9.9 12/23/2024 11:00 AM    AST 50 (H) 12/23/2024 11:00 AM    ALT 55 (H) 12/23/2024 11:00 AM    TSH 0.882 12/23/2024 11:00 AM    T4F 1.1 12/23/2024 11:00 AM        Lab Results   Component Value Date/Time    CHOLEST 104 02/09/2024 09:04 AM    HDL 47 02/09/2024 09:04 AM    TRIG 74 02/09/2024 09:04 AM    LDL 42 02/09/2024 09:04 AM    NONHDLC 57 02/09/2024 09:04 AM          Just started a new job 4 days ago.  Similar to prior job.  Works from home 2 to 3 days a week.  Just got back with health insurance.        Review of Systems   Constitutional:  Positive for activity change and fatigue. Negative for appetite change, chills, diaphoresis, fever and unexpected weight  change.   Respiratory:  Negative for apnea, cough, choking, chest tightness, shortness of breath, wheezing and stridor.    Cardiovascular:  Negative for chest pain, palpitations and leg swelling.   Gastrointestinal:  Negative for abdominal distention and abdominal pain.   Endocrine: Negative for cold intolerance, heat intolerance, polydipsia, polyphagia and polyuria.   Neurological:  Negative for dizziness, tremors, seizures, syncope, facial asymmetry, speech difficulty, weakness, light-headedness, numbness and headaches.   Psychiatric/Behavioral:  Positive for agitation and sleep disturbance. Negative for behavioral problems, confusion, decreased concentration, dysphoric mood, hallucinations, self-injury and suicidal ideas. The patient is not nervous/anxious and is not hyperactive.         Cannot settle mind down.  Needs a refill on Ambien just to use as needed.  Knows and concerns with addiction.   All other systems reviewed and are negative.        Current Outpatient Medications   Medication Sig Dispense Refill   • zolpidem (AMBIEN) 5 MG Oral Tab Take 1 tablet (5 mg total) by mouth nightly as needed for Sleep. 15 tablet 0   • levothyroxine 100 MCG Oral Tab TAKE 1 TABLET BEFORE BREAKFAST 90 tablet 3   • Tirzepatide (MOUNJARO) 2.5 MG/0.5ML Subcutaneous Solution Auto-injector Inject 2.5 mg into the skin once a week. 6 mL 1   • insulin degludec 200 UNIT/ML Subcutaneous Solution Pen-injector 55 units qam and 50 units qpm. 39 mL 3   • Insulin Pen Needle (BD PEN NEEDLE LINA 2ND GEN) 32G X 4 MM Does not apply Misc Inject 1 Needle into the skin As Directed. 90 each 3   • DULoxetine 40 MG Oral Cap DR Particles Take 20 mg by mouth at bedtime. 90 capsule 3   • gabapentin 300 MG Oral Cap 300 mg in AM. 270 capsule 3   • Mometasone Furoate 0.1 % External Cream Apply 1 Application topically daily. Apply a thin film to the affected area(s). 45 g 1   • semaglutide (OZEMPIC, 2 MG/DOSE,) 8 MG/3ML Subcutaneous Solution Pen-injector  Inject 2 mg into the skin once a week. 9 mL 1   • rosuvastatin 5 MG Oral Tab Take 1 tablet (5 mg total) by mouth nightly. 90 tablet 3   • amLODIPine 2.5 MG Oral Tab Take 1 tablet (2.5 mg total) by mouth 2 (two) times daily. 180 tablet 3   • atenolol 25 MG Oral Tab Take 1 tablet (25 mg total) by mouth nightly. 90 tablet 3   • spironolactone 100 MG Oral Tab TAKE 1 TABLET EVERY MORNING AND ONE-HALF (1/2) TABLET IN THE EVENING 135 tablet 3   • Candesartan Cilexetil 8 MG Oral Tab Take 1 tablet (8 mg total) by mouth 2 (two) times daily. 180 tablet 3   • Continuous Glucose Sensor (FREESTYLE MARCELLE 3 SENSOR) Does not apply Misc 1 each every 14 (fourteen) days. 24 each 3   • KRILL OIL OR Take 1 capsule by mouth daily.     • Calcium Carbonate-Vitamin D (CALCIUM + D OR) Take by mouth.     • famotidine 10 MG Oral Tab Take 1 tablet (10 mg total) by mouth 2 (two) times daily. 60 tablet 1   • Multiple Vitamins-Minerals (MULTIVITAMIN OR) Take by mouth daily.       • Olopatadine HCl 0.1 % Ophthalmic Solution Place 1 drop into both eyes 2 (two) times daily as needed.       Allergies:Allergies[1]    HISTORY:  Past Medical History:   • Calculus of kidney   • Cancer (HCC)    kidney   • Diabetes (HCC)   • Disorder of thyroid   • Epicondylitis, lateral    [facility]    • Essential hypertension   • High blood pressure   • PONV (postoperative nausea and vomiting)    difficulty arousing       Past Surgical History:   Procedure Laterality Date   • Appendectomy  2008   • Arthroscopy of joint unlisted Left 1-4-16    Labral tear reapir, iliopsoas burscetomy. (hip)   • Arthroscopy of joint unlisted Right     bone spur and torn cartilage shoulder   • Carpal tunnel release Right 2014   • Carpal tunnel release Left 01/2019    [facility].   • Cholecystectomy     • Colonoscopy  05/2020    normal   • Correct bunion,othr methods Right    • Foot surgery Right     torn ligament repaired   • Nephrectomy Right 10/01/2018    Partial  nephrectomy   • Other Right 2005    arthroscopy bone spur removal    • Other Left 01/2019    Status post Left lateral epicondylar release.  Done by [facility].   • Other Right     toe ligament repair      Family History   Problem Relation Age of Onset   • Diabetes Father    • Arthritis Father    • Hypertension Father    • Heart Disease Father 75        aortic valve disease   • Cancer Father         Skin cancer.    • Pacemaker Father    • Arthritis Mother    • Cancer Mother         lung, skin and MDS   • Hypertension Mother    • Heart Disorder Mother 60        CAD S/P MI.    • Cancer Brother         Hodgkin's   • Hypertension Brother    • Cancer Maternal Grandmother         Hepatoma.    • Cancer Paternal Uncle         Stomach.       Social History:   Social History     Socioeconomic History   • Marital status:    • Number of children: 0   Occupational History   • Occupation: Finance.    Tobacco Use   • Smoking status: Never   • Smokeless tobacco: Never   Vaping Use   • Vaping status: Never Used   Substance and Sexual Activity   • Alcohol use: Yes     Alcohol/week: 0.0 standard drinks of alcohol     Comment: occassionally    • Drug use: No   • Sexual activity: Yes     Partners: Male     Birth control/protection: Pill   Other Topics Concern   • Caffeine Concern Yes     Comment: 5 8oz of coffee and soda daily   • Exercise No   • Reaction to local anesthetic No   • Pt has a pacemaker No   • Pt has a defibrillator No   Social History Narrative    The patient does not use an assistive device..      The patient does live in a home with stairs.        PHYSICAL EXAM:   /76 (BP Location: Right arm, Patient Position: Sitting, Cuff Size: large)   Pulse 64   Temp 97.3 °F (36.3 °C) (Temporal)   Ht 5' 6\" (1.676 m)   Wt 261 lb 6.4 oz (118.6 kg)   SpO2 100%   BMI 42.19 kg/m²   BP Readings from Last 3 Encounters:   02/13/25 126/76   01/30/25 118/70   11/08/24 115/70     Wt Readings from Last 3 Encounters:    02/13/25 261 lb 6.4 oz (118.6 kg)   01/30/25 254 lb (115.2 kg)   11/19/24 250 lb (113.4 kg)       Physical Exam  Vitals and nursing note reviewed.   Constitutional:       General: She is not in acute distress.     Appearance: Normal appearance. She is well-developed. She is not ill-appearing, toxic-appearing or diaphoretic.      Interventions: She is not intubated.  Neck:      Thyroid: No thyroid mass or thyromegaly.      Trachea: Trachea and phonation normal.   Cardiovascular:      Rate and Rhythm: Normal rate and regular rhythm.      Pulses: Normal pulses. No decreased pulses.           Carotid pulses are 2+ on the right side and 2+ on the left side.       Radial pulses are 2+ on the right side and 2+ on the left side.        Dorsalis pedis pulses are 2+ on the right side and 2+ on the left side.        Posterior tibial pulses are 2+ on the right side and 2+ on the left side.      Heart sounds: Normal heart sounds, S1 normal and S2 normal.   Pulmonary:      Effort: Pulmonary effort is normal. No tachypnea, bradypnea, accessory muscle usage, prolonged expiration, respiratory distress or retractions. She is not intubated.      Breath sounds: Normal breath sounds and air entry. No stridor, decreased air movement or transmitted upper airway sounds. No decreased breath sounds, wheezing, rhonchi or rales.   Chest:      Chest wall: No tenderness.   Abdominal:      General: There is no distension.      Palpations: Abdomen is soft.      Tenderness: There is no abdominal tenderness.   Musculoskeletal:      Right lower leg: No edema.      Left lower leg: No edema.   Skin:     General: Skin is warm and dry.   Neurological:      Mental Status: She is alert and oriented to person, place, and time.   Psychiatric:         Attention and Perception: She is attentive. She does not perceive auditory or visual hallucinations.         Mood and Affect: Mood is not anxious, depressed or elated. Affect is not labile, blunt, flat, angry,  tearful or inappropriate.         Speech: She is communicative. Speech is not rapid and pressured, delayed, slurred or tangential.         Behavior: Behavior normal. Behavior is not agitated, slowed, aggressive, withdrawn, hyperactive or combative. Behavior is cooperative.         Thought Content: Thought content normal. Thought content is not paranoid or delusional. Thought content does not include homicidal or suicidal ideation. Thought content does not include homicidal or suicidal plan.            ASSESSMENT/PLAN:     Encounter Diagnoses   Name Primary?   • Elevated liver enzymes Check blood.    Yes   • Primary hypertension Stable. Careful with diet and excercise at least 30 minutes 3-4 times a week. Check blood pressures at different times on different days. Can purchase own blood pressure monitor. If not, check at local pharmacy. Bake foods more and grill occasionally. Avoid fried foods. No salt. Use other seasonings.        • Acquired hypothyroidism Stable.       • Type 2 diabetes mellitus without complication, without long-term current use of insulin (HCC) higher.  Will finish off last dose of Ozempic and changed to Mounjaro.  Will increase insulin in the meantime.  Call in 2 weeks with sugars.  Has a follow-up with endocrinology PA at [facility] in April 2025.  Has a follow-up with endocrinology here but not until November which is the earliest appointment she can get.  Change to mounjaro 2.5 mg weekly. Careful with diet and excercise at least 30 minutes 3-4 times a week. Check sugars at different times on different dates. Careful with low sugars. Carry something with you and check sugar if can. Can carry aristides cracker, etc. Decrease carbohydrates. But also, careful with fruits and natural sugars.One serving a day and no more than 1 handful every day. Check feet  every AM and careful with sores and ulcers on feet bilaterally. Check eyes every year with dilated eye exam.  Check sugars.  2-hour postmeal should be  less than 140s.  Pre-meal should be 's.  Both equally affected A1c.  Discussed importance of glycemic control to prevent complications of diabetes  -Discussed complications of diabetes include retinopathy, neuropathy, nephropathy and cardiovascular disease  -Discussed ABCs of DM  -Discussed importance of SBGM  -Discussed importance of low CHO diet, recommend 45gm per meal or 135gm per day  -UTD with optho, check records.   -Normotensive       • Vaccine counseling Up to date.       • Vitamin D deficiency Stable.       • Encounter for screening mammogram for malignant neoplasm of breast Normal mammogram. Continue self breast exam every month.        • Other insomnia Stick to a sleep schedule. Keep your bedtime and wake time consistent from day to day, including on weekends.   Stay active. Regular activity helps promote a good night's sleep. Schedule exercise at least a few hours before bedtime and avoid stimulating activities before bedtime.   Check your medications. If you take medications regularly, check with your doctor to see if they may be contributing to your insomnia. Also check the labels of OTC products to see if they contain caffeine or other stimulants, such as pseudoephedrine.   Avoid or limit naps. Naps can make it harder to fall asleep at night. If you can't get by without one, try to limit a nap to no more than 30 minutes and don't nap after 3 p.m.   Avoid or limit caffeine and alcohol and don't use nicotine. All of these can make it harder to sleep, and effects can last for several hours.   Avoid large meals and beverages before bed. A light snack is fine and may help avoid heartburn. Drink less liquid before bedtime so that you won't have to urinate as often.  At bedtime:  Try melatonin 10 mg 30 minutes before bed.  Make your bedroom comfortable for sleep. Only use your bedroom for sex or sleep. Keep it dark and quiet, at a comfortable temperature. Hide all clocks in your bedroom, including  your wristwatch and cellphone, so you don't worry about what time it is.   Find ways to relax. Try to put your worries and planning aside when you get into bed. A warm bath or a massage before bedtime can help prepare you for sleep. Create a relaxing bedtime ritual, such as taking a hot bath, reading, soft music, breathing exercises, yoga or prayer.   Avoid trying too hard to sleep. The harder you try, the more awake you'll become. Read in another room until you become very drowsy, then go to bed to sleep. Don't go to bed too early, before you're sleepy.   Get out of bed when you're not sleeping. Sleep as much as you need to feel rested, and then get out of bed. Don't stay in bed if you're not sleeping. Pt. Know to get meds. From me ONLY. Also, patient knows not to take meds. and drive. Careful with grogginess when taking meds. Careful with addiction potential for these drugs.        Back pain.  Cymbalta seems to be helping from all 4 out of 10 pain to 2 or 3 out of 10 pain.  Does not want surgery.  Does not want any more cortisone injections.  Will increase Cymbalta to 20 mg at night.  Since multiple medications and trying to taper.  Will decrease gabapentin to 200 in the morning.  Call with update in 2 weeks.    Orders Placed This Encounter   Procedures   • Lipid Panel   • Microalb/Creat Ratio, Random Urine   • Comp Metabolic Panel (14)       Meds This Visit:  Requested Prescriptions     Signed Prescriptions Disp Refills   • zolpidem (AMBIEN) 5 MG Oral Tab 15 tablet 0     Sig: Take 1 tablet (5 mg total) by mouth nightly as needed for Sleep.   • levothyroxine 100 MCG Oral Tab 90 tablet 3     Sig: TAKE 1 TABLET BEFORE BREAKFAST   • Tirzepatide (MOUNJARO) 2.5 MG/0.5ML Subcutaneous Solution Auto-injector 6 mL 1     Sig: Inject 2.5 mg into the skin once a week.   • insulin degludec 200 UNIT/ML Subcutaneous Solution Pen-injector 39 mL 3     Si units qam and 50 units qpm.   • Insulin Pen Needle (BD PEN NEEDLE LINA  2ND GEN) 32G X 4 MM Does not apply Misc 90 each 3     Sig: Inject 1 Needle into the skin As Directed.   • DULoxetine 40 MG Oral Cap DR Particles 90 capsule 3     Sig: Take 20 mg by mouth at bedtime.   • gabapentin 300 MG Oral Cap 270 capsule 3     Si mg in AM.       Imaging & Referrals:  None        Has set ashleigh't.          [1]   Allergies  Allergen Reactions   • Lisinopril Coughing   • Metformin DIARRHEA

## 2025-02-14 NOTE — TELEPHONE ENCOUNTER
Approved    Prior authorization approved  Payer: [facility] [employer] Case ID: [id number]  Note from payer: Request Reference Number: [id number].  MOUNJARO     INJ 2.5/0.5 is approved through 02/13/2026.  Your patient may now fill this prescription and it will be covered.  Approval Details    Authorization number: [id number]  Authorized from February 13, 2025 to February 13, 2026

## 2025-02-14 NOTE — PATIENT INSTRUCTIONS
ASSESSMENT/PLAN:     Encounter Diagnoses   Name Primary?    Elevated liver enzymes Check blood.    Yes    Primary hypertension Stable. Careful with diet and excercise at least 30 minutes 3-4 times a week. Check blood pressures at different times on different days. Can purchase own blood pressure monitor. If not, check at local pharmacy. Bake foods more and grill occasionally. Avoid fried foods. No salt. Use other seasonings.         Acquired hypothyroidism Stable.        Type 2 diabetes mellitus without complication, without long-term current use of insulin (HCC) higher.  Will finish off last dose of Ozempic and changed to Mounjaro.  Will increase insulin in the meantime.  Call in 2 weeks with sugars.  Has a follow-up with endocrinology PA at [facility] in April 2025.  Has a follow-up with endocrinology here but not until November which is the earliest appointment she can get.  Change to mounjaro 2.5 mg weekly. Careful with diet and excercise at least 30 minutes 3-4 times a week. Check sugars at different times on different dates. Careful with low sugars. Carry something with you and check sugar if can. Can carry aristides cracker, etc. Decrease carbohydrates. But also, careful with fruits and natural sugars.One serving a day and no more than 1 handful every day. Check feet  every AM and careful with sores and ulcers on feet bilaterally. Check eyes every year with dilated eye exam.  Check sugars.  2-hour postmeal should be less than 140s.  Pre-meal should be 's.  Both equally affected A1c.  Discussed importance of glycemic control to prevent complications of diabetes  -Discussed complications of diabetes include retinopathy, neuropathy, nephropathy and cardiovascular disease  -Discussed ABCs of DM  -Discussed importance of SBGM  -Discussed importance of low CHO diet, recommend 45gm per meal or 135gm per day  -UTD with optho, check records.   -Normotensive        Vaccine counseling Up to date.        Vitamin D  deficiency Stable.        Encounter for screening mammogram for malignant neoplasm of breast Normal mammogram. Continue self breast exam every month.         Other insomnia Stick to a sleep schedule. Keep your bedtime and wake time consistent from day to day, including on weekends.   Stay active. Regular activity helps promote a good night's sleep. Schedule exercise at least a few hours before bedtime and avoid stimulating activities before bedtime.   Check your medications. If you take medications regularly, check with your doctor to see if they may be contributing to your insomnia. Also check the labels of OTC products to see if they contain caffeine or other stimulants, such as pseudoephedrine.   Avoid or limit naps. Naps can make it harder to fall asleep at night. If you can't get by without one, try to limit a nap to no more than 30 minutes and don't nap after 3 p.m.   Avoid or limit caffeine and alcohol and don't use nicotine. All of these can make it harder to sleep, and effects can last for several hours.   Avoid large meals and beverages before bed. A light snack is fine and may help avoid heartburn. Drink less liquid before bedtime so that you won't have to urinate as often.  At bedtime:  Try melatonin 10 mg 30 minutes before bed.  Make your bedroom comfortable for sleep. Only use your bedroom for sex or sleep. Keep it dark and quiet, at a comfortable temperature. Hide all clocks in your bedroom, including your wristwatch and cellphone, so you don't worry about what time it is.   Find ways to relax. Try to put your worries and planning aside when you get into bed. A warm bath or a massage before bedtime can help prepare you for sleep. Create a relaxing bedtime ritual, such as taking a hot bath, reading, soft music, breathing exercises, yoga or prayer.   Avoid trying too hard to sleep. The harder you try, the more awake you'll become. Read in another room until you become very drowsy, then go to bed to  sleep. Don't go to bed too early, before you're sleepy.   Get out of bed when you're not sleeping. Sleep as much as you need to feel rested, and then get out of bed. Don't stay in bed if you're not sleeping. Pt. Know to get meds. From me ONLY. Also, patient knows not to take meds. and drive. Careful with grogginess when taking meds. Careful with addiction potential for these drugs.        Back pain.  Cymbalta seems to be helping from all 4 out of 10 pain to 2 or 3 out of 10 pain.  Does not want surgery.  Does not want any more cortisone injections.  Will increase Cymbalta to 20 mg at night.  Since multiple medications and trying to taper.  Will decrease gabapentin to 200 in the morning.  Call with update in 2 weeks.    Orders Placed This Encounter   Procedures    Lipid Panel    Microalb/Creat Ratio, Random Urine    Comp Metabolic Panel (14)       Meds This Visit:  Requested Prescriptions     Signed Prescriptions Disp Refills    zolpidem (AMBIEN) 5 MG Oral Tab 15 tablet 0     Sig: Take 1 tablet (5 mg total) by mouth nightly as needed for Sleep.    levothyroxine 100 MCG Oral Tab 90 tablet 3     Sig: TAKE 1 TABLET BEFORE BREAKFAST    Tirzepatide (MOUNJARO) 2.5 MG/0.5ML Subcutaneous Solution Auto-injector 6 mL 1     Sig: Inject 2.5 mg into the skin once a week.    insulin degludec 200 UNIT/ML Subcutaneous Solution Pen-injector 39 mL 3     Si units qam and 50 units qpm.    Insulin Pen Needle (BD PEN NEEDLE LINA 2ND GEN) 32G X 4 MM Does not apply Misc 90 each 3     Sig: Inject 1 Needle into the skin As Directed.    DULoxetine 40 MG Oral Cap DR Particles 90 capsule 3     Sig: Take 20 mg by mouth at bedtime.    gabapentin 300 MG Oral Cap 270 capsule 3     Si mg in AM.       Imaging & Referrals:  None        Has set ashleigh't.

## 2025-02-17 ENCOUNTER — MED REC SCAN ONLY (OUTPATIENT)
Dept: INTERNAL MEDICINE CLINIC | Facility: CLINIC | Age: 55
End: 2025-02-17

## 2025-02-20 ENCOUNTER — PATIENT MESSAGE (OUTPATIENT)
Dept: INTERNAL MEDICINE CLINIC | Facility: CLINIC | Age: 55
End: 2025-02-20

## 2025-02-21 RX ORDER — DULOXETIN HYDROCHLORIDE 20 MG/1
20 CAPSULE, DELAYED RELEASE ORAL NIGHTLY
Qty: 90 CAPSULE | Refills: 0 | Status: CANCELLED
Start: 2025-02-21

## 2025-02-21 RX ORDER — DULOXETINE 40 MG/1
40 CAPSULE, DELAYED RELEASE ORAL NIGHTLY
Qty: 90 CAPSULE | Refills: 3 | Status: SHIPPED | OUTPATIENT
Start: 2025-02-21 | End: 2025-05-22

## 2025-02-21 NOTE — TELEPHONE ENCOUNTER
Please clarify the following prescription:      Disp Refills Start End    DULoxetine 40 MG Oral Cap  Particles 90 capsule 3 2/13/2025 5/14/2025    Sig - Route: Take 20 mg by mouth at bedtime. - Oral    Sent to pharmacy as: DULoxetine HCl 40 MG Oral Capsule Delayed Release Particles    E-Prescribing Status: Receipt confirmed by pharmacy (2/13/2025  5:38 PM CST)        Patient states that she was told by Dr. Maradiaga during visit on 2/13/25 to take Duloxetine 40 mg nightly- sig is to take 20 mg nightly.    [facility] needs new prescription to be sent.    Please see pended medication for your review and approval.

## 2025-02-25 ENCOUNTER — TELEPHONE (OUTPATIENT)
Dept: INTERNAL MEDICINE CLINIC | Facility: CLINIC | Age: 55
End: 2025-02-25

## 2025-02-25 NOTE — TELEPHONE ENCOUNTER
Pharmacy would like to know how many needles patient uses per day. Advised patient takes insulin every morning and evening. They will change the quantity to 180 each instead of 90 each.        Insulin Pen Needle (BD PEN NEEDLE LINA 2ND GEN) 32G X 4 MM Does not apply Misc 90 each 3 2025 --   Sig:   Inject 1 Needle into the skin As Directed.     Route:   Subcutaneous         insulin degludec 200 UNIT/ML Subcutaneous Solution Pen-injector 39 mL 3 2025 --   Si units qam and 50 units qpm.     Route:   (none)     Class:   Print Script     Order #:   [id number]

## 2025-03-11 ENCOUNTER — PATIENT MESSAGE (OUTPATIENT)
Dept: INTERNAL MEDICINE CLINIC | Facility: CLINIC | Age: 55
End: 2025-03-11

## 2025-03-11 DIAGNOSIS — E11.9 TYPE 2 DIABETES MELLITUS WITHOUT COMPLICATION, WITHOUT LONG-TERM CURRENT USE OF INSULIN (HCC): ICD-10-CM

## 2025-03-11 NOTE — TELEPHONE ENCOUNTER
Please see patient message and advise on if any dose adjustments needed.     Per your last office visit note 2/13/25      Type 2 diabetes mellitus without complication, without long-term current use of insulin (HCC) higher.  Will finish off last dose of Ozempic and changed to Mounjaro.  Will increase insulin in the meantime.  Call in 2 weeks with sugars.  Has a follow-up with endocrinology PA at [facility] in April 2025.  Has a follow-up with endocrinology here but not until November which is the earliest appointment she can get.  Change to mounjaro 2.5 mg weekly. Careful with diet and excercise at least 30 minutes 3-4 times a week. Check sugars at different times on different dates. Careful with low sugars. Carry something with you and check sugar if can. Can carry aristides cracker, etc. Decrease carbohydrates. But also, careful with fruits and natural sugars.One serving a day and no more than 1 handful every day. Check feet  every AM and careful with sores and ulcers on feet bilaterally. Check eyes every year with dilated eye exam.  Check sugars.  2-hour postmeal should be less than 140s.  Pre-meal should be 's.  Both equally affected A1c.  Discussed importance of glycemic control to prevent complications of diabetes  -Discussed complications of diabetes include retinopathy, neuropathy, nephropathy and cardiovascular disease  -Discussed ABCs of DM  -Discussed importance of SBGM  -Discussed importance of low CHO diet, recommend 45gm per meal or 135gm per day  -UTD with optho, check records.   -Normotensive

## 2025-03-13 ENCOUNTER — MED REC SCAN ONLY (OUTPATIENT)
Dept: INTERNAL MEDICINE CLINIC | Facility: CLINIC | Age: 55
End: 2025-03-13

## 2025-03-13 RX ORDER — TIRZEPATIDE 5 MG/.5ML
5 INJECTION, SOLUTION SUBCUTANEOUS WEEKLY
Qty: 6 ML | Refills: 1 | Status: SHIPPED | OUTPATIENT
Start: 2025-03-13

## 2025-03-17 ENCOUNTER — TELEPHONE (OUTPATIENT)
Dept: INTERNAL MEDICINE CLINIC | Facility: CLINIC | Age: 55
End: 2025-03-17

## 2025-03-17 DIAGNOSIS — E11.9 TYPE 2 DIABETES MELLITUS WITHOUT COMPLICATION, WITHOUT LONG-TERM CURRENT USE OF INSULIN (HCC): ICD-10-CM

## 2025-03-20 RX ORDER — ACYCLOVIR 800 MG/1
1 TABLET ORAL
Qty: 9 EACH | Refills: 3 | OUTPATIENT
Start: 2025-03-20

## 2025-03-20 NOTE — TELEPHONE ENCOUNTER
Called and spoke with patient and identified full name and date of birth.  Relayed Dr. Maradiaga's message and patient voiced understanding with treatment plan    Patient has appointment scheduled 4-17-25 with an Endocrinologist at [facility] since she could not get a sooner appointment with Dr Daugherty

## 2025-03-25 RX ORDER — CANDESARTAN 8 MG/1
8 TABLET ORAL 2 TIMES DAILY
Qty: 180 TABLET | Refills: 3 | Status: SHIPPED | OUTPATIENT
Start: 2025-03-25

## 2025-03-25 NOTE — TELEPHONE ENCOUNTER
Please review pended refill request as unable to refill due to high/very high interaction warning copied here:    High  Drug-Drug: spironolactone and Candesartan CilexetilThe risk of hyperkalemia may be increased when potassium-sparing diuretics are co-administered with angiotensin II receptor antagonists.  Details    Requested Prescriptions   Pending Prescriptions Disp Refills    CANDESARTAN CILEXETIL 8 MG Oral Tab [Pharmacy Med Name: CANDESARTAN  8MG  TAB] 180 tablet 3     Sig: TAKE 1 TABLET TWICE A DAY       Hypertension Medications Protocol Passed - 3/25/2025  9:40 AM        Passed - CMP or BMP in past 12 months        Passed - Last BP reading less than 140/90     BP Readings from Last 1 Encounters:   02/13/25 126/76               Passed - In person appointment or virtual visit in the past 12 mos or appointment in next 3 mos     Recent Outpatient Visits              1 month ago Elevated liver enzymes    [facility] Leonila Maradiaga MD    Office Visit    1 month ago [facility]    [facility]    4 months ago Lumbosacral spondylosis with radiculopathy    [facility]    Office Visit    4 months ago Acquired hypothyroidism    [facility] Leonila Maradiaga MD    Office Visit          Future Appointments         Provider Department Appt Notes    In 1 month [facility]    In 7 months Farzana Daugherty MD [facility] diabetes and medications                    Passed - EGFRCR or GFRNAA > 50     GFR Evaluation  EGFRCR: 69 , resulted on 12/23/2024           Passed - Medication is active on med list

## 2025-04-17 ENCOUNTER — HOSPITAL ENCOUNTER (OUTPATIENT)
Age: 55
Discharge: HOME | End: 2025-04-17
Payer: COMMERCIAL

## 2025-04-17 ENCOUNTER — APPOINTMENT (OUTPATIENT)
Dept: GENERAL RADIOLOGY | Age: 55
End: 2025-04-17
Attending: NURSE PRACTITIONER
Payer: COMMERCIAL

## 2025-04-17 VITALS
RESPIRATION RATE: 16 BRPM | DIASTOLIC BLOOD PRESSURE: 60 MMHG | TEMPERATURE: 98 F | OXYGEN SATURATION: 100 % | HEART RATE: 69 BPM | SYSTOLIC BLOOD PRESSURE: 117 MMHG

## 2025-04-17 DIAGNOSIS — S99.921A INJURY OF TOE ON RIGHT FOOT, INITIAL ENCOUNTER: Primary | ICD-10-CM

## 2025-04-17 DIAGNOSIS — S82.892A CLOSED AVULSION FRACTURE OF LEFT ANKLE, INITIAL ENCOUNTER: ICD-10-CM

## 2025-04-17 DIAGNOSIS — S99.912A INJURY OF LEFT ANKLE, INITIAL ENCOUNTER: ICD-10-CM

## 2025-04-17 PROCEDURE — 99213 OFFICE O/P EST LOW 20 MIN: CPT | Performed by: NURSE PRACTITIONER

## 2025-04-17 PROCEDURE — 73610 X-RAY EXAM OF ANKLE: CPT | Performed by: NURSE PRACTITIONER

## 2025-04-17 PROCEDURE — 73660 X-RAY EXAM OF TOE(S): CPT | Performed by: NURSE PRACTITIONER

## 2025-04-17 NOTE — ED PROVIDER NOTES
Patient Seen in: [facility]      History     Chief Complaint   Patient presents with    Toe Injury     Stated Complaint: fall; right ankle pain, left 2nd toe pain    Subjective:   HPI    55yo female p/w left ankle pain and right second toe pain after inversion on uneven concrete on Monday.  States she had surgery on her right second toe with screws.  And wants to have x-rays to make sure that the hardware is intact.  And is also having pain with ambulation on left ankle.  SHe has been a wearing ankle brace to her left ankle to prevent pain and swelling.  Denies need for pain medication at this time.  Ambulatory with slow steady gait.  History of Present Illness               Objective:     Past Medical History:    Calculus of kidney    Cancer (HCC)    kidney    Diabetes (HCC)    Disorder of thyroid    Epicondylitis, lateral    B/L. [facility].     Essential hypertension    High blood pressure    PONV (postoperative nausea and vomiting)    difficulty arousing               Past Surgical History:   Procedure Laterality Date    Appendectomy  2008    Arthroscopy of joint unlisted Left 1-4-16    Labral tear reapir, iliopsoas burscetomy. (hip)    Arthroscopy of joint unlisted Right     bone spur and torn cartilage shoulder    Carpal tunnel release Right 2014    Carpal tunnel release Left 01/2019    [facility].    Cholecystectomy      Colonoscopy  05/2020    normal    Correct bunion,othr methods Right     Foot surgery Right     torn ligament repaired    Nephrectomy Right 10/01/2018    Partial nephrectomy    Other Right 2005    arthroscopy bone spur removal     Other Left 01/2019    Status post Left lateral epicondylar release.  Done by [facility].    Other Right     toe ligament repair                Social History     Socioeconomic History    Marital status:     Number of children: 0   Occupational History    Occupation: Finance.    Tobacco Use    Smoking status: Never     Smokeless tobacco: Never   Vaping Use    Vaping status: Never Used   Substance and Sexual Activity    Alcohol use: Yes     Alcohol/week: 0.0 standard drinks of alcohol     Comment: occassionally     Drug use: No    Sexual activity: Yes     Partners: Male     Birth control/protection: Pill   Other Topics Concern    Caffeine Concern Yes     Comment: 5 8oz of coffee and soda daily    Exercise No    Reaction to local anesthetic No    Pt has a pacemaker No    Pt has a defibrillator No   Social History Narrative    The patient does not use an assistive device..      The patient does live in a home with stairs.              Review of Systems    Positive for stated complaint: fall; right ankle pain, left 2nd toe pain  Other systems are as noted in HPI.  Constitutional and vital signs reviewed.      All other systems reviewed and negative except as noted above.                  Physical Exam     ED Triage Vitals [04/17/25 1143]   /60   Pulse 69   Resp 16   Temp 98.2 °F (36.8 °C)   Temp src Oral   SpO2 100 %   O2 Device None (Room air)       Current Vitals:   Vital Signs  BP: 117/60  Pulse: 69  Resp: 16  Temp: 98.2 °F (36.8 °C)  Temp src: Oral    Oxygen Therapy  SpO2: 100 %  O2 Device: None (Room air)        Physical Exam  Vitals and nursing note reviewed.   Constitutional:       General: She is not in acute distress.     Appearance: She is not toxic-appearing.   HENT:      Head: Normocephalic.      Nose: Nose normal. No congestion or rhinorrhea.      Mouth/Throat:      Mouth: Mucous membranes are moist.   Pulmonary:      Effort: Pulmonary effort is normal. No respiratory distress.   Abdominal:      General: Abdomen is flat.   Musculoskeletal:         General: Normal range of motion.      Cervical back: Normal range of motion.      Left ankle:      Left Achilles Tendon: Normal.        Feet:    Skin:     General: Skin is warm and dry.      Capillary Refill: Capillary refill takes less than 2 seconds.   Neurological:       General: No focal deficit present.      Mental Status: She is alert.           Physical Exam                ED Course   Labs Reviewed - No data to display    ED Course as of 04/17/25 1314  ------------------------------------------------------------  Time: 04/17 1241  Value: XR ANKLE (MIN 3 VIEWS), LEFT (CPT=73610)  Comment: 1. Age indeterminate 6 mm avulsion type fracture at the tip of the medial malleolus.  Request correlation with point tenderness at this site.   2. No other acute fracture or dislocation of the right ankle.   3. Medial greater than lateral soft tissue swelling.   4. 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     ------------------------------------------------------------  Time: 04/17 1241  Value: XR TOE(S) (MIN 2 VIEWS), RIGHT 2ND (CPT=73660)  Comment: CONCLUSION:   1. No radiographically visible acute osseous injury of the right 2nd toe.   2. Diffuse soft tissue swelling.            Results                                 MDM              Medical Decision Making  54-year-old female presents with right second toe pain, and left lateral ankle pain.    Xray of right second toe and x-ray of left ankle>I have directly viewed this exam, Negative per my read for acute fracture/dislocation.  Pending rad read.     Xray> XR TOE(S) (MIN 2 VIEWS), RIGHT 2ND (CPT=73660)  Result Date: 4/17/2025  CONCLUSION:  1. No radiographically visible acute osseous injury of the right 2nd toe. 2. Diffuse soft tissue swelling.   Dictated by (CST): Vasile Bhatia MD on 4/17/2025 at 12:35 PM     Finalized by (CST): Vasile Bhatia MD on 4/17/2025 at 12:36 PM          XR ANKLE (MIN 3 VIEWS), LEFT (CPT=73610)  Result Date: 4/17/2025  CONCLUSION:  1. Age indeterminate 6 mm avulsion type fracture at the tip of the medial malleolus.  Request correlation with point tenderness at this site. 2. No other acute fracture or dislocation of the right ankle. 3. Medial greater than lateral soft tissue swelling. 4.  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   Dictated by (CST): Vasile Bhatia MD on 4/17/2025 at 12:31 PM     Finalized by (CST): Vasile Bhatia MD on 4/17/2025 at 12:33 PM           Discussed x-ray findings.  Patient already wearing Aircast.  Denies need for crutches at this time.  Discussed supportive care including RICE instructions.  Physical exam remained stable over serial reexaminations as previously documented. External chart review completed.     I have discussed with the patient the results of tests, differential diagnosis, and warning signs and symptoms that should prompt immediate return.  The patient understands these instructions and agrees to the follow-up plan provided.  There are no barriers to learning.   Appropriate f/u given.  Patient agrees to return for any concerns/problems/complications.  Differential diagnosis reflecting the complexity of care include: Right toe fracture, right toe sprain, right toe ecchymosis, right ankle sprain, right ankle fracture    Comorbidities that add complexity to management include: Previous internal fixation of right second toe and right ankle    External chart review was done and was noted:Yes    History obtained by an independent source was from: Patient    Discussions of management was done with:Patient    My independent interpretation of studies of:Xray    Diagnostic tests and medications considered but not ordered were: N/A    Social determinants of health that affect care:NA    Shared decision making was done by Self, Patient        Disposition and Plan     Clinical Impression:  1. Injury of toe on right foot, initial encounter    2. Injury of left ankle, initial encounter    3. Closed avulsion fracture of left ankle, initial encounter         Disposition:  Discharge  4/17/2025 12:47 pm    Follow-up:  Leonila Maradiaga MD  [address] [facility] [address]  [phone]          Tay Matute MD  [address] [facility]  [address]  [facility] [address]  [phone]                Medications Prescribed:  Discharge Medication List as of 4/17/2025  1:11 PM          Supplementary Documentation:

## 2025-04-17 NOTE — DISCHARGE INSTRUCTIONS
REFER TO HANDOUT FOR FURTHER DISCHARGE CARE.  -Take medications as directed for pain relief. May continue to take your own Norco for severe  pain.  -Apply ice or heat to XX to relieve pain.    -Non-weight bearing until you follow up in ortho clinic  -Remove splint immediately if your toes are cold, turning blue, decreased capillary refill,  increased pain out of proportion to your injury  COME BACK IMMEDIATELY TO THE ER TO HAVE SPLINT REPLACED.      REFER TO HANDOUT FOR FURTHER DISCHARGE CARE.  -Take medications as directed for pain relief.     -Apply ice or heat to ankle to relieve pain.  -epsom salt soaks for pain  --may alternate ibuprofen and tylenol for pain and fever

## 2025-04-17 NOTE — ED INITIAL ASSESSMENT (HPI)
Here for eval of R 2nd toe injury and L ankle roll on Monday. Pt was at a work location when she walked over an uneven surface and fell.

## 2025-04-22 RX ORDER — LEVOTHYROXINE SODIUM 100 UG/1
TABLET ORAL
Qty: 90 TABLET | Refills: 3 | Status: SHIPPED | OUTPATIENT
Start: 2025-04-22 | End: 2025-04-23

## 2025-04-28 RX ORDER — SPIRONOLACTONE 100 MG/1
TABLET, FILM COATED ORAL
Qty: 135 TABLET | Refills: 3 | Status: SHIPPED | OUTPATIENT
Start: 2025-04-28

## 2025-04-28 NOTE — TELEPHONE ENCOUNTER
Please review pended refill request as unable to refill due to high/very high interaction warning copied here:    Very High  Drug-Drug: spironolactone and Candesartan CilexetilAngiotensin II receptor blockers, such as Angiotensin II Receptor Antagonists, may enhance the hyperkalemic effect of potassium-sparing diuretics (eg, Potassium-Sparing Diuretics).        Recent Visits  Date Type Provider Dept   02/13/25 Office Visit Leonila Maradiaga MD [id number]   11/08/24 Office Visit Leonila Maradiaga MD [facility]   08/08/24 Office Visit Leonila Maradiaga MD [id number]   05/02/24 Office Visit Leonila Maradiaga MD [id number]   02/05/24 Office Visit Leonila Maradiaga MD [id number]   Showing recent visits within past 540 days with a meds authorizing provider and meeting all other requirements  Future Appointments  Date Type Provider Dept   05/08/25 Appointment Leonila Maradiaga MD [id number]   Showing future appointments within next 150 days with a meds authorizing provider and meeting all other requirements

## 2025-04-28 NOTE — TELEPHONE ENCOUNTER
Refill Per Protocol     Requested Prescriptions   Pending Prescriptions Disp Refills    SPIRONOLACTONE 100 MG Oral Tab [Pharmacy Med Name: Spironolactone 100 MG Oral Tablet] 135 tablet 3     Sig: TAKE 1 TABLET EVERY MORNING AND ONE-HALF (1/2) TABLET IN THE EVENING       Hypertension Medications Protocol Passed - 4/28/2025  9:20 AM        Passed - CMP or BMP in past 12 months        Passed - Last BP reading less than 140/90     BP Readings from Last 1 Encounters:   04/17/25 117/60               Passed - In person appointment or virtual visit in the past 12 mos or appointment in next 3 mos     Recent Outpatient Visits              2 months ago Elevated liver enzymes    [facility] Leonila Maradiaga MD    Office Visit    2 months ago [facility]bar spondylosis    [facility]    Office Visit    3 months ago Psoriasis vulgaris    [facility] Yara Castillo MD    Office Visit    5 months ago Lumbosacral spondylosis with radiculopathy    [facility] Adan Nicolas MD    Office Visit    5 months ago Acquired hypothyroidism    [place] [place] Leonila Maradiaga MD    Office Visit          Future Appointments         Provider Department Appt Notes    In 1 week Leonila Maradiaga MD [facility] Annual Physical    In 2 months [facility] MRI RM1 (1.5T WIDE) [facility]     In 6 months Farzana Daugherty MD [facility] diabetes and medications                    Passed - EGFRCR or GFRNAA > 50     GFR Evaluation  EGFRCR: 69 , resulted on 12/23/2024          Passed - Medication is active on med list

## 2025-04-30 PROBLEM — Z85.528 HISTORY OF RENAL CELL CARCINOMA: Status: ACTIVE | Noted: 2025-04-30

## 2025-04-30 PROBLEM — C64.1 RENAL CELL CARCINOMA OF RIGHT KIDNEY (HCC): Status: RESOLVED | Noted: 2019-01-12 | Resolved: 2025-04-30

## 2025-05-07 PROBLEM — S82.839A CLOSED FRACTURE OF DISTAL FIBULA: Status: RESOLVED | Noted: 2023-07-17 | Resolved: 2025-05-07

## 2025-05-07 PROBLEM — S82.63XA: Status: RESOLVED | Noted: 2023-10-30 | Resolved: 2025-05-07

## 2025-05-07 RX ORDER — INSULIN GLARGINE 300 U/ML
INJECTION, SOLUTION SUBCUTANEOUS
COMMUNITY
Start: 2025-04-22

## 2025-05-07 RX ORDER — TIRZEPATIDE 7.5 MG/.5ML
INJECTION, SOLUTION SUBCUTANEOUS
COMMUNITY
Start: 2025-04-18

## 2025-05-08 ENCOUNTER — OFFICE VISIT (OUTPATIENT)
Dept: INTERNAL MEDICINE CLINIC | Facility: CLINIC | Age: 55
End: 2025-05-08

## 2025-05-08 VITALS
HEART RATE: 77 BPM | DIASTOLIC BLOOD PRESSURE: 78 MMHG | TEMPERATURE: 97 F | BODY MASS INDEX: 42.75 KG/M2 | WEIGHT: 266 LBS | OXYGEN SATURATION: 100 % | SYSTOLIC BLOOD PRESSURE: 130 MMHG | HEIGHT: 66 IN

## 2025-05-08 DIAGNOSIS — H02.403 PTOSIS OF BOTH EYELIDS: ICD-10-CM

## 2025-05-08 DIAGNOSIS — K59.00 CONSTIPATION, UNSPECIFIED CONSTIPATION TYPE: ICD-10-CM

## 2025-05-08 DIAGNOSIS — K76.0 FATTY LIVER DISEASE, NONALCOHOLIC: ICD-10-CM

## 2025-05-08 DIAGNOSIS — N13.39 OTHER HYDRONEPHROSIS: ICD-10-CM

## 2025-05-08 DIAGNOSIS — M54.42 CHRONIC LOW BACK PAIN WITH BILATERAL SCIATICA, UNSPECIFIED BACK PAIN LATERALITY: ICD-10-CM

## 2025-05-08 DIAGNOSIS — E03.9 ACQUIRED HYPOTHYROIDISM: ICD-10-CM

## 2025-05-08 DIAGNOSIS — M54.41 CHRONIC LOW BACK PAIN WITH BILATERAL SCIATICA, UNSPECIFIED BACK PAIN LATERALITY: ICD-10-CM

## 2025-05-08 DIAGNOSIS — G56.01 CARPAL TUNNEL SYNDROME OF RIGHT WRIST: ICD-10-CM

## 2025-05-08 DIAGNOSIS — G57.61 MORTON NEUROMA, RIGHT: ICD-10-CM

## 2025-05-08 DIAGNOSIS — R74.8 ELEVATED LIVER ENZYMES: ICD-10-CM

## 2025-05-08 DIAGNOSIS — E55.9 VITAMIN D DEFICIENCY: ICD-10-CM

## 2025-05-08 DIAGNOSIS — Z12.31 ENCOUNTER FOR SCREENING MAMMOGRAM FOR MALIGNANT NEOPLASM OF BREAST: ICD-10-CM

## 2025-05-08 DIAGNOSIS — G89.29 CHRONIC LOW BACK PAIN WITH BILATERAL SCIATICA, UNSPECIFIED BACK PAIN LATERALITY: ICD-10-CM

## 2025-05-08 DIAGNOSIS — E11.9 TYPE 2 DIABETES MELLITUS WITHOUT COMPLICATION, WITHOUT LONG-TERM CURRENT USE OF INSULIN (HCC): ICD-10-CM

## 2025-05-08 DIAGNOSIS — N18.2 CHRONIC KIDNEY DISEASE (CKD) STAGE G2/A2, MILDLY DECREASED GLOMERULAR FILTRATION RATE (GFR) BETWEEN 60-89 ML/MIN/1.73 SQUARE METER AND ALBUMINURIA CREATININE RATIO BETWEEN 30-299 MG/G: ICD-10-CM

## 2025-05-08 DIAGNOSIS — Z71.85 VACCINE COUNSELING: ICD-10-CM

## 2025-05-08 DIAGNOSIS — Z00.00 ADULT GENERAL MEDICAL EXAM: Primary | ICD-10-CM

## 2025-05-08 DIAGNOSIS — E04.2 MULTINODULAR GOITER: ICD-10-CM

## 2025-05-08 DIAGNOSIS — Z90.5 STATUS POST NEPHRECTOMY: ICD-10-CM

## 2025-05-08 DIAGNOSIS — G43.009 MIGRAINE WITHOUT AURA AND WITHOUT STATUS MIGRAINOSUS, NOT INTRACTABLE: ICD-10-CM

## 2025-05-08 DIAGNOSIS — I10 PRIMARY HYPERTENSION: ICD-10-CM

## 2025-05-08 DIAGNOSIS — M25.552 PAIN OF LEFT HIP JOINT: ICD-10-CM

## 2025-05-08 DIAGNOSIS — Z85.528 HISTORY OF RENAL CELL CARCINOMA: ICD-10-CM

## 2025-05-08 DIAGNOSIS — Z12.11 COLON CANCER SCREENING: ICD-10-CM

## 2025-05-08 DIAGNOSIS — M48.04 THORACIC SPINAL STENOSIS: ICD-10-CM

## 2025-05-08 DIAGNOSIS — E28.2 PCOS (POLYCYSTIC OVARIAN SYNDROME): ICD-10-CM

## 2025-05-08 DIAGNOSIS — K86.2 PANCREATIC CYST (HCC): ICD-10-CM

## 2025-05-08 DIAGNOSIS — H04.123 DRY EYES: ICD-10-CM

## 2025-05-08 LAB
APPEARANCE: CLEAR
BILIRUBIN: NEGATIVE
CREAT UR-SCNC: 117.5 MG/DL
GLUCOSE (URINE DIPSTICK): 250 MG/DL
KETONES (URINE DIPSTICK): NEGATIVE MG/DL
LEUKOCYTES: NEGATIVE
MICROALBUMIN UR-MCNC: <0.3 MG/DL
MULTISTIX EXPIRATION DATE: ABNORMAL DATE
MULTISTIX LOT#: ABNORMAL NUMERIC
NITRITE, URINE: NEGATIVE
OCCULT BLOOD: NEGATIVE
PH, URINE: 6 (ref 4.5–8)
PROTEIN (URINE DIPSTICK): NEGATIVE MG/DL
SPECIFIC GRAVITY: 1.02 (ref 1–1.03)
UROBILINOGEN,SEMI-QN: 0.2 MG/DL (ref 0–1.9)

## 2025-05-08 PROCEDURE — 3078F DIAST BP <80 MM HG: CPT | Performed by: INTERNAL MEDICINE

## 2025-05-08 PROCEDURE — 3075F SYST BP GE 130 - 139MM HG: CPT | Performed by: INTERNAL MEDICINE

## 2025-05-08 PROCEDURE — 3046F HEMOGLOBIN A1C LEVEL >9.0%: CPT | Performed by: INTERNAL MEDICINE

## 2025-05-08 PROCEDURE — 99499 UNLISTED E&M SERVICE: CPT | Performed by: INTERNAL MEDICINE

## 2025-05-08 PROCEDURE — 3061F NEG MICROALBUMINURIA REV: CPT | Performed by: INTERNAL MEDICINE

## 2025-05-08 PROCEDURE — 99213 OFFICE O/P EST LOW 20 MIN: CPT | Performed by: INTERNAL MEDICINE

## 2025-05-08 PROCEDURE — 3008F BODY MASS INDEX DOCD: CPT | Performed by: INTERNAL MEDICINE

## 2025-05-08 PROCEDURE — 99396 PREV VISIT EST AGE 40-64: CPT | Performed by: INTERNAL MEDICINE

## 2025-05-08 PROCEDURE — 81003 URINALYSIS AUTO W/O SCOPE: CPT | Performed by: INTERNAL MEDICINE

## 2025-05-08 RX ORDER — PREGABALIN 100 MG/1
100 CAPSULE ORAL 2 TIMES DAILY
Qty: 180 CAPSULE | Refills: 2 | Status: SHIPPED | OUTPATIENT
Start: 2025-05-08

## 2025-05-08 NOTE — PROGRESS NOTES
HPI:    Patient ID: Rekha Moreno is a 54 year old female.    Rekha Moreno is a 54 year old female who presents for a complete physical exam.   HPI:     Chief Complaint   Patient presents with    Physical     Patient states she is here for an Annual Physical Examination.         No LMP recorded (lmp unknown). Patient is premenopausal.    /78 (BP Location: Left arm, Patient Position: Sitting, Cuff Size: large)   Pulse 77   Temp 97.1 °F (36.2 °C) (Temporal)   Ht 5' 6\" (1.676 m)   Wt 266 lb (120.7 kg)   LMP  (LMP Unknown)   SpO2 100%   BMI 42.93 kg/m²    Wt Readings from Last 4 Encounters:   05/08/25 266 lb (120.7 kg)   02/13/25 261 lb 6.4 oz (118.6 kg)   01/30/25 254 lb (115.2 kg)   11/19/24 250 lb (113.4 kg)     Body mass index is 42.93 kg/m².     Labs:   Lab Results   Component Value Date/Time    WBC 8.5 12/23/2024 11:00 AM    HGB 15.5 12/23/2024 11:00 AM    .0 12/23/2024 11:00 AM      Lab Results   Component Value Date/Time     (H) 12/23/2024 11:00 AM     12/23/2024 11:00 AM    K 4.0 12/23/2024 11:00 AM     12/23/2024 11:00 AM    CO2 26.0 12/23/2024 11:00 AM    CREATSERUM 0.97 12/23/2024 11:00 AM    CA 9.9 12/23/2024 11:00 AM    ALB 4.2 12/23/2024 11:00 AM    TP 6.8 12/23/2024 11:00 AM    ALKPHO 86 12/23/2024 11:00 AM    AST 50 (H) 12/23/2024 11:00 AM    ALT 55 (H) 12/23/2024 11:00 AM    BILT 0.7 12/23/2024 11:00 AM    TSH 0.882 12/23/2024 11:00 AM    T4F 1.1 12/23/2024 11:00 AM        Lab Results   Component Value Date/Time    CHOLEST 104 02/09/2024 09:04 AM    HDL 47 02/09/2024 09:04 AM    TRIG 74 02/09/2024 09:04 AM    LDL 42 02/09/2024 09:04 AM    NONHDLC 57 02/09/2024 09:04 AM       Lab Results   Component Value Date/Time    A1C 8.8 (H) 12/23/2024 11:00 AM      Lab Results   Component Value Date    VITD 42.6 12/23/2024         Health Maintenance   Topic Date Due    Mammogram  08/08/2025       Current Outpatient Medications   Medication Sig Dispense  Refill    pregabalin (LYRICA) 100 MG Oral Cap Take 1 capsule (100 mg total) by mouth 2 (two) times daily. 180 capsule 2    TOUJEO SOLOSTAR 300 UNIT/ML Subcutaneous Solution Pen-injector       MOUNJARO 7.5 MG/0.5ML Subcutaneous Solution Auto-injector INJECT 7.5 MG SUBCUTANEOUSLY WEEKLY *TOO SOON      spironolactone 100 MG Oral Tab TAKE 1 TABLET EVERY MORNING AND ONE-HALF (1/2) TABLET IN THE EVENING 135 tablet 3    levothyroxine 100 MCG Oral Tab TAKE 1 TABLET BEFORE BREAKFAST 90 tablet 3    Candesartan Cilexetil 8 MG Oral Tab Take 1 tablet (8 mg total) by mouth 2 (two) times daily. 180 tablet 3    Tirzepatide (MOUNJARO) 5 MG/0.5ML Subcutaneous Solution Auto-injector Inject 5 mg into the skin once a week. 6 mL 1    insulin degludec 200 UNIT/ML Subcutaneous Solution Pen-injector 60 units qam and 60 units qpm. 39 mL 3    DULoxetine HCl 40 MG Oral Cap DR Particles Take 40 mg by mouth at bedtime. 90 capsule 3    zolpidem (AMBIEN) 5 MG Oral Tab Take 1 tablet (5 mg total) by mouth nightly as needed for Sleep. 15 tablet 0    Insulin Pen Needle (BD PEN NEEDLE LINA 2ND GEN) 32G X 4 MM Does not apply Misc Inject 1 Needle into the skin As Directed. 90 each 3    gabapentin 300 MG Oral Cap 300 mg in AM. 270 capsule 3    Mometasone Furoate 0.1 % External Cream Apply 1 Application topically daily. Apply a thin film to the affected area(s). 45 g 1    rosuvastatin 5 MG Oral Tab Take 1 tablet (5 mg total) by mouth nightly. 90 tablet 3    amLODIPine 2.5 MG Oral Tab Take 1 tablet (2.5 mg total) by mouth 2 (two) times daily. 180 tablet 3    atenolol 25 MG Oral Tab Take 1 tablet (25 mg total) by mouth nightly. 90 tablet 3    Continuous Glucose Sensor (FREESTYLE MARCELLE 3 SENSOR) Does not apply Misc 1 each every 14 (fourteen) days. 24 each 3    KRILL OIL OR Take 1 capsule by mouth daily.      Calcium Carbonate-Vitamin D (CALCIUM + D OR) Take by mouth.      famotidine 10 MG Oral Tab Take 1 tablet (10 mg total) by mouth 2 (two) times daily. 60  tablet 1    Multiple Vitamins-Minerals (MULTIVITAMIN OR) Take by mouth daily.        Olopatadine HCl 0.1 % Ophthalmic Solution Place 1 drop into both eyes 2 (two) times daily as needed.        Past Medical History:    Calculus of kidney    Cancer (HCC)    kidney    Closed fracture of distal fibula    Closed fracture of proximal lateral malleolus    Diabetes (HCC)    Disorder of thyroid    Epicondylitis, lateral    B/L. [facility].     Essential hypertension    High blood pressure    PONV (postoperative nausea and vomiting)    difficulty arousing       Past Surgical History:   Procedure Laterality Date    Appendectomy  2008    Arthroscopy of joint unlisted Left 1-4-16    Labral tear reapir, iliopsoas burscetomy. (hip)    Arthroscopy of joint unlisted Right     bone spur and torn cartilage shoulder    Carpal tunnel release Right 2014    Carpal tunnel release Left 01/2019    [facility].    Cholecystectomy      Colonoscopy  05/2020    normal    Correct bunion,othr methods Right     Foot surgery Right     torn ligament repaired    Nephrectomy Right 10/01/2018    Partial nephrectomy    Other Right 2005    arthroscopy bone spur removal     Other Left 01/2019    Status post Left lateral epicondylar release.  Done by [facility].    Other Right     toe ligament repair      Family History   Problem Relation Age of Onset    Diabetes Father     Arthritis Father     Hypertension Father     Heart Disease Father 75        aortic valve disease    Cancer Father         Skin cancer.     Pacemaker Father     Arthritis Mother     Cancer Mother         lung, skin and MDS    Hypertension Mother     Heart Disorder Mother 60        CAD S/P MI.     Cancer Brother         Hodgkin's    Hypertension Brother     Cancer Maternal Grandmother         Hepatoma.     Cancer Paternal Uncle         Stomach.       Social History:  Social History     Socioeconomic History    Marital status:     Number of children: 0    Occupational History    Occupation: Finance.    Tobacco Use    Smoking status: Never    Smokeless tobacco: Never   Vaping Use    Vaping status: Never Used   Substance and Sexual Activity    Alcohol use: Yes     Alcohol/week: 0.0 standard drinks of alcohol     Comment: occassionally     Drug use: No    Sexual activity: Yes     Partners: Male     Birth control/protection: Pill   Other Topics Concern    Caffeine Concern Yes     Comment: 5 8oz of coffee and soda daily    Exercise No    Reaction to local anesthetic No    Pt has a pacemaker No    Pt has a defibrillator No           GYNE HISTORY:  OB History    Para Term  AB Living   0 0 0 0 0 0   SAB IAB Ectopic Multiple Live Births   0 0 0 0 0        Hx of Pap: all Paps normal            Patient here for follow up of Diabetes.  Has been taking medications regularly.    Checks sugars several  times daily. Endoc. Increase mounjaro to 7.5 X 3 weeks.   Fasting sugars average 177 this AM. Now. 287. No lows. Injectiosn not help.   Watches diabetic diet, low salt.  Diabetic Flow sheet      Latest Ref Rng & Units 2025     4:17 PM 2025     3:52 PM 2025     1:11 PM 2025    11:43 AM 2025    11:40 AM 2025    12:00 AM 3/25/2025    12:00 AM   DIABETIC FLOWSHEET ([facility])   BMI   42.93 kg/m2        Hgb A1c 4 - 5.6 %      10.2     HEMOGLOBIN A1c 4 - 5.6 %      10.2     Candesartan Cilexetil  8 mg BID OR    8 mg BID OR     Admitted Admitted Admitted 8 mg BID OR     8 mg BID OR       Weight (enc vitals)   266 lb        BP (enc vitals)   130/78  117/60          Medication marked as long-term      Hypertension  Patient is here for follow up of hypertension. BP at home: not check.   Has been compliant with medications.  Exercise level: trying to do more and has been following low salt diet.  Weight has been stable.  Wt Readings from Last 3 Encounters:   25 266 lb (120.7 kg)   25 261 lb 6.4 oz (118.6 kg)   25 254 lb (115.2 kg)     BP  Readings from Last 3 Encounters:   05/08/25 130/78   04/17/25 117/60   02/13/25 126/76       Labs:   Lab Results   Component Value Date/Time     (H) 12/23/2024 11:00 AM     12/23/2024 11:00 AM    K 4.0 12/23/2024 11:00 AM     12/23/2024 11:00 AM    CO2 26.0 12/23/2024 11:00 AM    CREATSERUM 0.97 12/23/2024 11:00 AM    CA 9.9 12/23/2024 11:00 AM    AST 50 (H) 12/23/2024 11:00 AM    ALT 55 (H) 12/23/2024 11:00 AM    TSH 0.882 12/23/2024 11:00 AM    T4F 1.1 12/23/2024 11:00 AM        Lab Results   Component Value Date/Time    CHOLEST 104 02/09/2024 09:04 AM    HDL 47 02/09/2024 09:04 AM    TRIG 74 02/09/2024 09:04 AM    LDL 42 02/09/2024 09:04 AM    NONHDLC 57 02/09/2024 09:04 AM          Labs:   Lab Results   Component Value Date/Time     (H) 12/23/2024 11:00 AM     12/23/2024 11:00 AM    K 4.0 12/23/2024 11:00 AM     12/23/2024 11:00 AM    CO2 26.0 12/23/2024 11:00 AM    CREATSERUM 0.97 12/23/2024 11:00 AM    CA 9.9 12/23/2024 11:00 AM    AST 50 (H) 12/23/2024 11:00 AM    ALT 55 (H) 12/23/2024 11:00 AM    TSH 0.882 12/23/2024 11:00 AM    T4F 1.1 12/23/2024 11:00 AM        Lab Results   Component Value Date/Time    CHOLEST 104 02/09/2024 09:04 AM    HDL 47 02/09/2024 09:04 AM    TRIG 74 02/09/2024 09:04 AM    LDL 42 02/09/2024 09:04 AM    NONHDLC 57 02/09/2024 09:04 AM          HPI      Review of Systems   Constitutional:  Positive for activity change (Sprained L ankle and decreased statrted back with treadmill at gym X 30 minutes and weights.). Negative for appetite change, chills, diaphoresis, fatigue, fever and unexpected weight change.   HENT:  Negative for congestion, dental problem, drooling, ear discharge, ear pain, facial swelling, hearing loss, mouth sores, nosebleeds, postnasal drip, rhinorrhea, sinus pressure, sinus pain, sneezing, sore throat, tinnitus, trouble swallowing and voice change.    Eyes:  Negative for photophobia, pain, discharge, redness, itching and visual  disturbance.   Respiratory:  Negative for apnea, cough, choking, chest tightness, shortness of breath, wheezing and stridor.    Cardiovascular:  Negative for chest pain, palpitations and leg swelling.   Gastrointestinal:  Negative for abdominal distention, abdominal pain, anal bleeding, blood in stool, constipation, diarrhea, nausea, rectal pain and vomiting.   Endocrine: Negative for cold intolerance, heat intolerance, polydipsia, polyphagia and polyuria.   Genitourinary:  Negative for decreased urine volume, difficulty urinating, dysuria, flank pain, frequency, hematuria, menstrual problem, pelvic pain, urgency, vaginal bleeding, vaginal discharge and vaginal pain.   Skin:  Negative for rash.   Neurological:  Negative for dizziness, tremors, seizures, syncope, facial asymmetry, speech difficulty, weakness, light-headedness, numbness and headaches.   Psychiatric/Behavioral:  Negative for agitation, behavioral problems, confusion, decreased concentration, dysphoric mood, hallucinations, self-injury, sleep disturbance and suicidal ideas. The patient is not nervous/anxious and is not hyperactive.    All other systems reviewed and are negative.        Current Outpatient Medications   Medication Sig Dispense Refill    pregabalin (LYRICA) 100 MG Oral Cap Take 1 capsule (100 mg total) by mouth 2 (two) times daily. 180 capsule 2    TOUJEO SOLOSTAR 300 UNIT/ML Subcutaneous Solution Pen-injector       MOUNJARO 7.5 MG/0.5ML Subcutaneous Solution Auto-injector INJECT 7.5 MG SUBCUTANEOUSLY WEEKLY *TOO SOON      spironolactone 100 MG Oral Tab TAKE 1 TABLET EVERY MORNING AND ONE-HALF (1/2) TABLET IN THE EVENING 135 tablet 3    levothyroxine 100 MCG Oral Tab TAKE 1 TABLET BEFORE BREAKFAST 90 tablet 3    Candesartan Cilexetil 8 MG Oral Tab Take 1 tablet (8 mg total) by mouth 2 (two) times daily. 180 tablet 3    Tirzepatide (MOUNJARO) 5 MG/0.5ML Subcutaneous Solution Auto-injector Inject 5 mg into the skin once a week. 6 mL 1     insulin degludec 200 UNIT/ML Subcutaneous Solution Pen-injector 60 units qam and 60 units qpm. 39 mL 3    DULoxetine HCl 40 MG Oral Cap DR Particles Take 40 mg by mouth at bedtime. 90 capsule 3    zolpidem (AMBIEN) 5 MG Oral Tab Take 1 tablet (5 mg total) by mouth nightly as needed for Sleep. 15 tablet 0    Insulin Pen Needle (BD PEN NEEDLE LINA 2ND GEN) 32G X 4 MM Does not apply Misc Inject 1 Needle into the skin As Directed. 90 each 3    gabapentin 300 MG Oral Cap 300 mg in AM. 270 capsule 3    Mometasone Furoate 0.1 % External Cream Apply 1 Application topically daily. Apply a thin film to the affected area(s). 45 g 1    rosuvastatin 5 MG Oral Tab Take 1 tablet (5 mg total) by mouth nightly. 90 tablet 3    amLODIPine 2.5 MG Oral Tab Take 1 tablet (2.5 mg total) by mouth 2 (two) times daily. 180 tablet 3    atenolol 25 MG Oral Tab Take 1 tablet (25 mg total) by mouth nightly. 90 tablet 3    Continuous Glucose Sensor (FREESTYLE MARCELLE 3 SENSOR) Does not apply Misc 1 each every 14 (fourteen) days. 24 each 3    KRILL OIL OR Take 1 capsule by mouth daily.      Calcium Carbonate-Vitamin D (CALCIUM + D OR) Take by mouth.      famotidine 10 MG Oral Tab Take 1 tablet (10 mg total) by mouth 2 (two) times daily. 60 tablet 1    Multiple Vitamins-Minerals (MULTIVITAMIN OR) Take by mouth daily.        Olopatadine HCl 0.1 % Ophthalmic Solution Place 1 drop into both eyes 2 (two) times daily as needed.       Allergies:  Allergies   Allergen Reactions    Lisinopril Coughing    Metformin DIARRHEA       HISTORY:  Past Medical History:    Calculus of kidney    Cancer (HCC)    kidney    Closed fracture of distal fibula    Closed fracture of proximal lateral malleolus    Diabetes (HCC)    Disorder of thyroid    Epicondylitis, lateral    [facility]/L. [facility].     Essential hypertension    High blood pressure    PONV (postoperative nausea and vomiting)    difficulty arousing       Past Surgical History:   Procedure Laterality Date     Appendectomy  2008    Arthroscopy of joint unlisted Left 1-4-16    Labral tear reapir, iliopsoas burscetomy. (hip)    Arthroscopy of joint unlisted Right     bone spur and torn cartilage shoulder    Carpal tunnel release Right 2014    Carpal tunnel release Left 01/2019    [facility].    Cholecystectomy      Colonoscopy  05/2020    normal    Correct bunion,othr methods Right     Foot surgery Right     torn ligament repaired    Nephrectomy Right 10/01/2018    Partial nephrectomy    Other Right 2005    arthroscopy bone spur removal     Other Left 01/2019    Status post Left lateral epicondylar release.  Done by [facility].    Other Right     toe ligament repair      Family History   Problem Relation Age of Onset    Diabetes Father     Arthritis Father     Hypertension Father     Heart Disease Father 75        aortic valve disease    Cancer Father         Skin cancer.     Pacemaker Father     Arthritis Mother     Cancer Mother         lung, skin and MDS    Hypertension Mother     Heart Disorder Mother 60        CAD S/P MI.     Cancer Brother         Hodgkin's    Hypertension Brother     Cancer Maternal Grandmother         Hepatoma.     Cancer Paternal Uncle         Stomach.       Social History:   Social History     Socioeconomic History    Marital status:     Number of children: 0   Occupational History    Occupation: Finance.    Tobacco Use    Smoking status: Never    Smokeless tobacco: Never   Vaping Use    Vaping status: Never Used   Substance and Sexual Activity    Alcohol use: Yes     Alcohol/week: 0.0 standard drinks of alcohol     Comment: occassionally     Drug use: No    Sexual activity: Yes     Partners: Male     Birth control/protection: Pill   Other Topics Concern    Caffeine Concern Yes     Comment: 5 8oz of coffee and soda daily    Exercise No    Reaction to local anesthetic No    Pt has a pacemaker No    Pt has a defibrillator No   Social History Narrative    The patient  does not use an assistive device..      The patient does live in a home with stairs.        PHYSICAL EXAM:   /78 (BP Location: Left arm, Patient Position: Sitting, Cuff Size: large)   Pulse 77   Temp 97.1 °F (36.2 °C) (Temporal)   Ht 5' 6\" (1.676 m)   Wt 266 lb (120.7 kg)   LMP  (LMP Unknown)   SpO2 100%   BMI 42.93 kg/m²   BP Readings from Last 3 Encounters:   05/08/25 130/78   04/17/25 117/60   02/13/25 126/76     Wt Readings from Last 3 Encounters:   05/08/25 266 lb (120.7 kg)   02/13/25 261 lb 6.4 oz (118.6 kg)   01/30/25 254 lb (115.2 kg)       Physical Exam  Vitals and nursing note reviewed.   Constitutional:       General: She is not in acute distress.     Appearance: Normal appearance. She is well-developed and well-groomed. She is not ill-appearing, toxic-appearing or diaphoretic.      Interventions: She is not intubated.  HENT:      Head: Normocephalic and atraumatic.      Right Ear: Tympanic membrane, ear canal and external ear normal. No decreased hearing noted. No laceration, drainage, swelling or tenderness. No middle ear effusion. There is no impacted cerumen. No foreign body. No mastoid tenderness. No PE tube. No hemotympanum. Tympanic membrane is not injected, scarred, perforated, erythematous, retracted or bulging. Tympanic membrane has normal mobility.      Left Ear: Tympanic membrane, ear canal and external ear normal. No decreased hearing noted. No laceration, drainage, swelling or tenderness.  No middle ear effusion. There is no impacted cerumen. No foreign body. No mastoid tenderness. No PE tube. No hemotympanum. Tympanic membrane is not injected, scarred, perforated, erythematous, retracted or bulging. Tympanic membrane has normal mobility.      Nose:      Right Sinus: No maxillary sinus tenderness or frontal sinus tenderness.      Left Sinus: No maxillary sinus tenderness or frontal sinus tenderness.      Mouth/Throat:      Lips: Pink. No lesions.      Mouth: Mucous membranes  are moist. No injury, lacerations, oral lesions or angioedema.      Dentition: No dental tenderness, gingival swelling, dental abscesses or gum lesions.      Tongue: No lesions. Tongue does not deviate from midline.      Palate: No mass and lesions.      Pharynx: Oropharynx is clear. Uvula midline. No pharyngeal swelling, oropharyngeal exudate, posterior oropharyngeal erythema or uvula swelling.      Tonsils: No tonsillar exudate or tonsillar abscesses.   Eyes:      General: Lids are normal. Gaze aligned appropriately. No scleral icterus.        Right eye: No foreign body, discharge or hordeolum.         Left eye: No foreign body, discharge or hordeolum.      Extraocular Movements: Extraocular movements intact.      Right eye: Normal extraocular motion and no nystagmus.      Left eye: Normal extraocular motion and no nystagmus.      Conjunctiva/sclera: Conjunctivae normal.      Right eye: Right conjunctiva is not injected. No chemosis, exudate or hemorrhage.     Left eye: Left conjunctiva is not injected. No chemosis, exudate or hemorrhage.     Pupils: Pupils are equal, round, and reactive to light.   Neck:      Thyroid: No thyroid mass, thyromegaly or thyroid tenderness.      Vascular: Normal carotid pulses. No carotid bruit, hepatojugular reflux or JVD.      Trachea: Trachea and phonation normal. No tracheal tenderness, tracheostomy, abnormal tracheal secretions or tracheal deviation.   Cardiovascular:      Rate and Rhythm: Normal rate and regular rhythm.      Pulses: Normal pulses.           Carotid pulses are 2+ on the right side and 2+ on the left side.       Radial pulses are 2+ on the right side and 2+ on the left side.        Dorsalis pedis pulses are 2+ on the right side and 2+ on the left side.        Posterior tibial pulses are 2+ on the right side and 2+ on the left side.      Heart sounds: Normal heart sounds, S1 normal and S2 normal.   Pulmonary:      Effort: Pulmonary effort is normal. No tachypnea,  bradypnea, accessory muscle usage, prolonged expiration, respiratory distress or retractions. She is not intubated.      Breath sounds: Normal breath sounds and air entry. No stridor, decreased air movement or transmitted upper airway sounds. No decreased breath sounds, wheezing, rhonchi or rales.   Chest:      Chest wall: No tenderness.      Comments: Breast exam deferred.  To be done by GYN per patient.  Abdominal:      General: Bowel sounds are normal. There is no distension.      Palpations: Abdomen is soft. Abdomen is not rigid. There is no fluid wave, hepatomegaly, splenomegaly or mass.      Tenderness: There is no abdominal tenderness. There is no right CVA tenderness, left CVA tenderness, guarding or rebound.   Musculoskeletal:      Cervical back: Neck supple. No tenderness.      Right lower leg: No edema.      Left lower leg: No edema.   Lymphadenopathy:      Head:      Right side of head: No submental, submandibular, preauricular, posterior auricular or occipital adenopathy.      Left side of head: No submental, submandibular, preauricular, posterior auricular or occipital adenopathy.      Cervical: No cervical adenopathy.      Right cervical: No superficial, deep or posterior cervical adenopathy.     Left cervical: No superficial, deep or posterior cervical adenopathy.      Upper Body:      Right upper body: No supraclavicular adenopathy.      Left upper body: No supraclavicular adenopathy.   Skin:     General: Skin is warm and dry.      Coloration: Skin is not pale.      Findings: No erythema or rash.   Neurological:      Mental Status: She is alert and oriented to person, place, and time.   Psychiatric:         Mood and Affect: Mood normal.         Speech: Speech normal.         Behavior: Behavior normal. Behavior is cooperative.              ASSESSMENT/PLAN:     Encounter Diagnoses   Name Primary?    Adult general medical exam Check urine.    Yes    Encounter for screening mammogram for malignant  neoplasm of breast Check mammogram after 8-825. Continue self breast exam every month.         Carpal tunnel syndrome of right wrist       Chronic low back pain with bilateral sciatica, unspecified back pain laterality       Chronic kidney disease (CKD) stage G2/A2, mildly decreased glomerular filtration rate (GFR) between 60-89 mL/min/1.73 square meter and albuminuria creatinine ratio between  mg/g No motrin, ibuprofen, advil, alleve, naprosyn  with these medications.  Hydrate at least 48 ounces of water a day.       Colon cancer screening up-to-date with screening.       Constipation, unspecified constipation type bowel regimen.  Benefiber 1 teaspoon a day.  If no bowel movement after 2 to 3 days we will add MiraLAX as needed.       Dry eyes stable with eyedrops.       Elevated liver enzymes     Fatty liver disease, nonalcoholic weight loss.  Low-fat diet.       History of renal cell carcinoma stable.       Primary hypertension Stable. Careful with diet and excercise at least 30 minutes 3-4 times a week. Check blood pressures at different times on different days. Can purchase own blood pressure monitor. If not, check at local pharmacy. Bake foods more and grill occasionally. Avoid fried foods. No salt. Use other seasonings.         Acquired hypothyroidism Stable.        Migraine without aura and without status migrainosus, not intractable Stable.        Jeffery neuroma, right Stable.        Multinodular goiter Stable.        Other hydronephrosis Stable.        Pain of left hip joint stable.  But still with pain that disrupts sleep.  Gabapentin 300 at night helps.  But still not more than 50%.  Will change to Lyrica because also with sciatica right side.  Discussed about side effects and use.  Will add Lyrica at 100 at night for 2 weeks and if can can increase to 100 twice a day.       Pancreatic cyst (HCC) scheduled for an MRI to check for kidneys and for pancreatic cyst.       PCOS (polycystic ovarian  syndrome)        Ptosis of both eyelids Stable.        Status post nephrectomy       Thoracic spinal stenosis Stable.        Vaccine counseling Up to date.        Vitamin D deficiency Stable.        Type 2 diabetes mellitus without complication, without long-term current use of insulin (HCC) Higher.  Seeing endocrine which just recently were increased Mounjaro and also insulin.  Has a follow-up with endocrinology.  Check urine and blood.Careful with diet and excercise at least 30 minutes 3-4 times a week. Check sugars at different times on different dates. Careful with low sugars. Carry something with you and check sugar if can. Can carry aristides cracker, etc. Decrease carbohydrates. But also, careful with fruits and natural sugars.One serving a day and no more than 1 handful every day. Check feet  every AM and careful with sores and ulcers on feet bilaterally. Check eyes every year with dilated eye exam.  Check sugars.  2-hour postmeal should be less than 140s.  Pre-meal should be 's.  Both equally affected A1c.  Discussed importance of glycemic control to prevent complications of diabetes  -Discussed complications of diabetes include retinopathy, neuropathy, nephropathy and cardiovascular disease  -Discussed ABCs of DM  -Discussed importance of SBGM  -Discussed importance of low CHO diet, recommend 45gm per meal or 135gm per day  -Normal foot exam.Wear shoes or sandals in the house at all times.  Good comfortable shoes or sandals that have a strap so stay on the feet.  Check feet every morning watch for signs and symptoms of infection i.e. sores drainage redness pain etc.  If lotion feet, lotion on the top and bottom and not between the toes.   -UTD with optho  -Normotensive         Orders Placed This Encounter   Procedures    URINALYSIS, AUTO, W/O SCOPE       Meds This Visit:  Requested Prescriptions     Signed Prescriptions Disp Refills    pregabalin (LYRICA) 100 MG Oral Cap 180 capsule 2     Sig: Take 1  capsule (100 mg total) by mouth 2 (two) times daily.       Imaging & Referrals:  [facility] EUGENIA 2D+3D SCREENING BILAT (CPT=77067/42941)      RTC 3 months for FU lyrica and DM.

## 2025-05-09 NOTE — PATIENT INSTRUCTIONS
ASSESSMENT/PLAN:     Encounter Diagnoses   Name Primary?    Adult general medical exam Check urine.    Yes    Encounter for screening mammogram for malignant neoplasm of breast Check mammogram after 8-825. Continue self breast exam every month.         Carpal tunnel syndrome of right wrist       Chronic low back pain with bilateral sciatica, unspecified back pain laterality       Chronic kidney disease (CKD) stage G2/A2, mildly decreased glomerular filtration rate (GFR) between 60-89 mL/min/1.73 square meter and albuminuria creatinine ratio between  mg/g No motrin, ibuprofen, advil, alleve, naprosyn  with these medications.  Hydrate at least 48 ounces of water a day.       Colon cancer screening up-to-date with screening.       Constipation, unspecified constipation type bowel regimen.  Benefiber 1 teaspoon a day.  If no bowel movement after 2 to 3 days we will add MiraLAX as needed.       Dry eyes stable with eyedrops.       Elevated liver enzymes     Fatty liver disease, nonalcoholic weight loss.  Low-fat diet.       History of renal cell carcinoma stable.       Primary hypertension Stable. Careful with diet and excercise at least 30 minutes 3-4 times a week. Check blood pressures at different times on different days. Can purchase own blood pressure monitor. If not, check at local pharmacy. Bake foods more and grill occasionally. Avoid fried foods. No salt. Use other seasonings.         Acquired hypothyroidism Stable.        Migraine without aura and without status migrainosus, not intractable Stable.        Jeffery neuroma, right Stable.        Multinodular goiter Stable.        Other hydronephrosis Stable.        Pain of left hip joint stable.  But still with pain that disrupts sleep.  Gabapentin 300 at night helps.  But still not more than 50%.  Will change to Lyrica because also with sciatica right side.  Discussed about side effects and use.  Will add Lyrica at 100 at night for 2 weeks and if can can  increase to 100 twice a day.       Pancreatic cyst (HCC) scheduled for an MRI to check for kidneys and for pancreatic cyst.       PCOS (polycystic ovarian syndrome)        Ptosis of both eyelids Stable.        Status post nephrectomy       Thoracic spinal stenosis Stable.        Vaccine counseling Up to date.        Vitamin D deficiency Stable.        Type 2 diabetes mellitus without complication, without long-term current use of insulin (HCC) Higher.  Seeing endocrine which just recently were increased Mounjaro and also insulin.  Has a follow-up with endocrinology.  Check urine and blood.Careful with diet and excercise at least 30 minutes 3-4 times a week. Check sugars at different times on different dates. Careful with low sugars. Carry something with you and check sugar if can. Can carry aristides cracker, etc. Decrease carbohydrates. But also, careful with fruits and natural sugars.One serving a day and no more than 1 handful every day. Check feet  every AM and careful with sores and ulcers on feet bilaterally. Check eyes every year with dilated eye exam.  Check sugars.  2-hour postmeal should be less than 140s.  Pre-meal should be 's.  Both equally affected A1c.  Discussed importance of glycemic control to prevent complications of diabetes  -Discussed complications of diabetes include retinopathy, neuropathy, nephropathy and cardiovascular disease  -Discussed ABCs of DM  -Discussed importance of SBGM  -Discussed importance of low CHO diet, recommend 45gm per meal or 135gm per day  -Normal foot exam.Wear shoes or sandals in the house at all times.  Good comfortable shoes or sandals that have a strap so stay on the feet.  Check feet every morning watch for signs and symptoms of infection i.e. sores drainage redness pain etc.  If lotion feet, lotion on the top and bottom and not between the toes.   -UTD with optho  -Normotensive         Orders Placed This Encounter   Procedures    URINALYSIS, AUTO, W/O SCOPE        Meds This Visit:  Requested Prescriptions     Signed Prescriptions Disp Refills    pregabalin (LYRICA) 100 MG Oral Cap 180 capsule 2     Sig: Take 1 capsule (100 mg total) by mouth 2 (two) times daily.       Imaging & Referrals:  [facility] EUGENIA 2D+3D SCREENING BILAT (CPT=77067/22166)

## 2025-05-10 ENCOUNTER — LAB ENCOUNTER (OUTPATIENT)
Dept: LAB | Facility: HOSPITAL | Age: 55
End: 2025-05-10
Attending: INTERNAL MEDICINE
Payer: COMMERCIAL

## 2025-05-10 DIAGNOSIS — E11.9 TYPE 2 DIABETES MELLITUS WITHOUT COMPLICATION, WITHOUT LONG-TERM CURRENT USE OF INSULIN (HCC): ICD-10-CM

## 2025-05-10 LAB
ALBUMIN SERPL-MCNC: 4 G/DL (ref 3.2–4.8)
ALBUMIN/GLOB SERPL: 1.5 {RATIO} (ref 1–2)
ALP LIVER SERPL-CCNC: 90 U/L (ref 46–118)
ALT SERPL-CCNC: 47 U/L (ref 10–49)
ANION GAP SERPL CALC-SCNC: 6 MMOL/L (ref 0–18)
AST SERPL-CCNC: 38 U/L (ref ?–34)
BILIRUB SERPL-MCNC: 0.5 MG/DL (ref 0.3–1.2)
BUN BLD-MCNC: 13 MG/DL (ref 9–23)
BUN/CREAT SERPL: 12.3 (ref 10–20)
CALCIUM BLD-MCNC: 9.3 MG/DL (ref 8.7–10.4)
CHLORIDE SERPL-SCNC: 104 MMOL/L (ref 98–112)
CHOLEST SERPL-MCNC: 110 MG/DL (ref ?–200)
CO2 SERPL-SCNC: 28 MMOL/L (ref 21–32)
CREAT BLD-MCNC: 1.06 MG/DL (ref 0.55–1.02)
EGFRCR SERPLBLD CKD-EPI 2021: 62 ML/MIN/1.73M2 (ref 60–?)
FASTING PATIENT LIPID ANSWER: YES
FASTING STATUS PATIENT QL REPORTED: YES
GLOBULIN PLAS-MCNC: 2.7 G/DL (ref 2–3.5)
GLUCOSE BLD-MCNC: 163 MG/DL (ref 70–99)
HDLC SERPL-MCNC: 45 MG/DL (ref 40–59)
LDLC SERPL CALC-MCNC: 49 MG/DL (ref ?–100)
NONHDLC SERPL-MCNC: 65 MG/DL (ref ?–130)
OSMOLALITY SERPL CALC.SUM OF ELEC: 290 MOSM/KG (ref 275–295)
POTASSIUM SERPL-SCNC: 4.2 MMOL/L (ref 3.5–5.1)
PROT SERPL-MCNC: 6.7 G/DL (ref 5.7–8.2)
SODIUM SERPL-SCNC: 138 MMOL/L (ref 136–145)
TRIGL SERPL-MCNC: 77 MG/DL (ref 30–149)
VLDLC SERPL CALC-MCNC: 11 MG/DL (ref 0–30)

## 2025-05-10 PROCEDURE — 80061 LIPID PANEL: CPT

## 2025-05-10 PROCEDURE — 36415 COLL VENOUS BLD VENIPUNCTURE: CPT

## 2025-05-10 PROCEDURE — 80053 COMPREHEN METABOLIC PANEL: CPT

## 2025-05-10 NOTE — PROGRESS NOTES
CMP Normal (electrolytes, and liver functions), liver enzyme is elevated but much better than prior.  Slight decline in kidney function from prior.  Hydrate at least 48 ounces of water a day.No motrin, ibuprofen, advil, alleve, naprosyn  with these medications.  Recheck blood work for kidney function nonfasting labs with at least 8 ounces of water prior to retesting in 2 weeks or more.  Orders written.  Lipid (choilesterol) is good,

## 2025-05-12 ENCOUNTER — PATIENT MESSAGE (OUTPATIENT)
Dept: INTERNAL MEDICINE CLINIC | Facility: CLINIC | Age: 55
End: 2025-05-12

## 2025-05-12 NOTE — TELEPHONE ENCOUNTER
Dr KATHRINE Maradiaga,    See patients' mychart message and clarify her question. Thank you.    Please reply to pool: EM RN TRIAGE

## 2025-05-13 NOTE — TELEPHONE ENCOUNTER
Please review last office visit note from 5/8/2025.,  Stating we are replacing the gabapentin with Lyrica.  I do not know who to call to clarify this?

## 2025-05-13 NOTE — TELEPHONE ENCOUNTER
Called [facility] to let them know we are replacing the gabapentin with lyrica, they verbalized understanding.

## 2025-06-25 RX ORDER — AMLODIPINE BESYLATE 2.5 MG/1
2.5 TABLET ORAL 2 TIMES DAILY
Qty: 180 TABLET | Refills: 3 | Status: SHIPPED | OUTPATIENT
Start: 2025-06-25

## 2025-06-25 RX ORDER — ATENOLOL 25 MG/1
25 TABLET ORAL NIGHTLY
Qty: 90 TABLET | Refills: 3 | Status: SHIPPED | OUTPATIENT
Start: 2025-06-25

## 2025-06-25 NOTE — TELEPHONE ENCOUNTER
Refill Per Protocol     Requested Prescriptions   Pending Prescriptions Disp Refills    ATENOLOL 25 MG Oral Tab [Pharmacy Med Name: Atenolol 25 MG Oral Tablet] 90 tablet 3     Sig: TAKE 1 TABLET BY MOUTH NIGHTLY       Hypertension Medications Protocol Passed - 6/25/2025 11:03 AM        Passed - CMP or BMP in past 12 months        Passed - Last BP reading less than 140/90     BP Readings from Last 1 Encounters:   05/08/25 130/78               Passed - In person appointment or virtual visit in the past 12 mos or appointment in next 3 mos     Recent Outpatient Visits              1 month ago [place] general medical exam    [facility] [place]    Office Visit    4 months ago Elevated liver enzymes    [facility] [place]    Office Visit    4 months ago Lumbar spondylosis    [facility] [place]    Office Visit    5 months ago Psoriasis vulgaris    [facility] [place]    Office Visit    7 months ago Lumbosacral spondylosis with radiculopathy    [facility] [place]    Office Visit          Future Appointments         Provider Department Appt Notes    In 2 weeks WDR MRI [facility] (1.5T WIDE) [facility]     In 1 month [facility]    In 4 months Farzana Daugherty MD [facility] diabetes and medications                    Passed - EGFRCR or GFRNAA > 50     GFR Evaluation  EGFRCR: 62 , resulted on 5/10/2025          Passed - Medication is active on med list

## 2025-06-25 NOTE — TELEPHONE ENCOUNTER
Refill Per Protocol     Requested Prescriptions   Pending Prescriptions Disp Refills    AMLODIPINE 2.5 MG Oral Tab [Pharmacy Med Name: amLODIPine Besylate 2.5 MG Oral Tablet] 180 tablet 3     Sig: TAKE 1 TABLET TWICE A DAY       Hypertension Medications Protocol Passed - 6/25/2025 11:08 AM        Passed - CMP or BMP in past 12 months        Passed - Last BP reading less than 140/90     BP Readings from Last 1 Encounters:   05/08/25 130/78               Passed - In person appointment or virtual visit in the past 12 mos or appointment in next 3 mos     Recent Outpatient Visits              1 month ago Adult general medical exam    [facility] [place]    Office Visit    4 months ago Elevated liver enzymes    [facility] [place] [facility]    Office Visit    4 months ago Lumbar spondylosis    [facility] [place]    Office Visit    5 months ago Psoriasis vulgaris    [facility] [place]    Office Visit    7 months ago Lumbosacral spondylosis with radiculopathy    [facility] [place]    Office Visit          Future Appointments         Provider Department Appt Notes    In 2 weeks WDR MRI [facility] (1.5T WIDE) [facility]     In 1 month Leonila Maradiaga MD [facility] 3 month fu    In 4 months Farznaa Daugherty MD [facility] diabetes and medications                    Passed - EGFRCR or GFRNAA > 50     GFR Evaluation  EGFRCR: 62 , resulted on 5/10/2025          Passed - Medication is active on med list

## 2025-07-15 ENCOUNTER — HOSPITAL ENCOUNTER (OUTPATIENT)
Dept: MRI IMAGING | Age: 55
Discharge: HOME | End: 2025-07-15
Attending: UROLOGY
Payer: COMMERCIAL

## 2025-07-15 DIAGNOSIS — C64.1 RENAL CELL CARCINOMA OF RIGHT KIDNEY (HCC): ICD-10-CM

## 2025-07-15 PROCEDURE — A9575 INJ GADOTERATE MEGLUMI 0.1ML: HCPCS

## 2025-07-15 PROCEDURE — 74183 MRI ABD W/O CNTR FLWD CNTR: CPT | Performed by: UROLOGY

## 2025-07-15 RX ORDER — GADOTERATE MEGLUMINE 376.9 MG/ML
20 INJECTION INTRAVENOUS
Status: COMPLETED | OUTPATIENT
Start: 2025-07-15 | End: 2025-07-15

## 2025-07-15 RX ADMIN — GADOTERATE MEGLUMINE 20 ML: 376.9 INJECTION INTRAVENOUS at 16:22:00

## 2025-08-14 ENCOUNTER — OFFICE VISIT (OUTPATIENT)
Dept: INTERNAL MEDICINE CLINIC | Facility: CLINIC | Age: 55
End: 2025-08-14

## 2025-08-14 VITALS
RESPIRATION RATE: 19 BRPM | WEIGHT: 263 LBS | OXYGEN SATURATION: 98 % | DIASTOLIC BLOOD PRESSURE: 71 MMHG | BODY MASS INDEX: 42.27 KG/M2 | SYSTOLIC BLOOD PRESSURE: 107 MMHG | HEIGHT: 66 IN | HEART RATE: 80 BPM | TEMPERATURE: 97 F

## 2025-08-14 DIAGNOSIS — E55.9 VITAMIN D DEFICIENCY: Primary | ICD-10-CM

## 2025-08-14 DIAGNOSIS — R74.8 ELEVATED LIVER ENZYMES: ICD-10-CM

## 2025-08-14 DIAGNOSIS — N17.9 AKI (ACUTE KIDNEY INJURY): ICD-10-CM

## 2025-08-14 DIAGNOSIS — E11.9 TYPE 2 DIABETES MELLITUS WITHOUT COMPLICATION, WITHOUT LONG-TERM CURRENT USE OF INSULIN (HCC): ICD-10-CM

## 2025-08-14 LAB
ANION GAP SERPL CALC-SCNC: 9 MMOL/L (ref 0–18)
BUN BLD-MCNC: 10 MG/DL (ref 9–23)
BUN/CREAT SERPL: 8.6 (ref 10–20)
CALCIUM BLD-MCNC: 9.6 MG/DL (ref 8.7–10.4)
CHLORIDE SERPL-SCNC: 102 MMOL/L (ref 98–112)
CO2 SERPL-SCNC: 26 MMOL/L (ref 21–32)
CREAT BLD-MCNC: 1.16 MG/DL (ref 0.55–1.02)
EGFRCR SERPLBLD CKD-EPI 2021: 55 ML/MIN/1.73M2 (ref 60–?)
FASTING STATUS PATIENT QL REPORTED: NO
GLUCOSE BLD-MCNC: 168 MG/DL (ref 70–99)
OSMOLALITY SERPL CALC.SUM OF ELEC: 287 MOSM/KG (ref 275–295)
POTASSIUM SERPL-SCNC: 4.4 MMOL/L (ref 3.5–5.1)
SODIUM SERPL-SCNC: 137 MMOL/L (ref 136–145)

## 2025-08-14 PROCEDURE — 3008F BODY MASS INDEX DOCD: CPT | Performed by: INTERNAL MEDICINE

## 2025-08-14 PROCEDURE — 3074F SYST BP LT 130 MM HG: CPT | Performed by: INTERNAL MEDICINE

## 2025-08-14 PROCEDURE — 3078F DIAST BP <80 MM HG: CPT | Performed by: INTERNAL MEDICINE

## 2025-08-14 PROCEDURE — 99215 OFFICE O/P EST HI 40 MIN: CPT | Performed by: INTERNAL MEDICINE

## 2025-08-14 PROCEDURE — 36415 COLL VENOUS BLD VENIPUNCTURE: CPT | Performed by: INTERNAL MEDICINE

## 2025-08-14 PROCEDURE — 3051F HG A1C>EQUAL 7.0%<8.0%: CPT | Performed by: INTERNAL MEDICINE

## 2025-08-14 RX ORDER — TIRZEPATIDE 15 MG/.5ML
INJECTION, SOLUTION SUBCUTANEOUS
COMMUNITY
Start: 2025-08-07

## 2025-08-14 RX ORDER — ROSUVASTATIN CALCIUM 5 MG/1
5 TABLET, COATED ORAL NIGHTLY
Qty: 90 TABLET | Refills: 3 | Status: SHIPPED | OUTPATIENT
Start: 2025-08-14

## 2025-08-14 RX ORDER — DULOXETINE 40 MG/1
40 CAPSULE, DELAYED RELEASE ORAL EVERY EVENING
Qty: 30 CAPSULE | Refills: 2 | Status: SHIPPED | OUTPATIENT
Start: 2025-08-14 | End: 2025-08-14

## (undated) DEVICE — TRAY FOLEY BDX 16F STATLOCK

## (undated) DEVICE — SUTURE VICRYL 4-0 J218H

## (undated) DEVICE — CATH URET CONE TIP 8FR 138008

## (undated) DEVICE — ENDOSCOPIC VALVE WITH ADAPTER.: Brand: SURSEAL® II

## (undated) DEVICE — SUTURE PDS II 0 CTX

## (undated) DEVICE — STERILE SURGICAL LUBRICANT, METAL TUBE: Brand: SURGILUBE

## (undated) DEVICE — CYSTO PACK: Brand: MEDLINE INDUSTRIES, INC.

## (undated) DEVICE — PLEDGETT SOFT 1/4 X 12

## (undated) DEVICE — DRAIN RESERVOIR RELIAVAC 100CC

## (undated) DEVICE — STERILE LATEX POWDER-FREE SURGICAL GLOVESWITH NITRILE COATING: Brand: PROTEXIS

## (undated) DEVICE — TROCAR: Brand: KII® SLEEVE

## (undated) DEVICE — LAP CHOLE: Brand: MEDLINE INDUSTRIES, INC.

## (undated) DEVICE — SOL  .9 3000ML

## (undated) DEVICE — 3M™ TEGADERM™ TRANSPARENT FILM DRESSING, 1626W, 4 IN X 4-3/4 IN (10 CM X 12 CM), 50 EACH/CARTON, 4 CARTON/CASE: Brand: 3M™ TEGADERM™

## (undated) DEVICE — SPONGE LAP 18X18 XRAY STRL

## (undated) DEVICE — ISOVUE 300 10X100ML VIAL

## (undated) DEVICE — SOL  .9 1000ML BTL

## (undated) DEVICE — POOLE SUCTION INSTRUMENT WITH REMOVABLE SHEATH: Brand: POOLE

## (undated) DEVICE — ZIPWIRE GUIDEWIRE .038X150 ANG

## (undated) DEVICE — ZIPWIRE HYDROPHILIC GUIDEWIRE

## (undated) DEVICE — TIGERTAIL 5F FLXTIP 70CM

## (undated) DEVICE — SOL H2O 1000ML BTL

## (undated) DEVICE — 3 ML SYRINGE LUER-LOCK TIP: Brand: MONOJECT

## (undated) DEVICE — URETERAL ACCESS SHEATH SET: Brand: NAVIGATOR HD

## (undated) DEVICE — RADIFOCUS GLIDECATH: Brand: GLIDECATH

## (undated) DEVICE — CLIP HEMOLOK MEDIUM GREEN

## (undated) DEVICE — ENCORE® LATEX ACCLAIM SIZE 8.5, STERILE LATEX POWDER-FREE SURGICAL GLOVE: Brand: ENCORE

## (undated) DEVICE — VIOLET BRAIDED (POLYGLACTIN 910), SYNTHETIC ABSORBABLE SUTURE: Brand: COATED VICRYL

## (undated) DEVICE — SOL H2O 3000ML IRRIG

## (undated) DEVICE — UROLOGY DRAIN BAG

## (undated) DEVICE — BLADE ELECTRODE: Brand: EDGE

## (undated) DEVICE — BALLOON DILATATION CATHETER KIT: Brand: UROMAX ULTRA KIT

## (undated) DEVICE — MEDI-VAC NON-CONDUCTIVE SUCTION TUBING: Brand: CARDINAL HEALTH

## (undated) DEVICE — SUTURE VICRYL 0 UR-6

## (undated) DEVICE — GAUZE SPONGES,12 PLY: Brand: CURITY

## (undated) DEVICE — SUTURE VICRYL 0 J906G

## (undated) DEVICE — [HIGH FLOW INSUFFLATOR,  DO NOT USE IF PACKAGE IS DAMAGED,  KEEP DRY,  KEEP AWAY FROM SUNLIGHT,  PROTECT FROM HEAT AND RADIOACTIVE SOURCES.]: Brand: PNEUMOSURE

## (undated) DEVICE — AMPLATZ ULTRA STIFF FIXED CORE WIRE GUIDE: Brand: AMPLATZ

## (undated) DEVICE — INTENDED TO BE USED TO OCCLUDE, RETRACT AND IDENTIFY ARTERIES, VEINS, TENDONS AND NERVES IN SURGICAL PROCEDURES: Brand: STERION®  VESSEL LOOP

## (undated) DEVICE — CAUTERY: TIP CLEANER XR 100/CS: Brand: MEDICAL ACTION INDUSTRIES

## (undated) DEVICE — ABSORBABLE HEMOSTAT (OXIDIZED REGENERATED CELLULOSE, U.S.P.): Brand: SURGICEL

## (undated) DEVICE — TROCAR: Brand: KII FIOS FIRST ENTRY

## (undated) DEVICE — ACCESS PLATFORM FOR MINIMALLY INVASIVE SURGERY.: Brand: GELPORT® LAPAROSCOPIC  SYSTEM

## (undated) DEVICE — Device

## (undated) DEVICE — INDICATED FOR SURGICAL CLAMPING DURING CARDIOVASCULAR PERIPHERAL VASCULAR, AND GENERAL SURGERY.: Brand: SOFT/FIBRA® SPRING CLIP

## (undated) DEVICE — 3M™ IOBAN™ 2 ANTIMICROBIAL INCISE DRAPE 6640EZ: Brand: IOBAN™ 2

## (undated) DEVICE — NITINOL WIRE ANGLED 038

## (undated) DEVICE — FLEXOR, URETERAL ACCESS SHEATH WITH AQ, HYDROPHILIC COATING: Brand: FLEXOR

## (undated) DEVICE — SUTURE ETHILON 3-0 669H

## (undated) DEVICE — STENT URET 6F 26CM WO GW INL
Type: IMPLANTABLE DEVICE | Site: URETER | Status: NON-FUNCTIONAL
Removed: 2019-01-10

## (undated) DEVICE — APPLCTR ENDO FLOSEAL DISP

## (undated) DEVICE — USE ITEM #176901

## (undated) DEVICE — MATTRESS PT HOVERMATT 1/2L 34W

## (undated) DEVICE — VIOLET POLYDIOXANONE POLYMER, SYNTHETIC ABSORBABLE SUTURE CLIPS: Brand: LAPRA-TY

## (undated) DEVICE — FLOSEAL HEMOSTATIC MATRIX, 5ML: Brand: FLOSEAL HEMOSTATIC MATRIX

## (undated) DEVICE — GUIDE WRE .035IN 150CM STRG

## (undated) DEVICE — NITINOL WIRE ANGLED 035

## (undated) DEVICE — 12 ML SYRINGE LUER-LOCK TIP: Brand: MONOJECT

## (undated) DEVICE — SUTURE VICRYL 2-0 J105T

## (undated) DEVICE — UNDYED BRAIDED (POLYGLACTIN 910), SYNTHETIC ABSORBABLE SUTURE: Brand: COATED VICRYL

## (undated) DEVICE — CONTAINER SPEC STR 4OZ GRY LID

## (undated) DEVICE — 3M™ STERI-STRIP™ REINFORCED ADHESIVE SKIN CLOSURES, R1547, 1/2 IN X 4 IN (12 MM X 100 MM), 6 STRIPS/ENVELOPE: Brand: 3M™ STERI-STRIP™

## (undated) DEVICE — DRAIN SILICONE FLAT 10MM

## (undated) DEVICE — SUTURE VICRYL 2-0 CT-1

## (undated) DEVICE — GUIDEWIRE GLIDEWIRE L150 .035

## (undated) DEVICE — NEEDLE HPO 18GA 1.5IN ECLPS

## (undated) DEVICE — SUTURE VICRYL 0 J340H

## (undated) DEVICE — SPONGE: SPECIALTY PEANUT XR 100/CS: Brand: MEDICAL ACTION INDUSTRIES

## (undated) DEVICE — SUTURE VICRYL 2-0 SH

## (undated) NOTE — LETTER
[address] [address]   Date:   1/25/2021     Name:   Carlos Alberto Singh    YOB: 1970   MRN:   [id number]       WHERE IS YOUR PAIN NOW?   Edd the areas on your body where you feel the described sensation

## (undated) NOTE — LETTER
9/24/2019          To Whom It May Concern:    Eleanor Dhaliwal is currently under my medical care. She is on insulin and needs to eat at regular times. If you require additional information please contact our office. Sincerely,    Venus Burciaga.  Ana

## (undated) NOTE — LETTER
[address] [place] [address]  Authorization for Invasive Procedures  1.  I hereby authorize Dr. Araceli Phoenix , my physician and whomever may be designated as the doctor's assistant, to perform the following operation and/or procedure:Ultrasound [place] performed for the purposes of advancing medicine, science, and/or education, provided my identity is not revealed. If the procedure has been videotaped, the physician/surgeon will obtain the original videotape.  The hospital will not be responsible for stor My signature below affirms that prior to the time of the procedure, I have explained to the patient and/or her legal representative, the risks and benefits involved in the proposed treatment and any reasonable alternative to the proposed treatment.  I have

## (undated) NOTE — LETTER
12/3/2018              [address] [place] [address]  [id number]         Dear Efren Wharton records indicate that the lab tests ordered for you by Claritza Jaime MD  have not been done.   If you have, in fact, already complete

## (undated) NOTE — MR AVS SNAPSHOT
[address] [facility] [address]  [phone]               Thank you for choosing us for your health care visit with Jodi Schroeder.  Kurtis King MD.  We are glad to serve you and happy to provide you with this summary of your v ulcers on feet bilaterally. Check eyes every year with dilated eye exam.     Acquired hypothyroidism l                                                                                  Check records of pap. Take 1 tablet (25 mg total) by mouth every evening. 1 PO QHS. Commonly known as:  TENORMIN           Levothyroxine Sodium 100 MCG Tabs   TAKE 1 TABLET BEFORE BREAKFAST   What changed:  See the new instructions.    Commonly known as:  SYNTHROID           L Return in about 6 months (around 8/13/2017), or if symptoms worsen or fail to improve.          Results of Recent Testing       MyChart     Visit MyChart  You can access your MyChart to more actively manage your health care and view more details from this 2 ½ hours per week – spread out over time Use a tahir to keep you motivated   Don’t forget strength training with weights and resistance Set goals and track your progress   You don’t need to join a gym. Home exercises work great.  Put more priority on exe

## (undated) NOTE — Clinical Note
Dear [facility],    I had the opportunity to see your patient Domenico Carlton recently. I am sending you this update, and I appreciate your confidence in me to care for your patients.  Please feel free call me with any questions at [phone] or contact me th

## (undated) NOTE — LETTER
[address]   Date:   3/16/2021     Name:   Caro Robledo    YOB: 1970   MRN:   [id number]       WHERE IS YOUR PAIN NOW? Edd the areas on your body where you feel the described sensations.   Use the appropriat

## (undated) NOTE — LETTER
[address]   Date:   2/16/2021     Name:   Sahara Garcia    YOB: 1970   MRN:   [id number]       WHERE IS YOUR PAIN NOW? Edd the areas on your body where you feel the described sensations.   Use the appropriat